# Patient Record
Sex: MALE | Race: WHITE | NOT HISPANIC OR LATINO | Employment: FULL TIME | URBAN - METROPOLITAN AREA
[De-identification: names, ages, dates, MRNs, and addresses within clinical notes are randomized per-mention and may not be internally consistent; named-entity substitution may affect disease eponyms.]

---

## 2017-01-13 DIAGNOSIS — G44.209 MIXED MIGRAINE AND MUSCLE CONTRACTION HEADACHE: ICD-10-CM

## 2017-01-13 DIAGNOSIS — G43.909 MIXED MIGRAINE AND MUSCLE CONTRACTION HEADACHE: ICD-10-CM

## 2017-01-13 DIAGNOSIS — F32.A DEPRESSION: ICD-10-CM

## 2017-01-13 RX ORDER — ALPRAZOLAM 1 MG/1
TABLET ORAL
Qty: 60 TABLET | Refills: 5 | OUTPATIENT
Start: 2017-01-13

## 2017-04-11 DIAGNOSIS — G43.909 MIXED MIGRAINE AND MUSCLE CONTRACTION HEADACHE: ICD-10-CM

## 2017-04-11 DIAGNOSIS — G44.209 MIXED MIGRAINE AND MUSCLE CONTRACTION HEADACHE: ICD-10-CM

## 2017-04-11 DIAGNOSIS — F32.A DEPRESSION: ICD-10-CM

## 2017-04-11 RX ORDER — ALPRAZOLAM 1 MG/1
TABLET ORAL
Qty: 60 TABLET | Refills: 5 | Status: SHIPPED | OUTPATIENT
Start: 2017-04-11 | End: 2017-10-16 | Stop reason: SDUPTHER

## 2017-05-05 ENCOUNTER — OFFICE VISIT (OUTPATIENT)
Dept: NEUROLOGY | Facility: CLINIC | Age: 57
End: 2017-05-05
Payer: COMMERCIAL

## 2017-05-05 VITALS
DIASTOLIC BLOOD PRESSURE: 98 MMHG | BODY MASS INDEX: 27.86 KG/M2 | HEART RATE: 72 BPM | SYSTOLIC BLOOD PRESSURE: 160 MMHG | WEIGHT: 167.19 LBS | HEIGHT: 65 IN

## 2017-05-05 DIAGNOSIS — G43.909 MIXED MIGRAINE AND MUSCLE CONTRACTION HEADACHE: ICD-10-CM

## 2017-05-05 DIAGNOSIS — G43.009 MIGRAINE WITHOUT AURA AND WITHOUT STATUS MIGRAINOSUS, NOT INTRACTABLE: Primary | ICD-10-CM

## 2017-05-05 DIAGNOSIS — G44.209 MIXED MIGRAINE AND MUSCLE CONTRACTION HEADACHE: ICD-10-CM

## 2017-05-05 PROCEDURE — 99999 PR PBB SHADOW E&M-EST. PATIENT-LVL III: CPT | Mod: PBBFAC,,, | Performed by: PSYCHIATRY & NEUROLOGY

## 2017-05-05 PROCEDURE — 3077F SYST BP >= 140 MM HG: CPT | Mod: S$GLB,,, | Performed by: PSYCHIATRY & NEUROLOGY

## 2017-05-05 PROCEDURE — 1160F RVW MEDS BY RX/DR IN RCRD: CPT | Mod: S$GLB,,, | Performed by: PSYCHIATRY & NEUROLOGY

## 2017-05-05 PROCEDURE — 3080F DIAST BP >= 90 MM HG: CPT | Mod: S$GLB,,, | Performed by: PSYCHIATRY & NEUROLOGY

## 2017-05-05 PROCEDURE — 99214 OFFICE O/P EST MOD 30 MIN: CPT | Mod: S$GLB,,, | Performed by: PSYCHIATRY & NEUROLOGY

## 2017-05-05 RX ORDER — TRAZODONE HYDROCHLORIDE 50 MG/1
TABLET ORAL
Status: ON HOLD | COMMUNITY
Start: 2017-02-11 | End: 2018-01-10 | Stop reason: HOSPADM

## 2017-05-05 RX ORDER — ARIPIPRAZOLE 2 MG/1
TABLET ORAL
COMMUNITY
Start: 2017-02-11 | End: 2017-12-01

## 2017-05-05 NOTE — PATIENT INSTRUCTIONS
no medication changes.  Continue Xanax on an as-needed basis.  Continue follow-up with his other physicians.

## 2017-05-05 NOTE — PROGRESS NOTES
Subjective:       Patient ID: Spenser Rabago is a 56 y.o. male.    Chief Complaint:  Headache      History of Present Illness  HPI   This is a 56-year-old male who returns in follow-up.  He was last seen by me 6 months ago.  He has a long history of headaches, mixed in type, coming on with fluctuating intensity. He has been doing well and only takes OTC meds for the headache with an occasional Xanax that he takes at bedtime. He continues working for an [employer] and [employer]. He has a history of hypertension. He continues follow-up with psychiatry and is also being followed by pain management.  Medications were reviewed.  He denies any new medical problems otherwise.       Review of Systems  Review of Systems   Constitutional: Negative.    HENT: Negative for hearing loss.    Eyes: Negative.  Negative for visual disturbance.   Respiratory: Negative.  Negative for shortness of breath.    Cardiovascular: Negative.  Negative for chest pain and palpitations.   Gastrointestinal: Negative.    Genitourinary: Negative.    Musculoskeletal: Negative.  Negative for back pain, gait problem and neck pain.   Skin: Negative.    Neurological: Positive for headaches. Negative for tremors, seizures, syncope, speech difficulty, weakness and numbness.   Psychiatric/Behavioral: Negative for behavioral problems, confusion and hallucinations. The patient is nervous/anxious.        Objective:      Neurologic Exam      Physical Exam   Constitutional: He appears well-developed and well-nourished.   HENT:   Head: Normocephalic and atraumatic.   Right Ear: Hearing normal.   Left Ear: Hearing normal.   Eyes:   Fundus examination shows sharp disc margins.   Neck: Normal range of motion. Neck supple. Carotid bruit is not present.   Neurological: He is alert. He has normal reflexes. He displays no atrophy. No cranial nerve deficit (Visual fields at bedside testing essentially normal.  No facial asymmetry noted with facial movements and  sensory exam being normal/symmetrical.  Corneals/gag reflexes normal.  Tongue & palate movements normal.  Hearing unimpaired.  Shoulder shrug was norm) or sensory deficit. He exhibits normal muscle tone. He displays a negative Romberg sign. Coordination and gait normal.   Mental status examination: Patient is fully oriented and able to give an adequate history.  Recall of recent and past information is good.  Immediate recall is normal.  Attention span and concentration was normal.  Judgment and insight is normal.  Language functions are intact with no evidence of aphasia or dysarthria.  Comprehension is unimpaired.  Affect is appropriate, mood was even.  No thought disorder is noted.   Vitals reviewed.        Assessment:        1. Migraine without aura and without status migrainosus, not intractable    2. Mixed migraine and muscle contraction headache             Plan:       no medication changes.  Continue Xanax on an as-needed basis.  Continue follow-up with his other physicians.  Follow-up in 6 months if stable.

## 2017-05-05 NOTE — MR AVS SNAPSHOT
[facility] - Neurology  [address] [place], [place] [address], [address]  [place] [address]  Phone: [phone]                  Spenser Rabago   2017 3:00 PM   Office Visit    Description:  Male : 1960   Provider:  Frank Lancaster MD   Department:  [facility] - Neurology           Reason for Visit     Headache           Diagnoses this Visit        Comments    Migraine without aura and without status migrainosus, not intractable    -  Primary     Mixed migraine and muscle contraction headache                To Do List           Goals (5 Years of Data)     None      Follow-Up and Disposition     Return in about 6 months (around 2017).      [facility]     [facility] On Call Nurse Care Line -  Assistance  Unless otherwise directed by your provider, please contact [facility], our nurse care line that is available for  assistance.     Registered nurses in the [facility] On Call Center provide: appointment scheduling, clinical advisement, health education, and other advisory services.  Call: [phone] (toll free)               Medications           Message regarding Medications     Verify the changes and/or additions to your medication regime listed below are the same as discussed with your clinician today.  If any of these changes or additions are incorrect, please notify your healthcare provider.        STOP taking these medications     amlodipine (NORVASC) 5 MG tablet Take 1 tablet (5 mg total) by mouth once daily.    lamotrigine (LAMICTAL) 25 MG tablet Take 75 mg by mouth once daily.    OXYCONTIN 20 mg 12 hr tablet Take 20 mg by mouth every 12 (twelve) hours.            Verify that the below list of medications is an accurate representation of the medications you are currently taking.  If none reported, the list may be blank. If incorrect, please contact your healthcare provider. Carry this list with you in case of emergency.           Current Medications     alprazolam (XANAX) 1 MG tablet TAKE  "1 TABLET BY MOUTH TWO TIMES A DAY    aripiprazole (ABILIFY) 2 MG Tab     baclofen (LIORESAL) 10 MG tablet Take 10 mg by mouth 3 (three) times daily.     buPROPion (WELLBUTRIN XL) 150 MG TB24 tablet     dextroamphetamine-amphetamine (ADDERALL) 20 mg tablet Take 1 tablet by mouth 3 (three) times daily.     lisinopril 10 MG tablet Take 10 mg by mouth once daily.    oxycodone-acetaminophen (PERCOCET)  mg per tablet     oxyMORphone (OPANA ER) 10 MG 12 hr tablet     tizanidine (ZANAFLEX) 4 MG tablet Take 4 mg by mouth every 8 (eight) hours.    topiramate (TOPAMAX) 100 MG tablet 100 mg every evening.     trazodone (DESYREL) 50 MG tablet     metoprolol succinate (TOPROL-XL) 50 MG 24 hr tablet Take 1 tablet (50 mg total) by mouth once daily.           Clinical Reference Information           Your Vitals Were     BP Pulse Height Weight BMI    160/98 72 5' 4.5" (1.638 m) 75.8 kg (167 lb 3.2 oz) 28.26 kg/m2      Blood Pressure          Most Recent Value    BP  (!)  160/98      Allergies as of 5/5/2017     Penicillins      Immunizations Administered on Date of Encounter - 5/5/2017     None      [facility] Sign-Up     Activating your [facility] account is as easy as 1-2-3!     1) Visit [email or web address], select Sign Up Now, enter this activation code and your date of birth, then select Next.  [email or web address]  Expires: 6/19/2017  3:03 PM      2) Create a username and password to use when you visit [facility] in the future and select a security question in case you lose your password and select Next.    3) Enter your e-mail address and click Sign Up!    Additional Information  If you have questions, please e-mail [email or web address] or call [phone] to talk to our [facility] staff. Remember, [facility] is NOT to be used for urgent needs. For medical emergencies, dial 911.         Instructions    no medication changes.  Continue Xanax on an as-needed basis.  Continue follow-up with his other physicians.         Language " Assistance Services     ATTENTION: Language assistance services are available, free of charge. Please call [phone].      ATENCIÓN: Si satishla anamika, tiene a ho disposición servicios gratuitos de asistencia lingüística. Llame al [phone].     CHÚ Ý: N?u b?n nói Ti?ng Vi?t, có các d?ch v? h? tr? ngôn ng? mi?n phí dành cho b?n. G?i s? [phone].         [facility] complies with applicable Federal civil rights laws and does not discriminate on the basis of race, color, national origin, age, disability, or sex.

## 2017-09-29 DIAGNOSIS — Z12.11 COLON CANCER SCREENING: ICD-10-CM

## 2017-10-16 DIAGNOSIS — G44.209 MIXED MIGRAINE AND MUSCLE CONTRACTION HEADACHE: ICD-10-CM

## 2017-10-16 DIAGNOSIS — G43.909 MIXED MIGRAINE AND MUSCLE CONTRACTION HEADACHE: ICD-10-CM

## 2017-10-16 DIAGNOSIS — F32.A DEPRESSION: ICD-10-CM

## 2017-10-16 RX ORDER — ALPRAZOLAM 1 MG/1
TABLET ORAL
Qty: 60 TABLET | Refills: 5 | Status: SHIPPED | OUTPATIENT
Start: 2017-10-16 | End: 2017-12-22 | Stop reason: SDUPTHER

## 2017-12-01 ENCOUNTER — OFFICE VISIT (OUTPATIENT)
Dept: INTERNAL MEDICINE | Facility: CLINIC | Age: 57
End: 2017-12-01
Payer: COMMERCIAL

## 2017-12-01 VITALS
HEIGHT: 65 IN | BODY MASS INDEX: 29 KG/M2 | DIASTOLIC BLOOD PRESSURE: 72 MMHG | RESPIRATION RATE: 16 BRPM | HEART RATE: 88 BPM | SYSTOLIC BLOOD PRESSURE: 108 MMHG | WEIGHT: 174.06 LBS | TEMPERATURE: 100 F

## 2017-12-01 DIAGNOSIS — I10 ESSENTIAL HYPERTENSION: ICD-10-CM

## 2017-12-01 DIAGNOSIS — Z00.00 ROUTINE GENERAL MEDICAL EXAMINATION AT A HEALTH CARE FACILITY: ICD-10-CM

## 2017-12-01 DIAGNOSIS — G81.91 RIGHT HEMIPARESIS: Primary | ICD-10-CM

## 2017-12-01 DIAGNOSIS — G43.009 MIGRAINE WITHOUT AURA AND WITHOUT STATUS MIGRAINOSUS, NOT INTRACTABLE: ICD-10-CM

## 2017-12-01 PROCEDURE — 90686 IIV4 VACC NO PRSV 0.5 ML IM: CPT | Mod: S$GLB,,, | Performed by: INTERNAL MEDICINE

## 2017-12-01 PROCEDURE — 99999 PR PBB SHADOW E&M-EST. PATIENT-LVL IV: CPT | Mod: PBBFAC,,, | Performed by: INTERNAL MEDICINE

## 2017-12-01 PROCEDURE — 99214 OFFICE O/P EST MOD 30 MIN: CPT | Mod: 25,S$GLB,, | Performed by: INTERNAL MEDICINE

## 2017-12-01 PROCEDURE — 90715 TDAP VACCINE 7 YRS/> IM: CPT | Mod: S$GLB,,, | Performed by: INTERNAL MEDICINE

## 2017-12-01 PROCEDURE — 90471 IMMUNIZATION ADMIN: CPT | Mod: S$GLB,,, | Performed by: INTERNAL MEDICINE

## 2017-12-01 PROCEDURE — 90472 IMMUNIZATION ADMIN EACH ADD: CPT | Mod: S$GLB,,, | Performed by: INTERNAL MEDICINE

## 2017-12-01 NOTE — PROGRESS NOTES
Subjective:       Patient ID: Spenser Rabago is a 57 y.o. male.    Chief Complaint: Memory Loss (symptoms x 2 days); Fatigue; Numbness (fingertips); Blurred Vision; and Joint Pain    HPI  Pt c/o confusion, memory problems, blurry vision, bad balance x 2 days.  Pt was heard to have slurred speech and he's having trouble holding things in his R hand.  HA at baseline.  C/O some knee pain.  Also with a cough x 1 month.  Review of Systems   All other systems reviewed and are negative.      Objective:      Physical Exam   Constitutional: He appears well-developed. No distress.   HENT:   Head: Normocephalic.   Mouth/Throat: Oropharynx is clear and moist.   Eyes: EOM are normal. No scleral icterus.   Neck: Normal range of motion. No tracheal deviation present.   Cardiovascular: Normal rate, regular rhythm, normal heart sounds and intact distal pulses.    Pulmonary/Chest: Effort normal and breath sounds normal. No respiratory distress.   Abdominal: Soft. Bowel sounds are normal. He exhibits no distension. There is no tenderness.   Musculoskeletal: Normal range of motion. He exhibits no edema.   Neurological: He is alert. He exhibits abnormal muscle tone (mild R hemiparesis).   - Rhomberg   Skin: Skin is warm and dry. No rash noted. He is not diaphoretic. No erythema.   Psychiatric: He has a normal mood and affect. His behavior is normal.   Vitals reviewed.      Assessment:       1. Right hemiparesis    2. Essential hypertension    3. Migraine without aura and without status migrainosus, not intractable    4. Routine general medical examination at a health care facility        Plan:       Spenser was seen today for memory loss, fatigue, numbness, blurred vision and joint pain.    Diagnoses and all orders for this visit:    Right hemiparesis  -     MRI Brain Without Contrast; Future   ASA 81 mg qd    Essential hypertension   Well-cont    Migraine without aura and without status migrainosus, not intractable    Routine  general medical examination at a health care facility      Return if symptoms worsen or fail to improve.

## 2017-12-06 ENCOUNTER — HOSPITAL ENCOUNTER (OUTPATIENT)
Dept: RADIOLOGY | Facility: HOSPITAL | Age: 57
Discharge: HOME | End: 2017-12-06
Attending: INTERNAL MEDICINE
Payer: COMMERCIAL

## 2017-12-06 DIAGNOSIS — G81.91 RIGHT HEMIPARESIS: ICD-10-CM

## 2017-12-06 PROCEDURE — 70551 MRI BRAIN STEM W/O DYE: CPT | Mod: TC,PO

## 2017-12-12 ENCOUNTER — TELEPHONE (OUTPATIENT)
Dept: INTERNAL MEDICINE | Facility: CLINIC | Age: 57
End: 2017-12-12

## 2017-12-12 NOTE — TELEPHONE ENCOUNTER
----- Message from Bianka Tabor sent at 12/12/2017 10:58 AM CST -----  Contact: Self. [phone]  Patient would like to speak with you about getting test results. Please advise.

## 2017-12-22 ENCOUNTER — OFFICE VISIT (OUTPATIENT)
Dept: NEUROLOGY | Facility: CLINIC | Age: 57
End: 2017-12-22
Payer: COMMERCIAL

## 2017-12-22 VITALS
HEIGHT: 65 IN | HEART RATE: 78 BPM | SYSTOLIC BLOOD PRESSURE: 131 MMHG | BODY MASS INDEX: 28.06 KG/M2 | WEIGHT: 168.38 LBS | DIASTOLIC BLOOD PRESSURE: 75 MMHG

## 2017-12-22 DIAGNOSIS — I10 ESSENTIAL HYPERTENSION: ICD-10-CM

## 2017-12-22 DIAGNOSIS — F32.A DEPRESSION, UNSPECIFIED DEPRESSION TYPE: ICD-10-CM

## 2017-12-22 DIAGNOSIS — G44.209 MIXED MIGRAINE AND MUSCLE CONTRACTION HEADACHE: Primary | ICD-10-CM

## 2017-12-22 DIAGNOSIS — G43.909 MIXED MIGRAINE AND MUSCLE CONTRACTION HEADACHE: Primary | ICD-10-CM

## 2017-12-22 PROCEDURE — 99214 OFFICE O/P EST MOD 30 MIN: CPT | Mod: S$GLB,,, | Performed by: PSYCHIATRY & NEUROLOGY

## 2017-12-22 PROCEDURE — 99999 PR PBB SHADOW E&M-EST. PATIENT-LVL III: CPT | Mod: PBBFAC,,, | Performed by: PSYCHIATRY & NEUROLOGY

## 2017-12-22 RX ORDER — OXYCODONE AND ACETAMINOPHEN 10; 325 MG/1; MG/1
TABLET ORAL
Status: ON HOLD | COMMUNITY
Start: 2017-12-19 | End: 2018-01-10 | Stop reason: HOSPADM

## 2017-12-22 RX ORDER — ALPRAZOLAM 1 MG/1
1 TABLET ORAL 2 TIMES DAILY
Qty: 60 TABLET | Refills: 5 | Status: ON HOLD | OUTPATIENT
Start: 2017-12-22 | End: 2018-01-10 | Stop reason: HOSPADM

## 2017-12-22 NOTE — PROGRESS NOTES
Subjective:       Patient ID: Spenser Rabago is a 57 y.o. male.    Chief Complaint:  Headache      History of Present Illness  HPI   This is a 57-year-old male who returns in follow-up.  He was last seen by me 6 months ago.  He has a long history of headaches, mixed in type, coming on with fluctuating intensity. He has been doing well and only takes OTC meds for the headache with an occasional Xanax that he takes at bedtime. He continues follow-up with psychiatry and is also being followed by pain management.  Medications were reviewed.  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  A couple of weeks ago he had an episode of transient confusion and weakness and was seen by Dr. Apple and had an MRI scan of the brain done that showed no acute intracranial process.  There was presence of symmetric T2 hyperintense signal within the globus pallidus bilaterally, which can be associated with toxic poisoning.  The patient denies any changes in his medications and denies any history of possible carbon monoxide exposure.  His symptoms are entirely resolved and is back to his usual baseline.       Review of Systems  Review of Systems   Constitutional: Negative.    HENT: Negative for hearing loss.    Eyes: Negative.  Negative for visual disturbance.   Respiratory: Negative.  Negative for shortness of breath.    Cardiovascular: Negative.  Negative for chest pain and palpitations.   Gastrointestinal: Negative.    Genitourinary: Negative.    Musculoskeletal: Negative.  Negative for back pain, gait problem and neck pain.   Skin: Negative.    Neurological: Positive for headaches. Negative for tremors, seizures, syncope, speech difficulty, weakness and numbness.   Psychiatric/Behavioral: Negative for behavioral problems, confusion and hallucinations. The patient is nervous/anxious.        Objective:      Neurologic Exam      Physical Exam   Constitutional: He appears well-developed and well-nourished.   HENT:   Head:  Normocephalic and atraumatic.   Right Ear: Hearing normal.   Left Ear: Hearing normal.   Eyes:   Fundus examination shows sharp disc margins.   Neck: Normal range of motion. Neck supple. Carotid bruit is not present.   Neurological: He is alert. He has normal reflexes. He displays no atrophy. No cranial nerve deficit (Visual fields at bedside testing essentially normal.  No facial asymmetry noted with facial movements and sensory exam being normal/symmetrical.  Corneals/gag reflexes normal.  Tongue & palate movements normal.  Hearing unimpaired.  Shoulder shrug was norm) or sensory deficit. He exhibits normal muscle tone. He displays a negative Romberg sign. Coordination and gait normal.   Mental status examination: Patient is fully oriented and able to give an adequate history.  Recall of recent and past information is good.  Immediate recall is normal.  Attention span and concentration was normal.  Judgment and insight is normal.  Language functions are intact with no evidence of aphasia or dysarthria.  Comprehension is unimpaired.  Affect is appropriate, mood was even.  No thought disorder is noted.   Vitals reviewed.        Assessment:        1. Mixed migraine and muscle contraction headache    2. Depression, unspecified depression type    3. Essential hypertension             Plan:       no medication changes.  Continue Xanax on an as-needed basis.  Continue follow-up with his other physicians and by psychiatry.  Advised to discuss the episode with his psychiatrist and given them a copy of the written report of the MRI scan.  Follow-up in 6 months if stable.

## 2018-01-05 ENCOUNTER — HOSPITAL ENCOUNTER (INPATIENT)
Facility: HOSPITAL | Age: 58
LOS: 13 days | Discharge: NURSING, REHAB OR LONG-TERM CARE | DRG: 918 | End: 2018-01-18
Attending: EMERGENCY MEDICINE | Admitting: HOSPITALIST
Payer: COMMERCIAL

## 2018-01-05 DIAGNOSIS — G44.209 MIXED MIGRAINE AND MUSCLE CONTRACTION HEADACHE: ICD-10-CM

## 2018-01-05 DIAGNOSIS — R78.81 BACTEREMIA DUE TO STAPHYLOCOCCUS AUREUS: ICD-10-CM

## 2018-01-05 DIAGNOSIS — F32.A DEPRESSION, UNSPECIFIED DEPRESSION TYPE: ICD-10-CM

## 2018-01-05 DIAGNOSIS — I38 ENDOCARDITIS: ICD-10-CM

## 2018-01-05 DIAGNOSIS — R01.1 MURMUR: ICD-10-CM

## 2018-01-05 DIAGNOSIS — M79.671 RIGHT FOOT PAIN: ICD-10-CM

## 2018-01-05 DIAGNOSIS — M62.82 NON-TRAUMATIC RHABDOMYOLYSIS: ICD-10-CM

## 2018-01-05 DIAGNOSIS — E87.1 HYPONATREMIA: ICD-10-CM

## 2018-01-05 DIAGNOSIS — B95.61 BACTEREMIA DUE TO STAPHYLOCOCCUS AUREUS: ICD-10-CM

## 2018-01-05 DIAGNOSIS — E86.0 DEHYDRATION: ICD-10-CM

## 2018-01-05 DIAGNOSIS — R29.898 MUSCULAR DECONDITIONING: ICD-10-CM

## 2018-01-05 DIAGNOSIS — R29.6 RECURRENT FALLS: ICD-10-CM

## 2018-01-05 DIAGNOSIS — R78.81 BACTEREMIA: ICD-10-CM
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
## 2018-01-05 DIAGNOSIS — I10 ESSENTIAL HYPERTENSION: Chronic | ICD-10-CM

## 2018-01-05 DIAGNOSIS — R74.8 ELEVATED CK: ICD-10-CM

## 2018-01-05 DIAGNOSIS — G43.909 MIXED MIGRAINE AND MUSCLE CONTRACTION HEADACHE: ICD-10-CM

## 2018-01-05 PROBLEM — D72.829 LEUKOCYTOSIS: Status: ACTIVE | Noted: 2018-01-05

## 2018-01-05 PROBLEM — E87.20 METABOLIC ACIDOSIS: Status: ACTIVE | Noted: 2018-01-05

## 2018-01-05 PROBLEM — R79.89 AZOTEMIA: Status: ACTIVE | Noted: 2018-01-05

## 2018-01-05 LAB
ALBUMIN SERPL BCP-MCNC: 3.1 G/DL
ALP SERPL-CCNC: 69 U/L
ALT SERPL W/O P-5'-P-CCNC: 87 U/L
AMMONIA PLAS-SCNC: 28 UMOL/L
AMPHET+METHAMPHET UR QL: NEGATIVE
ANION GAP SERPL CALC-SCNC: 17 MMOL/L
ANION GAP SERPL CALC-SCNC: 20 MMOL/L
AST SERPL-CCNC: 82 U/L
BACTERIA #/AREA URNS HPF: ABNORMAL /HPF
BARBITURATES UR QL SCN>200 NG/ML: NEGATIVE
BASOPHILS # BLD AUTO: 0.01 K/UL
BASOPHILS NFR BLD: 0.1 %
BENZODIAZ UR QL SCN>200 NG/ML: NEGATIVE
BILIRUB SERPL-MCNC: 1.1 MG/DL
BILIRUB UR QL STRIP: ABNORMAL
BNP SERPL-MCNC: 219 PG/ML
BUN SERPL-MCNC: 36 MG/DL
BUN SERPL-MCNC: 47 MG/DL
BZE UR QL SCN: NEGATIVE
CALCIUM SERPL-MCNC: 8.3 MG/DL
CALCIUM SERPL-MCNC: 8.9 MG/DL
CANNABINOIDS UR QL SCN: NEGATIVE
CHLORIDE SERPL-SCNC: 89 MMOL/L
CHLORIDE SERPL-SCNC: 96 MMOL/L
CHOLEST SERPL-MCNC: 138 MG/DL
CHOLEST/HDLC SERPL: 3.4 {RATIO}
CK SERPL-CCNC: 1759 U/L
CLARITY UR: CLEAR
CO2 SERPL-SCNC: 17 MMOL/L
CO2 SERPL-SCNC: 18 MMOL/L
COLOR UR: ABNORMAL
CREAT SERPL-MCNC: 0.8 MG/DL
CREAT SERPL-MCNC: 1.1 MG/DL
CREAT UR-MCNC: 108.7 MG/DL
DIASTOLIC DYSFUNCTION: YES
DIFFERENTIAL METHOD: ABNORMAL
EOSINOPHIL # BLD AUTO: 0 K/UL
EOSINOPHIL NFR BLD: 0.1 %
ERYTHROCYTE [DISTWIDTH] IN BLOOD BY AUTOMATED COUNT: 12.4 %
EST. GFR  (AFRICAN AMERICAN): >60 ML/MIN/1.73 M^2
EST. GFR  (AFRICAN AMERICAN): >60 ML/MIN/1.73 M^2
EST. GFR  (NON AFRICAN AMERICAN): >60 ML/MIN/1.73 M^2
EST. GFR  (NON AFRICAN AMERICAN): >60 ML/MIN/1.73 M^2
ESTIMATED AVG GLUCOSE: 88 MG/DL
ETHANOL SERPL-MCNC: <10 MG/DL
FLUAV AG SPEC QL IA: NEGATIVE
FLUBV AG SPEC QL IA: NEGATIVE
GLOBAL PERICARDIAL EFFUSION: ABNORMAL
GLUCOSE SERPL-MCNC: 81 MG/DL
GLUCOSE SERPL-MCNC: 83 MG/DL
GLUCOSE UR QL STRIP: NEGATIVE
HBA1C MFR BLD HPLC: 4.7 %
HCT VFR BLD AUTO: 47 %
HDLC SERPL-MCNC: 41 MG/DL
HDLC SERPL: 29.7 %
HGB BLD-MCNC: 17 G/DL
HGB UR QL STRIP: ABNORMAL
HYALINE CASTS #/AREA URNS LPF: 5 /LPF
INR PPP: 1.2
KETONES UR QL STRIP: ABNORMAL
LACTATE SERPL-SCNC: 2 MMOL/L
LDLC SERPL CALC-MCNC: 76 MG/DL
LEUKOCYTE ESTERASE UR QL STRIP: NEGATIVE
LYMPHOCYTES # BLD AUTO: 1.4 K/UL
LYMPHOCYTES NFR BLD: 7.4 %
MCH RBC QN AUTO: 29.9 PG
MCHC RBC AUTO-ENTMCNC: 36.2 G/DL
MCV RBC AUTO: 83 FL
METHADONE UR QL SCN>300 NG/ML: NEGATIVE
MICROSCOPIC COMMENT: ABNORMAL
MITRAL VALVE MOBILITY: NORMAL
MONOCYTES # BLD AUTO: 1.4 K/UL
MONOCYTES NFR BLD: 7.5 %
NEUTROPHILS # BLD AUTO: 15.5 K/UL
NEUTROPHILS NFR BLD: 84.5 %
NITRITE UR QL STRIP: NEGATIVE
NONHDLC SERPL-MCNC: 97 MG/DL
OPIATES UR QL SCN: NEGATIVE
PCP UR QL SCN>25 NG/ML: NEGATIVE
PH UR STRIP: 5 [PH] (ref 5–8)
PLATELET # BLD AUTO: 345 K/UL
PMV BLD AUTO: 10.6 FL
POTASSIUM SERPL-SCNC: 4.2 MMOL/L
POTASSIUM SERPL-SCNC: 5 MMOL/L
PROT SERPL-MCNC: 6.9 G/DL
PROT UR QL STRIP: ABNORMAL
PROTHROMBIN TIME: 12.7 SEC
RBC # BLD AUTO: 5.68 M/UL
RBC #/AREA URNS HPF: 1 /HPF (ref 0–4)
RETIRED EF AND QEF - SEE NOTES: 65 (ref 55–65)
SODIUM SERPL-SCNC: 127 MMOL/L
SODIUM SERPL-SCNC: 130 MMOL/L
SP GR UR STRIP: 1.02 (ref 1–1.03)
SPECIMEN SOURCE: NORMAL
SQUAMOUS #/AREA URNS HPF: 0 /HPF
TOXICOLOGY INFORMATION: NORMAL
TRIGL SERPL-MCNC: 105 MG/DL
TROPONIN I SERPL DL<=0.01 NG/ML-MCNC: 0.07 NG/ML
TROPONIN I SERPL DL<=0.01 NG/ML-MCNC: 0.12 NG/ML
TSH SERPL DL<=0.005 MIU/L-ACNC: 1.84 UIU/ML
URN SPEC COLLECT METH UR: ABNORMAL
UROBILINOGEN UR STRIP-ACNC: 1 EU/DL
VIT B12 SERPL-MCNC: 369 PG/ML
WBC # BLD AUTO: 18.25 K/UL
WBC #/AREA URNS HPF: 0 /HPF (ref 0–5)

## 2018-01-05 PROCEDURE — 92610 EVALUATE SWALLOWING FUNCTION: CPT

## 2018-01-05 PROCEDURE — 85610 PROTHROMBIN TIME: CPT

## 2018-01-05 PROCEDURE — 11000001 HC ACUTE MED/SURG PRIVATE ROOM

## 2018-01-05 PROCEDURE — 93005 ELECTROCARDIOGRAM TRACING: CPT

## 2018-01-05 PROCEDURE — 97162 PT EVAL MOD COMPLEX 30 MIN: CPT

## 2018-01-05 PROCEDURE — A4216 STERILE WATER/SALINE, 10 ML: HCPCS | Performed by: HOSPITALIST

## 2018-01-05 PROCEDURE — 25000003 PHARM REV CODE 250: Performed by: HOSPITALIST

## 2018-01-05 PROCEDURE — 97165 OT EVAL LOW COMPLEX 30 MIN: CPT

## 2018-01-05 PROCEDURE — 82140 ASSAY OF AMMONIA: CPT

## 2018-01-05 PROCEDURE — 87040 BLOOD CULTURE FOR BACTERIA: CPT | Mod: 59

## 2018-01-05 PROCEDURE — 25000003 PHARM REV CODE 250: Performed by: EMERGENCY MEDICINE

## 2018-01-05 PROCEDURE — 84443 ASSAY THYROID STIM HORMONE: CPT

## 2018-01-05 PROCEDURE — G8998 SWALLOW D/C STATUS: HCPCS | Mod: CH

## 2018-01-05 PROCEDURE — 82550 ASSAY OF CK (CPK): CPT

## 2018-01-05 PROCEDURE — 96374 THER/PROPH/DIAG INJ IV PUSH: CPT

## 2018-01-05 PROCEDURE — 63600175 PHARM REV CODE 636 W HCPCS: Performed by: HOSPITALIST

## 2018-01-05 PROCEDURE — A9585 GADOBUTROL INJECTION: HCPCS | Performed by: EMERGENCY MEDICINE

## 2018-01-05 PROCEDURE — 80048 BASIC METABOLIC PNL TOTAL CA: CPT

## 2018-01-05 PROCEDURE — 85025 COMPLETE CBC W/AUTO DIFF WBC: CPT

## 2018-01-05 PROCEDURE — 83605 ASSAY OF LACTIC ACID: CPT

## 2018-01-05 PROCEDURE — 87400 INFLUENZA A/B EACH AG IA: CPT

## 2018-01-05 PROCEDURE — 93010 ELECTROCARDIOGRAM REPORT: CPT | Mod: ,,, | Performed by: INTERNAL MEDICINE

## 2018-01-05 PROCEDURE — 97530 THERAPEUTIC ACTIVITIES: CPT

## 2018-01-05 PROCEDURE — 80061 LIPID PANEL: CPT

## 2018-01-05 PROCEDURE — 86592 SYPHILIS TEST NON-TREP QUAL: CPT

## 2018-01-05 PROCEDURE — 84484 ASSAY OF TROPONIN QUANT: CPT

## 2018-01-05 PROCEDURE — 83036 HEMOGLOBIN GLYCOSYLATED A1C: CPT

## 2018-01-05 PROCEDURE — 36600 WITHDRAWAL OF ARTERIAL BLOOD: CPT

## 2018-01-05 PROCEDURE — 84484 ASSAY OF TROPONIN QUANT: CPT | Mod: 91

## 2018-01-05 PROCEDURE — 25500020 PHARM REV CODE 255: Performed by: EMERGENCY MEDICINE

## 2018-01-05 PROCEDURE — 96361 HYDRATE IV INFUSION ADD-ON: CPT

## 2018-01-05 PROCEDURE — 99285 EMERGENCY DEPT VISIT HI MDM: CPT | Mod: 25

## 2018-01-05 PROCEDURE — 82375 ASSAY CARBOXYHB QUANT: CPT

## 2018-01-05 PROCEDURE — 80053 COMPREHEN METABOLIC PANEL: CPT

## 2018-01-05 PROCEDURE — 82607 VITAMIN B-12: CPT

## 2018-01-05 PROCEDURE — 93306 TTE W/DOPPLER COMPLETE: CPT

## 2018-01-05 PROCEDURE — 80320 DRUG SCREEN QUANTALCOHOLS: CPT

## 2018-01-05 PROCEDURE — 81000 URINALYSIS NONAUTO W/SCOPE: CPT

## 2018-01-05 PROCEDURE — G8996 SWALLOW CURRENT STATUS: HCPCS | Mod: CH

## 2018-01-05 PROCEDURE — 97802 MEDICAL NUTRITION INDIV IN: CPT

## 2018-01-05 PROCEDURE — G8997 SWALLOW GOAL STATUS: HCPCS | Mod: CH

## 2018-01-05 PROCEDURE — 93306 TTE W/DOPPLER COMPLETE: CPT | Mod: 26,,, | Performed by: INTERNAL MEDICINE

## 2018-01-05 PROCEDURE — 83880 ASSAY OF NATRIURETIC PEPTIDE: CPT

## 2018-01-05 PROCEDURE — 80307 DRUG TEST PRSMV CHEM ANLYZR: CPT

## 2018-01-05 RX ORDER — SODIUM CHLORIDE 9 MG/ML
INJECTION, SOLUTION INTRAVENOUS CONTINUOUS
Status: DISCONTINUED | OUTPATIENT
Start: 2018-01-05 | End: 2018-01-06

## 2018-01-05 RX ORDER — ASPIRIN 325 MG
325 TABLET, DELAYED RELEASE (ENTERIC COATED) ORAL DAILY
Status: DISCONTINUED | OUTPATIENT
Start: 2018-01-05 | End: 2018-01-08

## 2018-01-05 RX ORDER — SODIUM CHLORIDE 9 MG/ML
1000 INJECTION, SOLUTION INTRAVENOUS
Status: COMPLETED | OUTPATIENT
Start: 2018-01-05 | End: 2018-01-05

## 2018-01-05 RX ORDER — ATORVASTATIN CALCIUM 40 MG/1
40 TABLET, FILM COATED ORAL DAILY
Status: DISCONTINUED | OUTPATIENT
Start: 2018-01-05 | End: 2018-01-08

## 2018-01-05 RX ORDER — SODIUM CHLORIDE 0.9 % (FLUSH) 0.9 %
3 SYRINGE (ML) INJECTION EVERY 8 HOURS
Status: DISCONTINUED | OUTPATIENT
Start: 2018-01-05 | End: 2018-01-18 | Stop reason: HOSPADM

## 2018-01-05 RX ORDER — ONDANSETRON 2 MG/ML
4 INJECTION INTRAMUSCULAR; INTRAVENOUS EVERY 8 HOURS PRN
Status: DISCONTINUED | OUTPATIENT
Start: 2018-01-05 | End: 2018-01-09

## 2018-01-05 RX ORDER — LABETALOL HYDROCHLORIDE 5 MG/ML
10 INJECTION, SOLUTION INTRAVENOUS
Status: DISCONTINUED | OUTPATIENT
Start: 2018-01-05 | End: 2018-01-09

## 2018-01-05 RX ORDER — ENOXAPARIN SODIUM 100 MG/ML
40 INJECTION SUBCUTANEOUS EVERY 24 HOURS
Status: DISCONTINUED | OUTPATIENT
Start: 2018-01-05 | End: 2018-01-18 | Stop reason: HOSPADM

## 2018-01-05 RX ORDER — GADOBUTROL 604.72 MG/ML
10 INJECTION INTRAVENOUS
Status: COMPLETED | OUTPATIENT
Start: 2018-01-05 | End: 2018-01-05

## 2018-01-05 RX ADMIN — ATORVASTATIN CALCIUM 40 MG: 40 TABLET, FILM COATED ORAL at 01:01

## 2018-01-05 RX ADMIN — SODIUM CHLORIDE: 0.9 INJECTION, SOLUTION INTRAVENOUS at 01:01

## 2018-01-05 RX ADMIN — SODIUM CHLORIDE 1000 ML: 0.9 INJECTION, SOLUTION INTRAVENOUS at 06:01

## 2018-01-05 RX ADMIN — REGULAR STRENGTH 325 MG: 325 TABLET ORAL at 01:01

## 2018-01-05 RX ADMIN — GADOBUTROL 10 ML: 604.72 INJECTION INTRAVENOUS at 12:01

## 2018-01-05 RX ADMIN — SODIUM CHLORIDE, PRESERVATIVE FREE 3 ML: 5 INJECTION INTRAVENOUS at 09:01

## 2018-01-05 RX ADMIN — SODIUM CHLORIDE, PRESERVATIVE FREE 3 ML: 5 INJECTION INTRAVENOUS at 02:01

## 2018-01-05 RX ADMIN — ENOXAPARIN SODIUM 40 MG: 100 INJECTION SUBCUTANEOUS at 06:01

## 2018-01-05 RX ADMIN — SODIUM CHLORIDE: 0.9 INJECTION, SOLUTION INTRAVENOUS at 09:01

## 2018-01-05 RX ADMIN — SODIUM CHLORIDE 1000 ML: 0.9 INJECTION, SOLUTION INTRAVENOUS at 05:01

## 2018-01-05 NOTE — ED NOTES
"Pt presents [facility] from [facility]. EMS reports finding pt on floor in home naked and scooting on floor to get between rooms. EMS reports that pt fell at approximately 2030 on 1/4/18. Upon arrival to ED pt states that he was lonely at home and had been incontinent. Pt states suffered multiple falls over the past week. Pt states he "broke" right foot from falling Tuesday and has been falling since Tuesday due to foot injury. Pt denies being seen following fall Tuesday. Pt has multiple bruises and abrasions in various stages of healing over body, see physical diagram. Pt responds to questions with appropriate answers, delayed responses to questions.   "

## 2018-01-05 NOTE — PLAN OF CARE
Problem: Patient Care Overview  Goal: Plan of Care Review  Outcome: Ongoing (interventions implemented as appropriate)  Recommendation/Intervention:   1. Encourage good intake at meals.   2. If intake <50%, add Boost Plus bid.    Goals:   Pt will meet at least 85% EEN  Nutrition Goal Status: new

## 2018-01-05 NOTE — PLAN OF CARE
Problem: SLP Goal  Goal: SLP Goal  Outcome: Outcome(s) achieved Date Met: 01/05/18  Clinical Swallow eval completed in ED, pt alert, cooperative and able to follow commands. Pt safe for consumption of regular diet and thin liquids. RN and MD notified of SLP diet recommendations.

## 2018-01-05 NOTE — CONSULTS
"  [facility]  Adult Nutrition  Consult Note    SUMMARY     Recommendations    Recommendation/Intervention:   1. Encourage good intake at meals.   2. If intake <50%, add Boost Plus bid.    Goals:   Pt will meet at least 85% EEN  Nutrition Goal Status: new     Continuum of Care Plan  Referral to Outpatient Services:  (pt to d/c on Regular diet)    Reason for Assessment  Reason for Assessment: physician consult (assess dietary needs/weight loss)  Diagnosis:  (non traumatic rhabdomyolysis)  Relevent Medical History: depression, headache, ADD, HTN      General Information Comments: Pt started on Regular diet per ST recs. Pt remains in ER.    Nutrition Prescription Ordered  Current Diet Order: Regular    Evaluation of Received Nutrients/Fluid Intake  % Intake of Estimated Energy Needs: Other: diet just started  % Meal Intake: Other: diet just started     Nutrition/Diet History  Food Preferences: unable to assess    Labs/Tests/Procedures/Meds  Pertinent Labs Reviewed: reviewed  Pertinent Labs Comments: Na 127L, BUN 47H, Alb 3.1L  Pertinent Medications Reviewed: reviewed     Physical Findings  Overall Physical Appearance: overweight  Tubes:  (none)  Oral/Mouth Cavity:  (unable to assess)  Skin:  (intact)    Anthropometrics  Temp: 98.1 °F (36.7 °C)  Height: 5' 4" (162.6 cm)  Weight Method: Stated  Weight: 74.8 kg (164 lb 14.5 oz)  Ideal Body Weight (IBW), Male: 130 lb  % Ideal Body Weight, Male (lb): 126.85 lb  BMI (Calculated): 28.4  BMI Grade: 25 - 29.9 - overweight     Estimated/Assessed Needs  Weight Used For Calorie Calculations: 74.8 kg (164 lb 14.5 oz)   Energy Calorie Requirements (kcal): 2244  Energy Need Method: Kcal/kg (30 kcal/kg)  RMR ([place]-St. Jeor Equation): 1479  Weight Used For Protein Calculations: 74.8 kg (164 lb 14.5 oz)  Protein Requirements: 75g (1.0g/kg)  Fluid Need Method: RDA Method  RDA Method (mL): 2244     Assessment and Plan  No nutrition dx at this time    Monitor and " Evaluation  Food and Nutrient Intake: food and beverage intake  Food and Nutrient Adminstration: diet order  Physical Activity and Function: nutrition-related ADLs and IADLs  Anthropometric Measurements: weight  Biochemical Data, Medical Tests and Procedures: electrolyte and renal panel  Nutrition-Focused Physical Findings: overall appearance    Nutrition Risk  Level of Risk:  (2xweekly)    Nutrition Follow-Up  RD Follow-up?: Yes

## 2018-01-05 NOTE — SUBJECTIVE & OBJECTIVE
Past Medical History:   Diagnosis Date    ADD (attention deficit disorder)     Depression     Headache(784.0)     Hypertension     Migraines        History reviewed. No pertinent surgical history.    Review of patient's allergies indicates:   Allergen Reactions    Penicillins Hives       No current facility-administered medications on file prior to encounter.      Current Outpatient Prescriptions on File Prior to Encounter   Medication Sig    ALPRAZolam (XANAX) 1 MG tablet Take 1 tablet (1 mg total) by mouth 2 (two) times daily.    baclofen (LIORESAL) 10 MG tablet Take 10 mg by mouth 3 (three) times daily.     buPROPion (WELLBUTRIN XL) 150 MG TB24 tablet     dextroamphetamine-amphetamine (ADDERALL) 20 mg tablet Take 1 tablet by mouth 3 (three) times daily.     lisinopril 10 MG tablet Take 10 mg by mouth once daily.    metoprolol succinate (TOPROL-XL) 50 MG 24 hr tablet Take 1 tablet (50 mg total) by mouth once daily.    oxyCODONE-acetaminophen (PERCOCET)  mg per tablet     oxyMORphone (OPANA ER) 10 MG 12 hr tablet     tizanidine (ZANAFLEX) 4 MG tablet Take 4 mg by mouth every 8 (eight) hours.    topiramate (TOPAMAX) 100 MG tablet 100 mg every evening.     trazodone (DESYREL) 50 MG tablet      Family History     Problem Relation (Age of Onset)    Heart disease Mother, Father    Hypertension Mother, Father        Social History Main Topics    Smoking status: Never Smoker    Smokeless tobacco: Never Used    Alcohol use 0.6 oz/week     1 Shots of liquor per week    Drug use: No    Sexual activity: Not on file     Review of Systems   Constitutional: Positive for appetite change and fatigue. Negative for chills and fever.   HENT: Negative for congestion, hearing loss, nosebleeds, sore throat and tinnitus.    Eyes: Negative for discharge, itching and visual disturbance.   Respiratory: Negative for cough, chest tightness and shortness of breath.    Cardiovascular: Negative for chest pain and  palpitations.   Gastrointestinal: Negative for abdominal pain, blood in stool, diarrhea, nausea and vomiting.   Endocrine: Negative for cold intolerance and polydipsia.   Genitourinary: Negative for difficulty urinating, dysuria and hematuria.   Musculoskeletal: Positive for myalgias. Negative for arthralgias and neck stiffness.   Skin: Negative for rash and wound.   Allergic/Immunologic: Negative for environmental allergies and immunocompromised state.   Neurological: Positive for weakness. Negative for dizziness, tremors and headaches.   Hematological: Does not bruise/bleed easily.   Psychiatric/Behavioral: Negative for agitation and confusion. The patient is not nervous/anxious.      Objective:     Vital Signs (Most Recent):  Temp: 98.1 °F (36.7 °C) (01/05/18 1337)  Pulse: 98 (01/05/18 1554)  Resp: 20 (01/05/18 1554)  BP: 125/86 (01/05/18 1554)  SpO2: 98 % (01/05/18 1554) Vital Signs (24h Range):  Temp:  [97.4 °F (36.3 °C)-98.1 °F (36.7 °C)] 98.1 °F (36.7 °C)  Pulse:  [] 98  Resp:  [16-21] 20  SpO2:  [96 %-100 %] 98 %  BP: (125-156)/(75-95) 125/86     Weight: 74.8 kg (164 lb 14.5 oz)  Body mass index is 28.31 kg/m².    Physical Exam   Constitutional: He is oriented to person, place, and time. He appears well-developed and well-nourished. He is cooperative. No distress.   HENT:   Head: Normocephalic and atraumatic.   Right Ear: External ear normal.   Left Ear: External ear normal.   Nose: No mucosal edema or sinus tenderness.   Mouth/Throat: Uvula is midline, oropharynx is clear and moist and mucous membranes are normal. No oropharyngeal exudate.   Eyes: Conjunctivae and EOM are normal. Pupils are equal, round, and reactive to light. No scleral icterus.   Neck: Phonation normal. Neck supple. No JVD present. No muscular tenderness present. Carotid bruit is not present. No tracheal deviation present.   Cardiovascular: Normal rate, regular rhythm, S1 normal and S2 normal.    No murmur heard.  Pulses:        Radial pulses are 2+ on the right side, and 2+ on the left side.        Dorsalis pedis pulses are 2+ on the right side, and 2+ on the left side.   Pulmonary/Chest: Effort normal and breath sounds normal. No respiratory distress. He has no wheezes. He has no rales. He exhibits no tenderness and no crepitus.   Abdominal: Soft. Bowel sounds are normal. He exhibits no distension. There is no tenderness. There is no rebound and no guarding.   Musculoskeletal: He exhibits no edema or tenderness.   Lymphadenopathy:        Right cervical: No superficial cervical adenopathy present.       Left cervical: No superficial cervical adenopathy present.        Right: No supraclavicular adenopathy present.        Left: No supraclavicular adenopathy present.   Neurological: He is alert and oriented to person, place, and time. He displays no tremor. He displays no seizure activity.   Decreased strength in the right leg with 2-3/5 dorsiflexsion of the ankle and knee extension.    Skin: Skin is warm and dry. He is not diaphoretic. No cyanosis or erythema. No pallor. Nails show no clubbing.   Psychiatric: He has a normal mood and affect. His behavior is normal. Thought content normal.   Nursing note and vitals reviewed.        CRANIAL NERVES     CN III, IV, VI   Pupils are equal, round, and reactive to light.  Extraocular motions are normal.        Significant Labs:   CBC:   Recent Labs  Lab 01/05/18  0510   WBC 18.25*   HGB 17.0   HCT 47.0        CMP:   Recent Labs  Lab 01/05/18  0510 01/05/18  1304   * 130*   K 5.0 4.2   CL 89* 96   CO2 18* 17*   GLU 81 83   BUN 47* 36*   CREATININE 1.1 0.8   CALCIUM 8.9 8.3*   PROT 6.9  --    ALBUMIN 3.1*  --    BILITOT 1.1*  --    ALKPHOS 69  --    AST 82*  --    ALT 87*  --    ANIONGAP 20* 17*   EGFRNONAA >60 >60     Coagulation:   Recent Labs  Lab 01/05/18  0510   INR 1.2     Lactic Acid:   Recent Labs  Lab 01/05/18  0510   LACTATE 2.0     Troponin:   Recent Labs  Lab 01/05/18  0510  01/05/18  1304   TROPONINI 0.119* 0.067*     TSH:   Recent Labs  Lab 01/05/18  1304   TSH 1.845     Urine Studies:   Recent Labs  Lab 01/05/18  0609   COLORU Brown*   APPEARANCEUA Clear   PHUR 5.0   SPECGRAV 1.025   PROTEINUA Trace*   GLUCUA Negative   KETONESU 1+*   BILIRUBINUA 2+*   OCCULTUA 1+*   NITRITE Negative   UROBILINOGEN 1.0   LEUKOCYTESUR Negative   RBCUA 1   WBCUA 0   BACTERIA None   SQUAMEPITHEL 0   HYALINECASTS 5*       Significant Imaging: CXR: I have reviewed all pertinent results/findings within the past 24 hours and my personal findings are:  No focal infiltrates     CT Head: No acute intracranial CT abnormalities. Symmetric hypodensities within the bilateral basal ganglia, possibly related to a remote anoxic episode and unchanged when compared to MRI dated 12/06/2017.

## 2018-01-05 NOTE — PROGRESS NOTES
Co-oximetry Results                                                                                                                         RESULTS        tHB            14.3    g/dl (M) 13.5-18(F)12-16          MetHb        1.1        %         0.4-1.5       COHb       0         %         <1.5        Results reported to:Dr. Fanrsworth    Date: 1/5/2018    Time:4204

## 2018-01-05 NOTE — CONSULTS
[facility] Progress Note    Spenser Rabago  1960  [id number]      Subjective:   Patient is a 57 y.o. male who was presented to [facility] on 1/5/2018 after calling EMS after a fall where he was unable to rise from floor. Patient fell on the floor last night around 8:30 pm and was unable to get up or ambulate secondary to pain and weakness. Patient reported multiple falls recently. He had a CT head performed that showed symmetric hypodensities in the bilateral basal ganglia. MRI brain performed today showed symmetric T2 flair hyperintensity in globus thalami. [facility] was consulted.     Upon Neurology's questioning of patient, patient did state that he used his stove to warm his home for the past few days. He says for about a week he has been feeling weak overall with decreased PO intake. He says he is confused about specifics but knows he fell because he was weak and could not get up. Patient denies headache, numbness, chest pain, SOB, nausea or vomiting.    Objective:  Vitals:    01/06/18 0500   BP:    Pulse: 76   Resp:    Temp:      Scheduled Meds:   aspirin  325 mg Oral Daily    atorvastatin  40 mg Oral Daily    enoxaparin  40 mg Subcutaneous Daily    sodium chloride 0.9%  3 mL Intravenous Q8H       Neurologic Physical Examination:     Mental Status: Alert. Oriented to self, month, location and situation. Flat affect. Patient follows commands but sometimes needs repeated coaching  Language: Hesitant, no dysarthria noted    Cranial Nerve:   PERRL, EOMI, face symmetric, hearing grossly intact bilaterally, tongue midline, shoulder shrug 5/5 bilaterally    Motor:  UEs: no pronator drift   5/5 strength bilaterally    LEs: patient states he cannot lift his legs of the bed 2/2 weakness  RLE 2/5 plantar/dorsiflexion  LLE 4/5 plantar/dorsiflexion    Sensory:    Intact to light touch throughout    DTR's:     Biceps 2+ bilaterally  Patellar 3+ RLE and 2+ LLE    Gait:     Deferred    Neuroimaging:     MRI  brain/MRA brain and neck    Persistent T2 FLAIR hyperintensity within the globus pallidi (without diffusion restriction on today's exam). Findings again suggest recent carbon monoxide poisoning or other prolonged hypoxic event.    New symmetric confluent T2/FLAIR hyperintensity and subtle diffusion restriction throughout the supratentorial white matter. 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  Other acute toxic leukoencephalopathy would have a similar appearance with broad differential considerations for toxic exposure include prescription drugs (antineoplastic, immunosuppressive, antimicrobial), non-prescription drugs (heroin, MDMA, ethanol) and environmental exposures (carbon monoxide, arsenic, carbon tetrachloride). An inflammatory/infectious etiology or other demyelinating process possible but thought less likely. Clinical correlation advised.      MR angiogram of the head and neck appears within normal limits. No high grade stenosis or major branch occlusion.    Assessment/Plan:   57 year old Male presented to ED after fall with weakness and confusion. Patient had MRI performed that shows symmetric, bilateral hyperintensities within the globus pallidus suggesting carbon monoxide poisoning. Patient likely experiencing delayed neuropsychiatric syndrome related to carbon monoxide poisoning.    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 Andrade who agrees with plan.    Ioana Cheng MD,   Neurology PGY 3

## 2018-01-05 NOTE — PLAN OF CARE
Problem: Occupational Therapy Goal  Goal: Occupational Therapy Goal  Goals to be met by: 2/5     Patient will increase functional independence with ADLs by performing:    UE Dressing with Set-up Assistance.  Grooming while standing at sink with Moderate Assistance.  Toileting from toilet with Moderate Assistance for hygiene and clothing management.   Supine to sit with Stand-by Assistance.  Toilet transfer to toilet with Moderate Assistance.  Increased functional strength to 4/5 for BUE.    Outcome: Ongoing (interventions implemented as appropriate)  OT eval performed, report to follow    Rec IPR at discharge

## 2018-01-05 NOTE — PT/OT/SLP EVAL
"Speech Language Pathology Evaluation  Bedside Swallow    Patient Name:  Spenser Rabago   MRN:  [id number]  Admitting Diagnosis: Non-traumatic rhabdomyolysis    Recommendations:                 General Recommendations:  Follow-up not indicated  Diet recommendations:  Regular, Thin   Aspiration Precautions: Standard aspiration precautions   General Precautions: Standard, fall  Communication strategies:  none    History:     Past Medical History:   Diagnosis Date    ADD (attention deficit disorder)     Depression     Headache(784.0)     Hypertension     Migraines      Chief Complaint   Patient presents with    Fall       pt. called 911 s/p fall. pt states he has been on floor unable to get up since approx. 830pm last night. pt. also states he fell tuesday and has rt. foot pain/swelling. pt. has scab to lt. cheek, abrasion with redness/swelling to rt. hand.         Spenser Rabago is a 57 y.o. male who presents to the ED with complaints of right foot pain secondary to a fall. Per EMS, patient fell at approximately 8:30PM last night and was unable to get back up or ambulate secondary to pain and generalized weakness. Patient reports having multiple falls in the past. He denies chest pain, SOB, LOC, headache, dizziness, lightheadedness, and N/V/D. He denies any other symptoms. There are no identifying, exacerbating, or alleviating factors for symptoms.      The history is provided by the patient and the EMS personnel      History reviewed. No pertinent surgical history.    Social History: Patient lives at home, history of multiple falls.     Prior diet: pt reports no trouble swallowing at home. He is on a regular diet and thin liquids at home.     Subjective     Consult received for swallow eval. Pt seen in ED. SLP did clear with RN for eval.   Patient goals: "Can I eat the rest of that pudding?"     Objective:     Pt seen in ED, pt found in stretcher. He required min A to reposition upright in bed.   Pt " "agreeable to po trials and said, "I've already had water to drink too."  Pt alert, oriented x4, answered basic questions and recalled personal info.     Oral Musculature Evaluation  · Oral Musculature: WFL  · Structural Abnormalities:  (scab on L cheek )  · Dentition: present and adequate  · Mucosal Quality: good, adequate  · Mandibular Strength and Mobility: WFL  · Oral Labial Strength and Mobility: WFL  · Lingual Strength and Mobility: WFL  · Buccal Strength and Mobility: WFL  · Volitional Cough: elicited  · Volitional Swallow: timely swallow  · Voice Prior to PO Intake: clear voice  · Oral Musculature Comments: no facial droop or facial weakness present    Bedside Swallow Eval:   Consistencies Assessed:  · Thin liquids self fed water by cup and straw  · Puree pudding self fed  · Solids nena cracker     Oral Phase:   · WNL    Pharyngeal Phase:   · no overt clinical signs/symptoms of aspiration  · no overt clinical signs/symptoms of pharyngeal dysphagia    Compensatory Strategies  · None    Treatment: no further skilled ST needs      Assessment:     Spenser Rabago is a 57 y.o. male with an SLP diagnosis of timely oral and pharyngeal phase of the swallow. No further skilled ST needs.  Pt safe to initiate a regular diet and thin liquids.     Goals:    SLP Goals     Not on file          Multidisciplinary Problems (Resolved)        Problem: SLP Goal    Goal Priority Disciplines Outcome   SLP Goal   (Resolved)     SLP Outcome(s) achieved                   Plan:     · Patient to be seen:      · Plan of Care expires:     · Plan of Care reviewed with:  patient (Rn, MD)   · SLP Follow-Up:  No       Discharge recommendations:   (no further acute ST needs)     Time Tracking:     SLP Treatment Date:   01/05/18  Speech Start Time:  1332  Speech Stop Time:  1345     Speech Total Time (min):  13 min    Billable Minutes: Eval Swallow and Oral Function 13    KAILA Nash, CCC-SLP  01/05/2018           "

## 2018-01-05 NOTE — PT/OT/SLP EVAL
Occupational Therapy   Evaluation    Name: Spenser Rabago  MRN: [id number]  Admitting Diagnosis:  Non-traumatic rhabdomyolysis      Recommendations:     Discharge Recommendations: rehabilitation facility  Discharge Equipment Recommendations:  other (see comments) (TBD)  Barriers to discharge:  Decreased caregiver support    History:     Occupational Profile:  Living Environment: Pt reports he lives alone in [facility] [place] no [place] W[place]  Previous level of function: Indep and driving  Roles and Routines: reports he works FT at [employer]  Equipment Owned:  none  Assistance upon Discharge: ongoing assessment    Past Medical History:   Diagnosis Date    ADD (attention deficit disorder)     Depression     Headache(784.0)     Hypertension     Migraines        History reviewed. No pertinent surgical history.    Subjective     Chief Complaint: weakness, confusion  Patient/Family stated goals: none stated  Communicated with: nurse prior to session.  Pain/Comfort:  · Pain Rating 1: 0/10  · Pain Rating Post-Intervention 1: 0/10    Objective:     Patient found with: peripheral IV    General Precautions: Standard, fall   Orthopedic Precautions:    Braces:       Occupational Performance:    Bed Mobility:    · Patient completed Rolling/Turning to Left with  moderate assistance and maximal assistance  · Patient completed Scooting/Bridging with moderate assistance and maximal assistance  · Patient completed Supine to Sit with maximal assistance  · Patient completed Sit to Supine with maximal assistance    Functional Mobility/Transfers:  · Patient completed Sit <> Stand Transfer with maximal assistance  with  rolling walker   · Patient completed Bed <> Chair Transfer using Step Transfer technique with maximal assistance with rolling walker    Activities of Daily Living:  · LB Dressing: dependence socks    Cognitive/Visual Perceptual:  Pt alert, flat affect, oriented to self and year only. Delayed responses    Physical Exam:  Moderate  "edema RLE,  Min Edema LLE, moderate edema L hand  Minimal hair growth BLE    Multiple bruises, abrasions throughout back and extremities  BUE AROM WFL, strength grossly 3/5, sensation decreased    Sitting balance Fair/fair+  Standing balance Poor    Patient left HOB elevated with all lines intact, call button in reach, bed alarm on and nurse and PT present    [facility] 6 Click:  [facility] Total Score: 12    Treatment & Education:  Pt educated on role of OT/POC.  Pt performed functional mobility as above. Pt initially reported no dizziness upon standing, functional mobility w/RW max A ~ 2-3 feet.  Pt c/o dizziness upon sitting.  Rest break given then scooted to HOB w/assist.  ZFlex boots obtained and donned by PT.  Communicated w/nurse findings of eval, skin assessment and donning of boots.  Education:    Assessment:     Spenser Rabago is a 57 y.o. male with a medical diagnosis of Non-traumatic rhabdomyolysis.  He presents with performance deficits affecting function are weakness, impaired endurance, impaired sensation, impaired self care skills, impaired balance, gait instability, impaired functional mobilty, impaired cognition, decreased coordination, decreased upper extremity function, decreased lower extremity function, decreased ROM, decreased safety awareness, impaired skin, edema.      Rehab Prognosis:  good; patient would benefit from acute skilled OT services to address these deficits and reach maximum level of function.         Clinical Decision Makin.  OT Low:  "Pt evaluation falls under low complexity for evaluation coding due to performance deficits noted in 1-3 areas as stated above and 0 co-morbities affecting current functional status. Data obtained from problem focused assessments. No modifications or assistance was required for completion of evaluation. Only brief occupational profile and history review completed."     Plan:     Patient to be seen 5 x/week to address the above listed problems " via self-care/home management, therapeutic activities, therapeutic exercises  · Plan of Care Expires: 02/05/18  · Plan of Care Reviewed with: patient    This Plan of care has been discussed with the patient who was involved in its development and understands and is in agreement with the identified goals and treatment plan    GOALS:    Occupational Therapy Goals        Problem: Occupational Therapy Goal    Goal Priority Disciplines Outcome Interventions   Occupational Therapy Goal     OT, PT/OT Ongoing (interventions implemented as appropriate)    Description:  Goals to be met by: 2/5     Patient will increase functional independence with ADLs by performing:    UE Dressing with Set-up Assistance.  Grooming while standing at sink with Moderate Assistance.  Toileting from toilet with Moderate Assistance for hygiene and clothing management.   Supine to sit with Stand-by Assistance.  Toilet transfer to toilet with Moderate Assistance.  Increased functional strength to 4/5 for BUE.                      Time Tracking:     OT Date of Treatment: 01/05/18  OT Start Time: 1543  OT Stop Time: 1621  OT Total Time (min): 38 min w/PT    Billable Minutes:Evaluation 15  Therapeutic Activity 8    Armond Barger OT  1/5/2018

## 2018-01-05 NOTE — PLAN OF CARE
Plan of Care Note    Patient is a 57 year old Male who [facility] after a fall where he was unable to get up from the floor. Patient fell on the floor last night around 8:30 pm and was unable to get up or ambulate secondary to pain and weakness. Patient reported multiple falls recently. He had a CT head performed that showed symmetric hypodensities in the bilateral basal ganglia. MRI brain performed today showed symmetric T2 flair hyperintensity in globus thalami. [facility] was consulted.     Full consult note to follow.     Ioana Wyman MD  [facility], PGY III

## 2018-01-05 NOTE — PROVIDER PROGRESS NOTES - EMERGENCY DEPT.
Encounter Date: 1/5/2018    ED Physician Progress Notes             This is an assumption of care note.     Upon shift change, the patient was transferred to me from Dr. Monroe @ 5:56 AM in stable condition.     Update: No acute events during shift.     Imaging negative for acute traumatic injury.  Labs with WBC 18k, no obvious infection present. Hyponatremia, hypochloremia, likely dehydration. Moderate elevation in CK to 1750. Troponin elevated to 0.1, no chest pain or ischemia on EKG. Will admit for IVF and troponin trend. May require placement for deconditioning as patient lives alone currently.    6:53 AM  Paged [facility]    EKG:  NSR rate 75, no ST changes or ischemia    IMAGING:  Imaging Results          X-Ray Hand 3 view Left (Final result)  Result time 01/05/18 06:58:53    Final result by Kristen Mac MD (01/05/18 06:58:53)                 Impression:        No fractures.      Electronically signed by: KRISTEN MAC MD  Date:     01/05/18  Time:    06:58              Narrative:    Technique: PA, lateral and oblique views of the left hand.    Comparison: None.    Findings:    No fractures.  No osseous destruction.  Mild cartilage space narrowing of the DIP joints noted.  Remaining cartilage spaces are well preserved.  Subcutaneous soft tissues are within normal limits.  No radiopaque foreign bodies.                             CT Cervical Spine Without Contrast (Final result)  Result time 01/05/18 05:53:00    Final result by Kristen Mac MD (01/05/18 05:53:00)                 Impression:        No fractures.  No traumatic subluxation or dislocation.      Electronically signed by: KRISTEN MAC MD  Date:     01/05/18  Time:    05:53              Narrative:    Technique: 2.5 mm axial images were obtained through the cervical spine without the use of contrast.  Coronal and sagittal reformat were performed.    Comparison: None.    Findings:    Cervical spine alignment demonstrates 2  mm of retrolisthesis of C3 on C4.  Occipital condyles and odontoid process are intact.  Atlantooccipital and atlantoaxial alignment are within normal limits.  Atlantodens interval is within normal limits allowing for degenerative changes.  Vertebral body heights are well-maintained without evidence for fracture.  Facet joints are well aligned.  Transverse foramina are unremarkable.  Posterior elements demonstrate no significant abnormalities.  Mandibular condyles are in their expected location.    Multilevel degenerative changes identified, most pronounced at C3-C4 and C6-C7.  No large focal disc herniation by CT criteria.    Prevertebral soft tissues are normal.  Parotid, submandibular and thyroid glands are within normal limits.  No cervical lymph node enlargement.  Lung apices are clear.  Paraspinal musculature demonstrates no significant abnormalities.                             CT Head Without Contrast (Final result)  Result time 01/05/18 05:47:15    Final result by Kristen Mac MD (01/05/18 05:47:15)                 Impression:        No acute intracranial CT abnormalities.    Symmetric hypodensities within the bilateral basal ganglia, possibly related to a remote anoxic episode and unchanged when compared to MRI dated 12/06/2017.      Electronically signed by: KRISTEN MAC MD  Date:     01/05/18  Time:    05:47              Narrative:    Technique: 5-mm axial images were obtained through the head without the use of IV contrast. Coronal and sagittal reformats were performed.    Comparison: None.    Findings:    No CT findings to suggest an acute major vascular distribution infarct.  Symmetric hypodensities noted within the bilateral basal ganglia.  No intra-or extra-axial hemorrhage.  No hydrocephalus.  No midline shift or mass effect.  Sellar region is unremarkable.    Paranasal sinuses and mastoids are clear.  No acute osseous abnormalities.  Globes are symmetric.  Subcutaneous soft tissues are  normal.                             X-Ray Foot Complete Right (Final result)  Result time 01/05/18 05:41:57    Final result by Kristen Mac MD (01/05/18 05:41:57)                 Impression:        No fractures.      Electronically signed by: KRISTEN MAC MD  Date:     01/05/18  Time:    05:41              Narrative:    Technique: AP, lateral and oblique views of the right foot.    Comparison: None.    Findings:    No fractures.  No osseous destruction.  Distal Achilles enthesopathy noted.  Cartilage spaces are well preserved.  Tarsometatarsal alignment is well maintained noting nonweightbearing films. Subcutaneous soft tissues demonstrate minimal swelling along the dorsum of the midfoot.                             X-Ray Chest AP Portable (Final result)  Result time 01/05/18 05:48:34    Final result by Kristen Mac MD (01/05/18 05:48:34)                 Impression:        No acute radiographic findings in the chest on this single view.      Electronically signed by: KRISTEN MAC MD  Date:     01/05/18  Time:    05:48              Narrative:    Technique: AP chest radiograph.    Comparison: None.    Findings:    Mediastinal structures are midline.  Cardiac silhouette is normal in size.  Lung volumes are normal and symmetric.  No focal consolidation.  No pneumothorax or pleural effusions.  No acute osseous abnormalities.  Degenerative changes of the spine noted. No free air beneath the diaphragm.                                LABS:  Labs Reviewed   CBC W/ AUTO DIFFERENTIAL - Abnormal; Notable for the following:        Result Value    WBC 18.25 (*)     MCHC 36.2 (*)     Gran # 15.5 (*)     Mono # 1.4 (*)     Gran% 84.5 (*)     Lymph% 7.4 (*)     All other components within normal limits   COMPREHENSIVE METABOLIC PANEL - Abnormal; Notable for the following:     Sodium 127 (*)     Chloride 89 (*)     CO2 18 (*)     BUN, Bld 47 (*)     Albumin 3.1 (*)     Total Bilirubin 1.1 (*)     AST 82 (*)     ALT 87  (*)     Anion Gap 20 (*)     All other components within normal limits   URINALYSIS - Abnormal; Notable for the following:     Color, UA Brown (*)     Protein, UA Trace (*)     Ketones, UA 1+ (*)     Bilirubin (UA) 2+ (*)     Occult Blood UA 1+ (*)     All other components within normal limits   PROTIME-INR - Abnormal; Notable for the following:     Prothrombin Time 12.7 (*)     All other components within normal limits   TROPONIN I - Abnormal; Notable for the following:     Troponin I 0.119 (*)     All other components within normal limits   CK - Abnormal; Notable for the following:     CPK 1759 (*)     All other components within normal limits   URINALYSIS MICROSCOPIC - Abnormal; Notable for the following:     Hyaline Casts, UA 5 (*)     All other components within normal limits   CULTURE, BLOOD   CULTURE, BLOOD   INFLUENZA A AND B ANTIGEN   ALCOHOL,MEDICAL (ETHANOL)   LACTIC ACID, PLASMA   DRUG SCREEN PANEL, URINE EMERGENCY   B-TYPE NATRIURETIC PEPTIDE   INFLUENZA A AND B ANTIGEN   AMMONIA         IMPRESSION:  1. Fall    2. Muscular deconditioning    3. Hyponatremia    4. Dehydration    5. Right foot pain    6. Recurrent falls    7. Elevated CK           DISPOSITION:  TBA to [facility]

## 2018-01-05 NOTE — ED PROVIDER NOTES
Encounter Date: 1/5/2018    SCRIBE #1 NOTE: I, Vee Carlson, am scribing for, and in the presence of, Dr. Monroe.       History     Chief Complaint   Patient presents with    Fall     pt. called 911 s/p fall. pt states he has been on floor unable to get up since approx. 830pm last night. pt. also states he fell tuesday and has rt. foot pain/swelling. pt. has scab to lt. cheek, abrasion with redness/swelling to rt. hand.      Spenser Rabago is a 57 y.o. male who presents to the ED with complaints of right foot pain secondary to a fall. Per EMS, patient fell at approximately 8:30PM last night and was unable to get back up or ambulate secondary to pain and generalized weakness. Patient reports having multiple falls in the past. He denies chest pain, SOB, LOC, headache, dizziness, lightheadedness, and N/V/D. He denies any other symptoms. There are no identifying, exacerbating, or alleviating factors for symptoms.       The history is provided by the patient and the EMS personnel.     Review of patient's allergies indicates:   Allergen Reactions    Penicillins Hives     Past Medical History:   Diagnosis Date    ADD (attention deficit disorder)     Depression     Headache(784.0)     Hypertension     Migraines      History reviewed. No pertinent surgical history.  Family History   Problem Relation Age of Onset    Heart disease Mother     Heart disease Father      Social History   Substance Use Topics    Smoking status: Never Smoker    Smokeless tobacco: Never Used    Alcohol use No     Review of Systems   Constitutional: Positive for fatigue. Negative for chills and fever.   HENT: Negative for congestion, rhinorrhea, sneezing and sore throat.    Eyes: Negative for photophobia, pain and redness.   Respiratory: Negative for cough, chest tightness, shortness of breath and wheezing.    Cardiovascular: Negative for chest pain, palpitations and leg swelling.   Gastrointestinal: Negative for abdominal distention,  abdominal pain, constipation, diarrhea, nausea and vomiting.   Genitourinary: Negative for difficulty urinating, dysuria, frequency, hematuria and urgency.   Musculoskeletal: Negative for back pain, neck pain and neck stiffness.   Neurological: Negative for seizures, syncope, numbness and headaches.   All other systems reviewed and are negative.      Physical Exam     Initial Vitals [01/05/18 0430]   BP Pulse Resp Temp SpO2   139/85 92 18 97.4 °F (36.3 °C) 96 %      MAP       103         Physical Exam    Nursing note and vitals reviewed.  Constitutional: He appears well-developed and well-nourished. No distress.   HENT:   Head: Normocephalic and atraumatic.   Mouth/Throat: Oropharynx is clear and moist. Mucous membranes are dry.   Healing abrasion to left cheek. Oropharynx clear   Eyes: Conjunctivae and EOM are normal. Pupils are equal, round, and reactive to light.   Neck: Normal range of motion. Neck supple. No tracheal deviation present.   Cardiovascular: Normal rate, regular rhythm, normal heart sounds and intact distal pulses.   Pulmonary/Chest: Breath sounds normal. No respiratory distress. He has no wheezes. He has no rhonchi. He has no rales.   Abrasions and contusions to left chest wall.    Abdominal: Soft. Bowel sounds are normal. He exhibits no distension. There is no tenderness. There is no rebound.   Musculoskeletal: Normal range of motion. He exhibits tenderness.        Right foot: There is swelling.   Ecchymosis to right mid foot with mild swelling, +TTP, distal CR intact   Neurological: He is alert and oriented to person, place, and time. He has normal strength. No cranial nerve deficit or sensory deficit.   Skin: Skin is warm and dry. Capillary refill takes less than 2 seconds.         ED Course   Procedures  Labs Reviewed   CULTURE, BLOOD   CULTURE, BLOOD   CBC W/ AUTO DIFFERENTIAL   COMPREHENSIVE METABOLIC PANEL   URINALYSIS   INFLUENZA A AND B ANTIGEN   DRUG SCREEN PANEL, URINE EMERGENCY    ALCOHOL,MEDICAL (ETHANOL)   LACTIC ACID, PLASMA   PROTIME-INR   TROPONIN I   B-TYPE NATRIURETIC PEPTIDE   CK     EKG Readings: (Independently Interpreted)   Initial Reading: No STEMI. Previous EKG: Compared with most recent EKG Previous EKG Date: 4/3/16 (Minimal change). Rhythm: Normal Sinus Rhythm. Heart Rate: 75. Ectopy: No Ectopy. Conduction: Normal. ST Segments: Normal ST Segments. T Waves: Normal. Axis: Normal.       X-Rays:   Independently Interpreted Readings:   Other Readings:  I have visualized all imaging for this patient, radiology has done the interpretation.     Medical Decision Making:   History:   Old Medical Records: I decided to obtain old medical records.  Clinical Tests:   Lab Tests: Reviewed and Ordered  Radiological Study: Reviewed and Ordered  Medical Tests: Reviewed and Ordered  ED Management:  Patient was handed off to Dr. Benson for f/u of lab work and radiology results. Please see his note for management and disposition.   Other:   I have discussed this case with another health care provider.            Scribe Attestation:   Scribe #1: I performed the above scribed service and the documentation accurately describes the services I performed. I attest to the accuracy of the note.            ED Course      Clinical Impression:   The primary encounter diagnosis was Fall. Diagnoses of Muscular deconditioning, Hyponatremia, Dehydration, Right foot pain, Recurrent falls, Elevated CK, Murmur, Non-traumatic rhabdomyolysis, Mixed migraine and muscle contraction headache, Depression, unspecified depression type, Essential hypertension, Bacteremia, and Bacteremia due to Staphylococcus aureus were also pertinent to this visit.            I, Dr. Brian Monroe, personally performed the services described in this documentation. All medical record entries made by the scribe were at my direction and in my presence.  I have reviewed the chart and agree that the record reflects my personal performance and  is accurate and complete. Brian Monroe MD.  5:16 AM 01/05/2018                   Brian Monroe MD  01/13/18 0644

## 2018-01-06 LAB
ALBUMIN SERPL BCP-MCNC: 2.2 G/DL
ALP SERPL-CCNC: 52 U/L
ALT SERPL W/O P-5'-P-CCNC: 50 U/L
ANION GAP SERPL CALC-SCNC: 10 MMOL/L
ANION GAP SERPL CALC-SCNC: 10 MMOL/L
AST SERPL-CCNC: 40 U/L
BASOPHILS # BLD AUTO: 0.01 K/UL
BASOPHILS NFR BLD: 0.1 %
BILIRUB SERPL-MCNC: 0.7 MG/DL
BUN SERPL-MCNC: 24 MG/DL
BUN SERPL-MCNC: 24 MG/DL
CALCIUM SERPL-MCNC: 7.7 MG/DL
CALCIUM SERPL-MCNC: 7.7 MG/DL
CHLORIDE SERPL-SCNC: 96 MMOL/L
CHLORIDE SERPL-SCNC: 96 MMOL/L
CK SERPL-CCNC: 518 U/L
CO2 SERPL-SCNC: 22 MMOL/L
CO2 SERPL-SCNC: 22 MMOL/L
CREAT SERPL-MCNC: 0.8 MG/DL
CREAT SERPL-MCNC: 0.8 MG/DL
DIFFERENTIAL METHOD: ABNORMAL
EOSINOPHIL # BLD AUTO: 0 K/UL
EOSINOPHIL NFR BLD: 0.1 %
ERYTHROCYTE [DISTWIDTH] IN BLOOD BY AUTOMATED COUNT: 12.4 %
EST. GFR  (AFRICAN AMERICAN): >60 ML/MIN/1.73 M^2
EST. GFR  (AFRICAN AMERICAN): >60 ML/MIN/1.73 M^2
EST. GFR  (NON AFRICAN AMERICAN): >60 ML/MIN/1.73 M^2
EST. GFR  (NON AFRICAN AMERICAN): >60 ML/MIN/1.73 M^2
GLUCOSE SERPL-MCNC: 99 MG/DL
GLUCOSE SERPL-MCNC: 99 MG/DL
HCT VFR BLD AUTO: 37 %
HGB BLD-MCNC: 12.7 G/DL
LYMPHOCYTES # BLD AUTO: 1.7 K/UL
LYMPHOCYTES NFR BLD: 12.9 %
MAGNESIUM SERPL-MCNC: 1.5 MG/DL
MCH RBC QN AUTO: 29.2 PG
MCHC RBC AUTO-ENTMCNC: 34.3 G/DL
MCV RBC AUTO: 85 FL
MONOCYTES # BLD AUTO: 1.2 K/UL
MONOCYTES NFR BLD: 9.2 %
NEUTROPHILS # BLD AUTO: 10 K/UL
NEUTROPHILS NFR BLD: 77.4 %
PHOSPHATE SERPL-MCNC: 2 MG/DL
PLATELET # BLD AUTO: 289 K/UL
PMV BLD AUTO: 10.2 FL
POTASSIUM SERPL-SCNC: 3.6 MMOL/L
POTASSIUM SERPL-SCNC: 3.6 MMOL/L
PROT SERPL-MCNC: 5.3 G/DL
RBC # BLD AUTO: 4.35 M/UL
RPR SER QL: NORMAL
SODIUM SERPL-SCNC: 128 MMOL/L
SODIUM SERPL-SCNC: 128 MMOL/L
WBC # BLD AUTO: 12.87 K/UL

## 2018-01-06 PROCEDURE — A4216 STERILE WATER/SALINE, 10 ML: HCPCS | Performed by: HOSPITALIST

## 2018-01-06 PROCEDURE — 36415 COLL VENOUS BLD VENIPUNCTURE: CPT

## 2018-01-06 PROCEDURE — 97112 NEUROMUSCULAR REEDUCATION: CPT

## 2018-01-06 PROCEDURE — 84100 ASSAY OF PHOSPHORUS: CPT

## 2018-01-06 PROCEDURE — 80048 BASIC METABOLIC PNL TOTAL CA: CPT

## 2018-01-06 PROCEDURE — 25000003 PHARM REV CODE 250: Performed by: HOSPITALIST

## 2018-01-06 PROCEDURE — 85025 COMPLETE CBC W/AUTO DIFF WBC: CPT

## 2018-01-06 PROCEDURE — 11000001 HC ACUTE MED/SURG PRIVATE ROOM

## 2018-01-06 PROCEDURE — 80053 COMPREHEN METABOLIC PANEL: CPT

## 2018-01-06 PROCEDURE — 97530 THERAPEUTIC ACTIVITIES: CPT

## 2018-01-06 PROCEDURE — 94761 N-INVAS EAR/PLS OXIMETRY MLT: CPT

## 2018-01-06 PROCEDURE — 63600175 PHARM REV CODE 636 W HCPCS: Performed by: HOSPITALIST

## 2018-01-06 PROCEDURE — 82550 ASSAY OF CK (CPK): CPT

## 2018-01-06 PROCEDURE — 83735 ASSAY OF MAGNESIUM: CPT

## 2018-01-06 RX ORDER — LORAZEPAM/0.9% SODIUM CHLORIDE 100MG/0.1L
2 PLASTIC BAG, INJECTION (ML) INTRAVENOUS
Status: COMPLETED | OUTPATIENT
Start: 2018-01-06 | End: 2018-01-06

## 2018-01-06 RX ORDER — SODIUM,POTASSIUM PHOSPHATES 280-250MG
2 POWDER IN PACKET (EA) ORAL
Status: DISCONTINUED | OUTPATIENT
Start: 2018-01-06 | End: 2018-01-08

## 2018-01-06 RX ADMIN — ATORVASTATIN CALCIUM 40 MG: 40 TABLET, FILM COATED ORAL at 09:01

## 2018-01-06 RX ADMIN — SODIUM CHLORIDE, PRESERVATIVE FREE 3 ML: 5 INJECTION INTRAVENOUS at 05:01

## 2018-01-06 RX ADMIN — ENOXAPARIN SODIUM 40 MG: 100 INJECTION SUBCUTANEOUS at 05:01

## 2018-01-06 RX ADMIN — SODIUM CHLORIDE: 0.9 INJECTION, SOLUTION INTRAVENOUS at 04:01

## 2018-01-06 RX ADMIN — POTASSIUM & SODIUM PHOSPHATES POWDER PACK 280-160-250 MG 2 PACKET: 280-160-250 PACK at 05:01

## 2018-01-06 RX ADMIN — REGULAR STRENGTH 325 MG: 325 TABLET ORAL at 09:01

## 2018-01-06 RX ADMIN — POTASSIUM & SODIUM PHOSPHATES POWDER PACK 280-160-250 MG 2 PACKET: 280-160-250 PACK at 11:01

## 2018-01-06 RX ADMIN — POTASSIUM & SODIUM PHOSPHATES POWDER PACK 280-160-250 MG 2 PACKET: 280-160-250 PACK at 08:01

## 2018-01-06 RX ADMIN — THERA TABS 1 TABLET: TAB at 11:01

## 2018-01-06 RX ADMIN — MAGNESIUM SULFATE IN WATER 2 G: 40 INJECTION, SOLUTION INTRAVENOUS at 02:01

## 2018-01-06 RX ADMIN — MAGNESIUM SULFATE IN WATER 2 G: 40 INJECTION, SOLUTION INTRAVENOUS at 11:01

## 2018-01-06 RX ADMIN — SODIUM CHLORIDE, PRESERVATIVE FREE 3 ML: 5 INJECTION INTRAVENOUS at 02:01

## 2018-01-06 NOTE — PLAN OF CARE
Problem: Physical Therapy Goal  Goal: Physical Therapy Goal  Goals to be met by: 18     Patient will increase functional independence with mobility by performin. Supine to sit with Stand-by Assistance  2. Sit to supine with Stand-by Assistance  3. Sit to stand transfer with Moderate Assistance  4. Bed to chair transfer with Moderate Assistance using least restirctive AD  5.  Assess gait      Outcome: Ongoing (interventions implemented as appropriate)  Pt seen in bed-remains confused , flat affect.  incontinent of urine.  Max assist for funtional mobility.  Worked on sit and standing balance.  Family friend present.  States she talked to sister in law who stated pt has not worked since before .  Brother took pt to MD  2* c/o numbness and tingling in arms and legs-unsure of date/?before or after .  Was supposed to get MRI-friend is unsure if done -per chart had  MRI  17 then again yesterday.  Friend also reports pt having blackouts as far back as year ago.  Friend states pt texted her at 2am Fri morning stating he had fallen and was too weak to get up.  Friend called pt on house line as cell phone could not make or receive call.  When pt unable to answer she called 911.    REC:  IPR

## 2018-01-06 NOTE — H&P
[facility]  History & Physical    Patient Name: Spenser Rabago  MRN: [id number]  Admission Date: 1/5/2018  Attending Physician: Manjit Farnsworth MD  Primary Care Provider: Vladimir Apple MD         Patient information was obtained from patient, past medical records and ER records.     Subjective:     Principal Problem:Non-traumatic rhabdomyolysis    Chief Complaint:   Chief Complaint   Patient presents with    Fall     pt. called 911 s/p fall. pt states he has been on floor unable to get up since approx. 830pm last night. pt. also states he fell tuesday and has rt. foot pain/swelling. pt. has scab to lt. cheek, abrasion with redness/swelling to rt. hand.         HPI: Mr. Rabago is a 56 yo WM with chronic headaches/migraines, ADD, and HTN that presented to [facility] after calling EMS because he fell and could not get up. He reports being on the floor at least 6 hours prior to EMS arrival. He says that 4 days PTA a box fell on his right foot and he has had pain and difficulty walking since that time. He reports falling yesterday and having pain in his left hand as well. He says that he has been falling a lot over the last few days. He also reports having decreased appetite and oral intake over the last week. He denies any nausea or vomiting.     Past Medical History:   Diagnosis Date    ADD (attention deficit disorder)     Depression     Headache(784.0)     Hypertension     Migraines        History reviewed. No pertinent surgical history.    Review of patient's allergies indicates:   Allergen Reactions    Penicillins Hives       No current facility-administered medications on file prior to encounter.      Current Outpatient Prescriptions on File Prior to Encounter   Medication Sig    ALPRAZolam (XANAX) 1 MG tablet Take 1 tablet (1 mg total) by mouth 2 (two) times daily.    baclofen (LIORESAL) 10 MG tablet Take 10 mg by mouth 3 (three) times daily.     buPROPion (WELLBUTRIN  XL) 150 MG TB24 tablet     dextroamphetamine-amphetamine (ADDERALL) 20 mg tablet Take 1 tablet by mouth 3 (three) times daily.     lisinopril 10 MG tablet Take 10 mg by mouth once daily.    metoprolol succinate (TOPROL-XL) 50 MG 24 hr tablet Take 1 tablet (50 mg total) by mouth once daily.    oxyCODONE-acetaminophen (PERCOCET)  mg per tablet     oxyMORphone (OPANA ER) 10 MG 12 hr tablet     tizanidine (ZANAFLEX) 4 MG tablet Take 4 mg by mouth every 8 (eight) hours.    topiramate (TOPAMAX) 100 MG tablet 100 mg every evening.     trazodone (DESYREL) 50 MG tablet      Family History     Problem Relation (Age of Onset)    Heart disease Mother, Father    Hypertension Mother, Father        Social History Main Topics    Smoking status: Never Smoker    Smokeless tobacco: Never Used    Alcohol use 0.6 oz/week     1 Shots of liquor per week    Drug use: No    Sexual activity: Not on file     Review of Systems   Constitutional: Positive for appetite change and fatigue. Negative for chills and fever.   HENT: Negative for congestion, hearing loss, nosebleeds, sore throat and tinnitus.    Eyes: Negative for discharge, itching and visual disturbance.   Respiratory: Negative for cough, chest tightness and shortness of breath.    Cardiovascular: Negative for chest pain and palpitations.   Gastrointestinal: Negative for abdominal pain, blood in stool, diarrhea, nausea and vomiting.   Endocrine: Negative for cold intolerance and polydipsia.   Genitourinary: Negative for difficulty urinating, dysuria and hematuria.   Musculoskeletal: Positive for myalgias. Negative for arthralgias and neck stiffness.   Skin: Negative for rash and wound.   Allergic/Immunologic: Negative for environmental allergies and immunocompromised state.   Neurological: Positive for weakness. Negative for dizziness, tremors and headaches.   Hematological: Does not bruise/bleed easily.   Psychiatric/Behavioral: Negative for agitation and  confusion. The patient is not nervous/anxious.      Objective:     Vital Signs (Most Recent):  Temp: 98.1 °F (36.7 °C) (01/05/18 1337)  Pulse: 98 (01/05/18 1554)  Resp: 20 (01/05/18 1554)  BP: 125/86 (01/05/18 1554)  SpO2: 98 % (01/05/18 1554) Vital Signs (24h Range):  Temp:  [97.4 °F (36.3 °C)-98.1 °F (36.7 °C)] 98.1 °F (36.7 °C)  Pulse:  [] 98  Resp:  [16-21] 20  SpO2:  [96 %-100 %] 98 %  BP: (125-156)/(75-95) 125/86     Weight: 74.8 kg (164 lb 14.5 oz)  Body mass index is 28.31 kg/m².    Physical Exam   Constitutional: He is oriented to person, place, and time. He appears well-developed and well-nourished. He is cooperative. No distress.   HENT:   Head: Normocephalic and atraumatic.   Right Ear: External ear normal.   Left Ear: External ear normal.   Nose: No mucosal edema or sinus tenderness.   Mouth/Throat: Uvula is midline, oropharynx is clear and moist and mucous membranes are normal. No oropharyngeal exudate.   Eyes: Conjunctivae and EOM are normal. Pupils are equal, round, and reactive to light. No scleral icterus.   Neck: Phonation normal. Neck supple. No JVD present. No muscular tenderness present. Carotid bruit is not present. No tracheal deviation present.   Cardiovascular: Normal rate, regular rhythm, S1 normal and S2 normal.    No murmur heard.  Pulses:       Radial pulses are 2+ on the right side, and 2+ on the left side.        Dorsalis pedis pulses are 2+ on the right side, and 2+ on the left side.   Pulmonary/Chest: Effort normal and breath sounds normal. No respiratory distress. He has no wheezes. He has no rales. He exhibits no tenderness and no crepitus.   Abdominal: Soft. Bowel sounds are normal. He exhibits no distension. There is no tenderness. There is no rebound and no guarding.   Musculoskeletal: He exhibits no edema or tenderness.   Lymphadenopathy:        Right cervical: No superficial cervical adenopathy present.       Left cervical: No superficial cervical adenopathy present.         Right: No supraclavicular adenopathy present.        Left: No supraclavicular adenopathy present.   Neurological: He is alert and oriented to person, place, and time. He displays no tremor. He displays no seizure activity.   Decreased strength in the right leg with 2-3/5 dorsiflexsion of the ankle and knee extension.    Skin: Skin is warm and dry. He is not diaphoretic. No cyanosis or erythema. No pallor. Nails show no clubbing.   Psychiatric: He has a normal mood and affect. His behavior is normal. Thought content normal.   Nursing note and vitals reviewed.        CRANIAL NERVES     CN III, IV, VI   Pupils are equal, round, and reactive to light.  Extraocular motions are normal.        Significant Labs:   CBC:   Recent Labs  Lab 01/05/18  0510   WBC 18.25*   HGB 17.0   HCT 47.0        CMP:   Recent Labs  Lab 01/05/18  0510 01/05/18  1304   * 130*   K 5.0 4.2   CL 89* 96   CO2 18* 17*   GLU 81 83   BUN 47* 36*   CREATININE 1.1 0.8   CALCIUM 8.9 8.3*   PROT 6.9  --    ALBUMIN 3.1*  --    BILITOT 1.1*  --    ALKPHOS 69  --    AST 82*  --    ALT 87*  --    ANIONGAP 20* 17*   EGFRNONAA >60 >60     Coagulation:   Recent Labs  Lab 01/05/18  0510   INR 1.2     Lactic Acid:   Recent Labs  Lab 01/05/18  0510   LACTATE 2.0     Troponin:   Recent Labs  Lab 01/05/18  0510 01/05/18  1304   TROPONINI 0.119* 0.067*     TSH:   Recent Labs  Lab 01/05/18  1304   TSH 1.845     Urine Studies:   Recent Labs  Lab 01/05/18  0609   COLORU Brown*   APPEARANCEUA Clear   PHUR 5.0   SPECGRAV 1.025   PROTEINUA Trace*   GLUCUA Negative   KETONESU 1+*   BILIRUBINUA 2+*   OCCULTUA 1+*   NITRITE Negative   UROBILINOGEN 1.0   LEUKOCYTESUR Negative   RBCUA 1   WBCUA 0   BACTERIA None   SQUAMEPITHEL 0   HYALINECASTS 5*       Significant Imaging: CXR: I have reviewed all pertinent results/findings within the past 24 hours and my personal findings are:  No focal infiltrates     CT Head: No acute intracranial CT abnormalities. Symmetric  hypodensities within the bilateral basal ganglia, possibly related to a remote anoxic episode and unchanged when compared to MRI dated 12/06/2017.    Assessment/Plan:     * Non-traumatic rhabdomyolysis    Azotemia  Dehydration with hyponatremia  Metabolic acidosis  Continue IV fluids. Monitor the renal function and CK.          Right leg weakness    Fall  Will get MRI brain and MRA brain/neck to evaluate for stroke. Check B12. Consult PT/OT. Consult Neurology.         Leukocytosis    No UTI or PNA. Likely related to dehydration. Monitor with IV fluids.           Abnormal liver enzymes    Likely related to the rhabdo. Monitor.         Attention deficit hyperactivity disorder (ADHD)    Holding adderall for now.         Essential hypertension    Hold home Toprol XL, lisinopril. Monitor.         Depression    Will resume trazodone and wellbutrin in coming days.           Mixed migraine and muscle contraction headache    Currently has alprazolam, baclofen, percocet, opana ER, tizanadine, and topiramate on his med list in our system. He could not confirm medications at this time. Will attempt to confirm with his pharmacy.           VTE Risk Mitigation         Ordered     enoxaparin injection 40 mg  Daily     Route:  Subcutaneous        01/05/18 1000     High Risk of VTE  Once      01/05/18 1000     Reason for No Pharmacological VTE Prophylaxis  Once      01/05/18 1000     Place sequential compression device  Until discontinued      01/05/18 1000             Manjit Farnsworth MD  Department of Hospital Medicine   [facility]

## 2018-01-06 NOTE — SUBJECTIVE & OBJECTIVE
"Interval History: Says that he feels about "half ass" today. Still feeling weak. Spoke with Susan who is a long-time friend today, and she says that he is very different than what he used to be. She had not seen him in about a year but had spoken with him on the phone a few times over the year. She says that he has a gas central heater at home, but he says that it does not work. He says that he uses his stove for heat and it is electric.     Review of Systems   Constitutional: Negative for chills and fever.   Respiratory: Negative for cough and shortness of breath.    Cardiovascular: Negative for chest pain and palpitations.   Gastrointestinal: Negative for nausea and vomiting.     Objective:     Vital Signs (Most Recent):  Temp: 99.3 °F (37.4 °C) (01/06/18 1612)  Pulse: 86 (01/06/18 1612)  Resp: 18 (01/06/18 1235)  BP: 129/74 (01/06/18 1612)  SpO2: 99 % (01/06/18 1629) Vital Signs (24h Range):  Temp:  [97.5 °F (36.4 °C)-99.3 °F (37.4 °C)] 99.3 °F (37.4 °C)  Pulse:  [] 86  Resp:  [16-20] 18  SpO2:  [96 %-99 %] 99 %  BP: (128-142)/(68-82) 129/74     Weight: 74.9 kg (165 lb 2 oz)  Body mass index is 28.34 kg/m².    Intake/Output Summary (Last 24 hours) at 01/06/18 1742  Last data filed at 01/06/18 0700   Gross per 24 hour   Intake          1904.17 ml   Output             1088 ml   Net           816.17 ml      Physical Exam   Constitutional: He is oriented to person, place, and time. He appears well-developed and well-nourished. No distress.   Abdominal: Soft. He exhibits no distension. There is no tenderness.   Musculoskeletal: He exhibits edema and tenderness (left hand).   Neurological: He is alert and oriented to person, place, and time.   Psychiatric: His affect is blunt. His speech is delayed. He is slowed. Cognition and memory are impaired. He exhibits a depressed mood.   Nursing note and vitals reviewed.      Significant Labs:   A1C:   Recent Labs  Lab 01/05/18  1029   HGBA1C 4.7     CBC:   Recent " Labs  Lab 01/05/18  0510 01/06/18  0424   WBC 18.25* 12.87*   HGB 17.0 12.7*   HCT 47.0 37.0*    289     CMP:   Recent Labs  Lab 01/05/18  0510 01/05/18  1304 01/06/18  0424   * 130* 128*  128*   K 5.0 4.2 3.6  3.6   CL 89* 96 96  96   CO2 18* 17* 22*  22*   GLU 81 83 99  99   BUN 47* 36* 24*  24*   CREATININE 1.1 0.8 0.8  0.8   CALCIUM 8.9 8.3* 7.7*  7.7*   PROT 6.9  --  5.3*   ALBUMIN 3.1*  --  2.2*   BILITOT 1.1*  --  0.7   ALKPHOS 69  --  52*   AST 82*  --  40   ALT 87*  --  50*   ANIONGAP 20* 17* 10  10   EGFRNONAA >60 >60 >60  >60     Lipid Panel:   Recent Labs  Lab 01/05/18  1304   CHOL 138   HDL 41   LDLCALC 76.0   TRIG 105   CHOLHDL 29.7     Magnesium:   Recent Labs  Lab 01/06/18  0424   MG 1.5*     TSH:   Recent Labs  Lab 01/05/18  1304   TSH 1.845       Significant Imaging: I have reviewed all pertinent imaging results/findings within the past 24 hours.

## 2018-01-06 NOTE — ASSESSMENT & PLAN NOTE
Fall  Will get MRI brain and MRA brain/neck to evaluate for stroke. Check B12. Consult PT/OT. Consult Neurology.

## 2018-01-06 NOTE — PLAN OF CARE
Problem: Fall Risk (Adult)  Intervention: Patient Rounds  Pt reminde to call for assisstance

## 2018-01-06 NOTE — ASSESSMENT & PLAN NOTE
Currently has alprazolam, baclofen, percocet, opana ER, tizanadine, and topiramate on his med list in our system. He could not confirm medications at this time. Will attempt to confirm with his pharmacy.

## 2018-01-06 NOTE — PT/OT/SLP PROGRESS
Physical Therapy Treatment    Patient Name:  Spenser Rabago   MRN:  [id number]    Recommendations:     Discharge Recommendations:  rehabilitation facility   Discharge Equipment Recommendations:  (TBD)   Barriers to discharge: Decreased caregiver support    Assessment:     Spenser Rabago is a 57 y.o. male admitted with a medical diagnosis of Non-traumatic rhabdomyolysis.  He presents with the following impairments/functional limitations:  weakness, impaired endurance, impaired sensation, impaired self care skills, impaired functional mobilty, gait instability, impaired balance, impaired cognition, decreased safety awareness, decreased lower extremity function, decreased upper extremity function, impaired coordination, impaired skin, edema pt remains confused with flat affect, weakness and requiring assist with all functional mobility .  Despite co-morbidities including HTN, migranes, depression, patient was modified independent at home prior to this event for locomotion, ADLs, and IADLs including driving and working full time  and there is expectation of returning to prior level of function to maintain independence avoiding readmission. Pt's clinical condition meets full inpatient rehab criteria. The lower level of care (SNF) cannot provide to total interdisciplinary treatment approach needed. Pt is at high risk of unplanned readmission due to fall risk, inability to self-administer medication, and lack of 24 hour caregiver in prior setting.          Rehab Prognosis:  good; patient would benefit from acute skilled PT services to address these deficits and reach maximum level of function.      Recent Surgery: * No surgery found *      Plan:     During this hospitalization, patient to be seen 6 x/week to address the above listed problems via neuromuscular re-education, gait training, therapeutic activities, therapeutic exercises  · Plan of Care Expires:  02/04/18   Plan of Care Reviewed with: patient,  "friend    Subjective     Communicated with nurse prior to session.  Patient found in bed with bed alarm on and friend present upon PT entry to room, agreeable to treatment.      Chief Complaint: tired when sitting EOB  Patient comments/goals: when asked pt what first month of year is pt stated Oct.      Family friend present.  States she talked to sister in law who stated pt has not worked since before Christmas-unsure why.  Brother took pt to MD  2* c/o numbness and tingling in arms and legs-unsure of date/?before or after [place].  Was supposed to get MRI-friend is unsure if done -per chart had  MRI  12/6/17.  Friend also reports pt having blackouts as far back as year ago.  Friend states pt texted her at 2am Fri morning stating he had fallen and was too weak to get up.  Friend called pt on house line as cell phone could not make or receive call.  When pt unable to answer she called 911.  states pt has worked at [employer] in [place] for a "long time".  Pt lives in [place].  Has brother in [place] and 2 brothers in [place]  Pain/Comfort:  · Pain Rating 1: 0/10  · Pain Rating Post-Intervention 1: 0/10    Patients cultural, spiritual, Christian conflicts given the current situation: none    Objective:     Patient found with: peripheral IV     General Precautions: Standard, fall   Orthopedic Precautions:N/A   Braces: N/A     Functional Mobility:  · Bed Mobility:     · Bridging: unable to get pt to bridge in bed despite verrbal and tactile cues  · Supine to Sit: maximal assistance  · Sit to Supine: maximal assistance  · Transfers:     · Sit to Stand:  maximal assistance with side rail and HHA  · Gait: sidesteps to HOB.  required verbal and tactile cues for wt shift and assist with foot to sidestep.       AM-PAC 6 CLICK MOBILITY  Turning over in bed (including adjusting bedclothes, sheets and blankets)?: 2  Sitting down on and standing up from a chair with arms (e.g., wheelchair, bedside commode, etc.): " 2  Moving from lying on back to sitting on the side of the bed?: 2  Moving to and from a bed to a chair (including a wheelchair)?: 2  Need to walk in hospital room?: 1  Climbing 3-5 steps with a railing?: 1  Total Score: 10       Therapeutic Activities and Exercises:   worked on sitting and standing balance.  Reorientation to place and time.,pt incontinent of urine.  Cleaned up and new pads placed-repositioned in supine with HOB elevated.  Friend present and obtained history as noted above.      Patient left HOB elevated with all lines intact, call button in reach, bed alarm on, nurse notified and friend present..    GOALS:    Physical Therapy Goals        Problem: Physical Therapy Goal    Goal Priority Disciplines Outcome Goal Variances Interventions   Physical Therapy Goal     PT/OT, PT Ongoing (interventions implemented as appropriate)     Description:  Goals to be met by: 18     Patient will increase functional independence with mobility by performin. Supine to sit with Stand-by Assistance  2. Sit to supine with Stand-by Assistance  3. Sit to stand transfer with Moderate Assistance  4. Bed to chair transfer with Moderate Assistance using least restirctive AD  5.  Assess gait                       Time Tracking:     PT Received On: 18  PT Start Time: 1315     PT Stop Time: 1403  PT Total Time (min): 48 min     Billable Minutes: Therapeutic Activity 16 and Neuromuscular Re-education 32    Treatment Type: Treatment  PT/PTA: PT           Fabiola Pettit, PT  2018

## 2018-01-06 NOTE — HPI
Spenser Rabago is a 57 y.o. white man with hypertension, migraine headaches, attention deficit hyperactivity disorder, and depression.  He lives in [place], [place].  His primary care physician is Dr. Vladimir Apple.  His neurologist is Dr. Frank Lancaster.   He was taken by EMS to [facility] on 1/5/18 after falling and being on the floor for at least 6 hours.  Four days prior to this, a box fell on his right foot and he had pain and difficulty walking.  He was falling a lot after that.  He also had decreased appetite and oral intake the past week.  He was found to have dehydration (sodium 127, BUN 47, urine with ketones and hyaline casts), high anion gap acidosis (bicarbonate 18, anion gap 20), and rhabdomyolysis (CPK 1759).  He was admitted to [facility].

## 2018-01-06 NOTE — PT/OT/SLP EVAL
Physical Therapy Evaluation and treatment    Patient Name:  Spenser Rabago   MRN:  [id number]    Recommendations:     Discharge Recommendations:  rehabilitation facility   Discharge Equipment Recommendations:  (TBD)   Barriers to discharge: Decreased caregiver support    Assessment:     Spenser Rabago is a 57 y.o. male admitted with a medical diagnosis of Non-traumatic rhabdomyolysis.  He presents with the following impairments/functional limitations:  weakness, impaired endurance, impaired sensation, impaired self care skills, impaired functional mobilty, gait instability, impaired balance, impaired cognition, decreased upper extremity function, decreased lower extremity function, decreased safety awareness, edema, impaired skin, impaired coordination . Pt requiring max a with all functional mobility. Poor balance, Weakness throughout.   Cognitive deficits noted-flat affect, confused , disorientation.  Pt with history of repeated falls per chart and found down at home-on ground overnight .  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  Other acute toxic leukoencephalopathy would have a similar appearance with broad differential considerations for toxic exposure include prescription drugs (antineoplastic, immunosuppressive, antimicrobial), non-prescription drugs (heroin, MDMA, ethanol) and environmental exposures (carbon monoxide, arsenic, carbon tetrachloride). An inflammatory/infectious etiology or other demyelinating process possible but thought less likely.   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 Pt's clinical condition meets full inpatient rehab criteria. The lower level of care (SNF)  "cannot provide to total interdisciplinary treatment approach needed. Pt is at high risk of unplanned readmission due to fall risk, inability to self-administer medication, and lack of 24 hour caregiver in prior setting.      Rehab Prognosis:  good; patient would benefit from acute skilled PT services to address these deficits and reach maximum level of function.      Recent Surgery: * No surgery found *      Plan:     During this hospitalization, patient to be seen 6 x/week to address the above listed problems via gait training, therapeutic activities, therapeutic exercises, neuromuscular re-education  · Plan of Care Expires:  02/04/18   Plan of Care Reviewed with: patient    Subjective     Communicated with nursing prior to session.  Patient found on stretcher in ED upon PT entry to room, agreeable to evaluation.      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  Patient has the following equipment: none.  DME owned (not currently used): none.  Upon discharge, patient will have assistance from -ongoing assessment.  Pt states he has family here in city but can not tell PT who it is. .    Objective:     Patient found with: peripheral IV     General Precautions: Standard, fall   Orthopedic Precautions:N/A   Braces: N/A     Exams:  · Cognitive Exam:  Patient is oriented to Person and year only.  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   follows  Repeated one step commands with visula and tactile cueing commands   · Gross Motor " Coordination:  Impaired throughout  · Sensation:  Impaired to light touch in BLE -lower legs and feet  · Skin Integrity/Edema:      -       Moderate edema RLE,  Min Edema LLE, moderate edema L hand  Minimal hair growth BLE   Multiple bruises, abrasions throughout back and extremities.  unstageable pressure ulcer noted mid back   · RLE ROM: WFL passively although c/o pain with knee flex >90   · LLE ROM: passively WFL.  · BLE Strength: Deficits: inconsistent results from attempted MMT compared to functional mobility .  Initiation of hip flex in supine but not in sitting(1/5).  Knee ext against gravity through partial range in supine and not in sitting (2-/5).  Could not elicit any active ROM at ankle 0/5 for DF    Functional Mobility:  Bed Mobility:    · Patient completed Rolling/Turning to Left with  moderate assistance and maximal assistance  · Patient completed Scooting/Bridging with moderate assistance and maximal assistance  · Patient completed Supine to Sit with maximal assistance  · Patient completed Sit to Supine with maximal assistance     Functional Mobility/Transfers:  · Patient completed Sit <> Stand Transfer with maximal assistance  with  rolling walker   · Patient completed Bed <> Chair Transfer using Step Transfer technique with maximal assistance with rolling walker  · Gait-sidesteps only toward HOB after SPT requiring max a for wt shift and visual, verbal and tactile cueing    AM-PAC 6 CLICK MOBILITY  Total Score:10       Therapeutic Activities and Exercises:   partial Eval with OT. Pt with no c/o dizziness when first sitting EOB or with standing but c/o dizziness when return to sit at EOB after SPT.  Unable to side step to HOB so lateral scooting with mod then max a toward HOB prior to assist back into supine.   Rolling to assist nursing to bandage ulcers/abrasions on back.  Obtained and donned  Z flex boots B as pt is at high risk for heel breakdown as well as ankle flexion contracture.      Patient  left HOB elevated with all lines intact, call button in reach, bed alarm on and nursing present.    GOALS:    Physical Therapy Goals        Problem: Physical Therapy Goal    Goal Priority Disciplines Outcome Goal Variances Interventions   Physical Therapy Goal     PT/OT, PT Ongoing (interventions implemented as appropriate)     Description:  Goals to be met by: 18     Patient will increase functional independence with mobility by performin. Supine to sit with Stand-by Assistance  2. Sit to supine with Stand-by Assistance  3. Sit to stand transfer with Moderate Assistance  4. Bed to chair transfer with Moderate Assistance using least restirctive AD  5.  Assess gait                       History:     Past Medical History:   Diagnosis Date    ADD (attention deficit disorder)     Depression     Headache(784.0)     Hypertension     Migraines        History reviewed. No pertinent surgical history.    Clinical Decision Making:     History  Co-morbidities and personal factors that may impact the plan of care Examination  Body Structures and Functions, activity limitations and participation restrictions that may impact the plan of care Clinical Presentation   Decision Making/ Complexity Score   Co-morbidities:   [] Time since onset of injury / illness / exacerbation  [] Status of current condition  []Patient's cognitive status and safety concerns    [] Multiple Medical Problems (see med hx)  Personal Factors:   [] Patient's age  [] Prior Level of function   [] Patient's home situation (environment and family support)  [] Patient's level of motivation  [] Expected progression of patient      HISTORY:(criteria)    [] 88695 - no personal factors/history    [] 83748 - has 1-2 personal factor/comorbidity     [] 13985 - has >3 personal factor/comorbidity     Body Regions:  [] Objective examination findings  [] Head     []  Neck  [] Trunk   [] Upper Extremity  [] Lower Extremity    Body Systems:  [] For  communication ability, affect, cognition, language, and learning style: the assessment of the ability to make needs known, consciousness, orientation (person, place, and time), expected emotional /behavioral responses, and learning preferences (eg, learning barriers, education  needs)  [] For the neuromuscular system: a general assessment of gross coordinated movement (eg, balance, gait, locomotion, transfers, and transitions) and motor function  (motor control and motor learning)  [] For the musculoskeletal system: the assessment of gross symmetry, gross range of motion, gross strength, height, and weight  [] For the integumentary system: the assessment of pliability(texture), presence of scar formation, skin color, and skin integrity  [] For cardiovascular/pulmonary system: the assessment of heart rate, respiratory rate, blood pressure, and edema     Activity limitations:    [] Patient's cognitive status and saf ety concerns          [] Status of current condition      [] Weight bearing restriction  [] Cardiopulmunary Restriction    Participation Restrictions:   [] Goals and goal agreement with the patient     [] Rehab potential (prognosis) and probable outcome      Examination of Body System: (criteria)    [] 30002 - addressing 1-2 elements    [] 41703 - addressing a total of 3 or more elements     [] 09755 -  Addressing a total of 4 or more elements         Clinical Presentation: (criteria)  Choose one     On examination of body system using standardized tests and measures patient presents with (CHOOSE ONE) elements from any of the following: body structures and functions, activity limitations, and/or participation restrictions.  Leading to a clinical presentation that is considered (CHOOSE ONE)                              Clinical Decision Making  (Eval Complexity):  Choose One     Time Tracking:     PT Received On: 01/05/18  PT Start Time: 1543     PT Stop Time: 1635  PT Total Time (min): 52 min     Billable  Minutes: Evaluation 40 and Therapeutic Activity 12      Fabiola Pettit, PT  01/05/2018

## 2018-01-06 NOTE — PLAN OF CARE
lethargic , flat affect with delayed responses. Confused about time, situation ,does not answer questions apprpriately. Does not remember birthday when asked. Incontinent of urine without realizing it..   Javad here and saw patient.Family member requested to speak to dr Farnsworth ,left phone number on board. Dr Farnsworth aware . Abrasion present under left eye as well as on back post fall at home.

## 2018-01-06 NOTE — PLAN OF CARE
Problem: Physical Therapy Goal  Goal: Physical Therapy Goal  Goals to be met by: 18     Patient will increase functional independence with mobility by performin. Supine to sit with Stand-by Assistance  2. Sit to supine with Stand-by Assistance  3. Sit to stand transfer with Moderate Assistance  4. Bed to chair transfer with Moderate Assistance using least restirctive AD  5.  Assess gait     Outcome: Ongoing (interventions implemented as appropriate)  PT eval.  Pt requiring max a with all functional mobility. Poor balance, Weakness throughout.   Cognitive deficits noted-flat affect, confused , disorientation.  Pt with history of repeated falls per chart and found down at home-on ground overnight  REC:  IPR

## 2018-01-07 LAB
ANION GAP SERPL CALC-SCNC: 7 MMOL/L
BUN SERPL-MCNC: 15 MG/DL
CALCIUM SERPL-MCNC: 7.6 MG/DL
CHLORIDE SERPL-SCNC: 95 MMOL/L
CK SERPL-CCNC: 321 U/L
CO2 SERPL-SCNC: 25 MMOL/L
CREAT SERPL-MCNC: 0.7 MG/DL
ERYTHROCYTE [DISTWIDTH] IN BLOOD BY AUTOMATED COUNT: 12.5 %
EST. GFR  (AFRICAN AMERICAN): >60 ML/MIN/1.73 M^2
EST. GFR  (NON AFRICAN AMERICAN): >60 ML/MIN/1.73 M^2
GLUCOSE SERPL-MCNC: 111 MG/DL
HCT VFR BLD AUTO: 36.1 %
HGB BLD-MCNC: 12.4 G/DL
MAGNESIUM SERPL-MCNC: 1.9 MG/DL
MCH RBC QN AUTO: 29.4 PG
MCHC RBC AUTO-ENTMCNC: 34.3 G/DL
MCV RBC AUTO: 86 FL
PHOSPHATE SERPL-MCNC: 2.6 MG/DL
PLATELET # BLD AUTO: 283 K/UL
PMV BLD AUTO: 9.9 FL
POTASSIUM SERPL-SCNC: 3.4 MMOL/L
RBC # BLD AUTO: 4.22 M/UL
SODIUM SERPL-SCNC: 127 MMOL/L
WBC # BLD AUTO: 11.87 K/UL

## 2018-01-07 PROCEDURE — 82550 ASSAY OF CK (CPK): CPT

## 2018-01-07 PROCEDURE — 80048 BASIC METABOLIC PNL TOTAL CA: CPT

## 2018-01-07 PROCEDURE — A4216 STERILE WATER/SALINE, 10 ML: HCPCS | Performed by: HOSPITALIST

## 2018-01-07 PROCEDURE — 97530 THERAPEUTIC ACTIVITIES: CPT

## 2018-01-07 PROCEDURE — 25000003 PHARM REV CODE 250: Performed by: HOSPITALIST

## 2018-01-07 PROCEDURE — 94761 N-INVAS EAR/PLS OXIMETRY MLT: CPT

## 2018-01-07 PROCEDURE — 36415 COLL VENOUS BLD VENIPUNCTURE: CPT

## 2018-01-07 PROCEDURE — 97116 GAIT TRAINING THERAPY: CPT

## 2018-01-07 PROCEDURE — 83735 ASSAY OF MAGNESIUM: CPT

## 2018-01-07 PROCEDURE — 84100 ASSAY OF PHOSPHORUS: CPT

## 2018-01-07 PROCEDURE — 11000001 HC ACUTE MED/SURG PRIVATE ROOM

## 2018-01-07 PROCEDURE — 63600175 PHARM REV CODE 636 W HCPCS: Performed by: HOSPITALIST

## 2018-01-07 PROCEDURE — 85027 COMPLETE CBC AUTOMATED: CPT

## 2018-01-07 RX ORDER — ACETAMINOPHEN 325 MG/1
650 TABLET ORAL EVERY 6 HOURS PRN
Status: DISCONTINUED | OUTPATIENT
Start: 2018-01-07 | End: 2018-01-18 | Stop reason: HOSPADM

## 2018-01-07 RX ORDER — POTASSIUM CHLORIDE 20 MEQ/1
40 TABLET, EXTENDED RELEASE ORAL ONCE
Status: COMPLETED | OUTPATIENT
Start: 2018-01-07 | End: 2018-01-07

## 2018-01-07 RX ADMIN — POTASSIUM CHLORIDE 40 MEQ: 20 TABLET, EXTENDED RELEASE ORAL at 09:01

## 2018-01-07 RX ADMIN — ATORVASTATIN CALCIUM 40 MG: 40 TABLET, FILM COATED ORAL at 09:01

## 2018-01-07 RX ADMIN — SODIUM CHLORIDE, PRESERVATIVE FREE 3 ML: 5 INJECTION INTRAVENOUS at 01:01

## 2018-01-07 RX ADMIN — SODIUM CHLORIDE, PRESERVATIVE FREE 3 ML: 5 INJECTION INTRAVENOUS at 09:01

## 2018-01-07 RX ADMIN — SODIUM CHLORIDE, PRESERVATIVE FREE 3 ML: 5 INJECTION INTRAVENOUS at 07:01

## 2018-01-07 RX ADMIN — REGULAR STRENGTH 325 MG: 325 TABLET ORAL at 09:01

## 2018-01-07 RX ADMIN — ENOXAPARIN SODIUM 40 MG: 100 INJECTION SUBCUTANEOUS at 06:01

## 2018-01-07 RX ADMIN — POTASSIUM & SODIUM PHOSPHATES POWDER PACK 280-160-250 MG 2 PACKET: 280-160-250 PACK at 09:01

## 2018-01-07 RX ADMIN — SODIUM CHLORIDE, PRESERVATIVE FREE 3 ML: 5 INJECTION INTRAVENOUS at 02:01

## 2018-01-07 RX ADMIN — POTASSIUM & SODIUM PHOSPHATES POWDER PACK 280-160-250 MG 2 PACKET: 280-160-250 PACK at 06:01

## 2018-01-07 RX ADMIN — POTASSIUM & SODIUM PHOSPHATES POWDER PACK 280-160-250 MG 2 PACKET: 280-160-250 PACK at 08:01

## 2018-01-07 RX ADMIN — POTASSIUM & SODIUM PHOSPHATES POWDER PACK 280-160-250 MG 2 PACKET: 280-160-250 PACK at 12:01

## 2018-01-07 RX ADMIN — THERA TABS 1 TABLET: TAB at 09:01

## 2018-01-07 NOTE — ASSESSMENT & PLAN NOTE
Azotemia  Dehydration with hyponatremia  Metabolic acidosis  Continue IV fluids. Monitor the renal function and CK.  CK down to 518.

## 2018-01-07 NOTE — PLAN OF CARE
Problem: Patient Care Overview  Goal: Plan of Care Review  Remains confused. No true red alarms noted on telemetry. NSR noted with HR in the 80s.

## 2018-01-07 NOTE — PLAN OF CARE
Problem: Fall Risk (Adult)  Goal: Absence of Falls  Patient will demonstrate the desired outcomes by discharge/transition of care.   Outcome: Ongoing (interventions implemented as appropriate)  Bed alarm on and bed in lowest position. Call bell in reach. Instructed to call for assistance before attempting to get out of bed. Verbalized understanding.will continue to monitor.

## 2018-01-07 NOTE — ASSESSMENT & PLAN NOTE
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 Appreciate PT/OT. Appreciate Neurology. Will talk with [facility] on Monday. Trying to get family to check house.

## 2018-01-07 NOTE — SUBJECTIVE & OBJECTIVE
Interval History: Feeling better today. Still weak. Took a few steps with PT today.     Review of Systems   Constitutional: Negative for chills and fever.   Respiratory: Negative for cough and shortness of breath.    Cardiovascular: Negative for chest pain and palpitations.   Gastrointestinal: Negative for nausea and vomiting.     Objective:     Vital Signs (Most Recent):  Temp: 98.4 °F (36.9 °C) (01/07/18 1322)  Pulse: 81 (01/07/18 1322)  Resp: 14 (01/07/18 1322)  BP: 132/77 (01/07/18 1322)  SpO2: 98 % (01/07/18 1300) Vital Signs (24h Range):  Temp:  [98.4 °F (36.9 °C)-99.6 °F (37.6 °C)] 98.4 °F (36.9 °C)  Pulse:  [72-97] 81  Resp:  [14-18] 14  SpO2:  [97 %-99 %] 98 %  BP: (125-138)/(74-83) 132/77     Weight: 74.9 kg (165 lb 2 oz)  Body mass index is 28.34 kg/m².    Intake/Output Summary (Last 24 hours) at 01/07/18 1414  Last data filed at 01/07/18 1000   Gross per 24 hour   Intake              496 ml   Output             1165 ml   Net             -669 ml      Physical Exam   Constitutional: He is oriented to person, place, and time. He appears well-developed and well-nourished. No distress.   Abdominal: Soft. He exhibits no distension. There is no tenderness.   Musculoskeletal: He exhibits edema and tenderness (left hand).   Neurological: He is alert and oriented to person, place, and time.   Psychiatric: His affect is blunt. He is slowed. He exhibits a depressed mood.   Nursing note and vitals reviewed.      Significant Labs:   CBC:   Recent Labs  Lab 01/06/18 0424 01/07/18 0622   WBC 12.87* 11.87   HGB 12.7* 12.4*   HCT 37.0* 36.1*    283     CMP:   Recent Labs  Lab 01/06/18  0424 01/07/18  0622   *  128* 127*   K 3.6  3.6 3.4*   CL 96  96 95   CO2 22*  22* 25   GLU 99  99 111*   BUN 24*  24* 15   CREATININE 0.8  0.8 0.7   CALCIUM 7.7*  7.7* 7.6*   PROT 5.3*  --    ALBUMIN 2.2*  --    BILITOT 0.7  --    ALKPHOS 52*  --    AST 40  --    ALT 50*  --    ANIONGAP 10  10 7*   EGFRNONAA >60   >60 >60     Magnesium:   Recent Labs  Lab 01/06/18  0424 01/07/18  0622   MG 1.5* 1.9       Significant Imaging: I have reviewed all pertinent imaging results/findings within the past 24 hours.

## 2018-01-07 NOTE — PLAN OF CARE
Principal Problem:Non-traumatic rhabdomyolysis     Chief Complaint:        Chief Complaint   Patient presents with    Fall       pt. called 911 s/p fall. pt states he has been on floor unable to get up since approx. 830pm last night. pt. also states he fell tuesday and has rt. foot pain/swelling. pt. has scab to lt. cheek, abrasion with redness/swelling to rt. hand.      Pt independent with ADLs, No HH/HME, live alone, family can provide transportation on discharge       01/07/18 0818   Discharge Assessment   Assessment Type Discharge Planning Assessment   Confirmed/corrected address and phone number on facesheet? Yes   Assessment information obtained from? Patient   Expected Length of Stay (days) 3   Communicated expected length of stay with patient/caregiver yes   Prior to hospitilization cognitive status: Alert/Oriented   Prior to hospitalization functional status: Independent   Current cognitive status: Alert/Oriented   Current Functional Status: Independent   Facility Arrived From: (home)   Lives With alone   Able to Return to Prior Arrangements yes   Is patient able to care for self after discharge? Yes   Who are your caregiver(s) and their phone number(s)? Brother:  Mando Rabago [phone]   Patient's perception of discharge disposition home or selfcare   Readmission Within The Last 30 Days no previous admission in last 30 days   Patient currently being followed by outpatient case management? No   Patient currently receives any other outside agency services? No   Equipment Currently Used at Home none   Do you have any problems affording any of your prescribed medications? No   Is the patient taking medications as prescribed? yes   Does the patient have transportation home? Yes   Transportation Available family or friend will provide   Dialysis Name and Scheduled days N/A   Does the patient receive services at the [facility]? No   Discharge Plan A Home   Discharge Plan B Home with family    Patient/Family In Agreement With Plan yes     Luzma Palma, RN Transitional Navigator  [phone]

## 2018-01-07 NOTE — PROGRESS NOTES
"[facility] Hospital Medicine  Progress Note    Patient Name: Spenser Rabago  MRN: [id number]  Patient Class: IP- Inpatient   Admission Date: 1/5/2018  Length of Stay: 2 days  Attending Physician: Manjit Farnsworth MD  Primary Care Provider: Vladimir Apple MD        Subjective:     Principal Problem:Non-traumatic rhabdomyolysis    HPI:  Mr. Rabago is a 56 yo WM with chronic headaches/migraines, ADD, and HTN that presented to [facility] after calling EMS because he fell and could not get up. He reports being on the floor at least 6 hours prior to EMS arrival. He says that 4 days PTA a box fell on his right foot and he has had pain and difficulty walking since that time. He reports falling yesterday and having pain in his left hand as well. He says that he has been falling a lot over the last few days. He also reports having decreased appetite and oral intake over the last week. He denies any nausea or vomiting.     Hospital Course:  No notes on file    Interval History: Says that he feels about "half ass" today. Still feeling weak. Spoke with Susan who is a long-time friend today, and she says that he is very different than what he used to be. She had not seen him in about a year but had spoken with him on the phone a few times over the year. She says that he has a gas central heater at home, but he says that it does not work. He says that he uses his stove for heat and it is electric.     Review of Systems   Constitutional: Negative for chills and fever.   Respiratory: Negative for cough and shortness of breath.    Cardiovascular: Negative for chest pain and palpitations.   Gastrointestinal: Negative for nausea and vomiting.     Objective:     Vital Signs (Most Recent):  Temp: 99.3 °F (37.4 °C) (01/06/18 1612)  Pulse: 86 (01/06/18 1612)  Resp: 18 (01/06/18 1235)  BP: 129/74 (01/06/18 1612)  SpO2: 99 % (01/06/18 1629) Vital Signs (24h Range):  Temp:  [97.5 °F (36.4 °C)-99.3 °F (37.4 °C)] 99.3 °F (37.4 " °C)  Pulse:  [] 86  Resp:  [16-20] 18  SpO2:  [96 %-99 %] 99 %  BP: (128-142)/(68-82) 129/74     Weight: 74.9 kg (165 lb 2 oz)  Body mass index is 28.34 kg/m².    Intake/Output Summary (Last 24 hours) at 01/06/18 1742  Last data filed at 01/06/18 0700   Gross per 24 hour   Intake          1904.17 ml   Output             1088 ml   Net           816.17 ml      Physical Exam   Constitutional: He is oriented to person, place, and time. He appears well-developed and well-nourished. No distress.   Abdominal: Soft. He exhibits no distension. There is no tenderness.   Musculoskeletal: He exhibits edema and tenderness (left hand).   Neurological: He is alert and oriented to person, place, and time.   Psychiatric: His affect is blunt. His speech is delayed. He is slowed. Cognition and memory are impaired. He exhibits a depressed mood.   Nursing note and vitals reviewed.      Significant Labs:   A1C:   Recent Labs  Lab 01/05/18  1029   HGBA1C 4.7     CBC:   Recent Labs  Lab 01/05/18  0510 01/06/18  0424   WBC 18.25* 12.87*   HGB 17.0 12.7*   HCT 47.0 37.0*    289     CMP:   Recent Labs  Lab 01/05/18  0510 01/05/18  1304 01/06/18  0424   * 130* 128*  128*   K 5.0 4.2 3.6  3.6   CL 89* 96 96  96   CO2 18* 17* 22*  22*   GLU 81 83 99  99   BUN 47* 36* 24*  24*   CREATININE 1.1 0.8 0.8  0.8   CALCIUM 8.9 8.3* 7.7*  7.7*   PROT 6.9  --  5.3*   ALBUMIN 3.1*  --  2.2*   BILITOT 1.1*  --  0.7   ALKPHOS 69  --  52*   AST 82*  --  40   ALT 87*  --  50*   ANIONGAP 20* 17* 10  10   EGFRNONAA >60 >60 >60  >60     Lipid Panel:   Recent Labs  Lab 01/05/18  1304   CHOL 138   HDL 41   LDLCALC 76.0   TRIG 105   CHOLHDL 29.7     Magnesium:   Recent Labs  Lab 01/06/18  0424   MG 1.5*     TSH:   Recent Labs  Lab 01/05/18  1304   TSH 1.845       Significant Imaging: I have reviewed all pertinent imaging results/findings within the past 24 hours.    Assessment/Plan:      * Non-traumatic rhabdomyolysis     Azotemia  Dehydration with hyponatremia  Metabolic acidosis  Continue IV fluids. Monitor the renal function and CK.  CK down to 518.         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 Appreciate PT/OT. Appreciate Neurology. Will talk with Hyperbariatics on Monday. Trying to get family to check house.         Leukocytosis    No UTI or PNA. Likely related to dehydration. Monitor with IV fluids.           Abnormal liver enzymes    Likely related to the rhabdo. Monitor. Improving.         Attention deficit hyperactivity disorder (ADHD)    Holding adderall for now.         Essential hypertension    Hold home Toprol XL, lisinopril. Monitor. SBP ranged 125 to 164.         Depression    Will resume trazodone and wellbutrin in coming days.           Mixed migraine and muscle contraction headache    Currently has alprazolam, baclofen, percocet, opana ER, tizanadine, and topiramate on his med list in our system. He could not confirm medications at this time. Will attempt to confirm with his pharmacy.           VTE Risk Mitigation         Ordered     enoxaparin injection 40 mg  Daily     Route:  Subcutaneous        01/05/18 1000     High Risk of VTE  Once      01/05/18 1000     Reason for No Pharmacological VTE Prophylaxis  Once      01/05/18 1000     Place sequential compression device  Until discontinued      01/05/18 1000              Manjit Farnsworth MD  Department of Hospital Medicine   [facility]

## 2018-01-07 NOTE — PLAN OF CARE
Problem: Physical Therapy Goal  Goal: Physical Therapy Goal  Goals to be met by: 18     Patient will increase functional independence with mobility by performin. Supine to sit with Stand-by Assistance  2. Sit to supine with Stand-by Assistance MET 2018  3. Sit to stand transfer with Moderate Assistance MET 2018  4. Bed to chair transfer with Moderate Assistance using least restirctive AD MET 2018  5.  Assess gait     Updated Goals:  1. Supine<>sit with SBA  2. Sit to stand to SBA with RW  3. Bed to chair transfers with RW and CGA  4. Gait x 20 ft with RW and CGA     Outcome: Ongoing (interventions implemented as appropriate)  Pt met 4/5 goals previously set on evaluation as pt with significantly improved activity tolerance. Pt ambulated 5 ft with RW and CGA/min A. Pt will benefit from IPR upon d/c.

## 2018-01-07 NOTE — PLAN OF CARE
Continued on telemetry and fall risk monitoring ACE inhibitor therapy was not prescribed due to True red alarm ectopy ,bed alarm on. Awake ,alert with delayed responses ,not sure of dates or his b day date. Ambulated and sat in chair with PT. Tolerated it fair,tred easily. Lrft hand still swollen and b ruised in palm of hand. ,epilex in place on back covering the abrasions . Wound under left eye scabbed over. Z boots on,heels elevated .

## 2018-01-07 NOTE — PLAN OF CARE
Much more alert and appropriate. Able to answer questions asked . Eating dinner.On telemetry and fall risk monitoring. No true red al;arm ectopy.bed alar on.m both hands edematous. Instructed to keep elevated as much as possible.

## 2018-01-07 NOTE — PT/OT/SLP PROGRESS
Physical Therapy Treatment    Patient Name:  Spenser Rabago   MRN:  [id number]    Recommendations:     Discharge Recommendations:  rehabilitation facility   Discharge Equipment Recommendations:  (defer to IPR, at this time pt requires RW)   Barriers to discharge: Inaccessible home and Decreased caregiver support    Assessment:     Spenser Rabago is a 57 y.o. male admitted with a medical diagnosis of Non-traumatic rhabdomyolysis.  He presents with the following impairments/functional limitations:  weakness, impaired endurance, impaired self care skills, impaired functional mobilty, gait instability, impaired balance, impaired cognition, decreased lower extremity function, decreased upper extremity function, edema, decreased ROM.  Pt with improved activity tolerance today, able to ambulate 5 ft with RW and CGA/min A 2x, pt with R foot drag as he has no AROM of L ankle and limited movement at his L knee/hip. Pt is motivated and participates well in therapy session and will benefit from IPR upon d/c to address pt's functional limitations as pt was independent PTA.    Rehab Prognosis:  Good; patient would benefit from acute skilled PT services to address these deficits and reach maximum level of function.      Recent Surgery: * No surgery found *      Plan:     During this hospitalization, patient to be seen 6 x/week to address the above listed problems via gait training, therapeutic activities, therapeutic exercises, neuromuscular re-education  · Plan of Care Expires:  02/04/18   Plan of Care Reviewed with: patient    Subjective     Communicated with RN Vicky prior to session.  Patient found supine with HOB elevated upon PT entry to room, agreeable to treatment.      Chief Complaint: when PT passively moving R ankle for ROM, pt with c/o mild R foot pain (activity discontinued)  Patient comments/goals: pt is pleasant and participates well in therapy as he is very motivated. Pt with flat affect and delayed  processing.  Pain/Comfort:  Pain Rating 1:  (does not initially reports pain but later reports pain in R foot (does not rate))  Location - Side 1: Right  Location - Orientation 1: generalized  Location 1: foot  Pain Addressed 1: Reposition, Distraction  Pain Rating Post-Intervention 1: 0/10    Patients cultural, spiritual, [employer]al conflicts given the current situation: none    Objective:     Patient found with: bed alarm, pressure relief boots     General Precautions: Standard, fall   Orthopedic Precautions:N/A   Braces: N/A     Functional Mobility:  · Bed Mobility:     · Scooting: stand by assistance to scoot forward in sitting with increased time and cues  · Supine to Sit: moderate assistance for BLEs as pt assists with raising trunk  · Sit to Supine: stand by assistance then required assist to scoot LEs once in supine  · Transfers:     · Sit to Stand: min A for first stand from EOB with RW and then CGA for remaining 3 stands with RW from EOB/chair  · Bed to Chair: contact guard assistance and minimum assistance with rolling walker using Step Transfer  · Gait: 5 ft x 2 with RW and CGA/min A with R foot drag due to pt unable to actively move R ankle and with very limited R hip/knee flexion. VCs for RW management.    AM-PAC 6 CLICK MOBILITY  Turning over in bed (including adjusting bedclothes, sheets and blankets)?: 3  Sitting down on and standing up from a chair with arms (e.g., wheelchair, bedside commode, etc.): 3  Moving from lying on back to sitting on the side of the bed?: 3  Moving to and from a bed to a chair (including a wheelchair)?: 3  Need to walk in hospital room?: 1  Climbing 3-5 steps with a railing?: 1  Total Score: 14     Therapeutic Activities and Exercises:  Pt instructed in gait to sit in chair and gait distance limited by pt's fatigue. Once sitting in chair, pt instructed in 5 reps of L APs and BLE LAQs (LLE through 1/2 ROM and RLE through ~1/4 ROM).  Gait training as reported above and pt  requested to return to bed following session and completed sit>supine with SBA then required assist to scoot LEs for proper positioning in bed.    Patient left HOB elevated with pressure relief boots donned with all lines intact, call button in reach, bed alarm on and RN notified..    GOALS:    Physical Therapy Goals        Problem: Physical Therapy Goal    Goal Priority Disciplines Outcome Goal Variances Interventions   Physical Therapy Goal     PT/OT, PT Ongoing (interventions implemented as appropriate)     Description:  Goals to be met by: 18     Patient will increase functional independence with mobility by performin. Supine to sit with Stand-by Assistance  2. Sit to supine with Stand-by Assistance MET 2018  3. Sit to stand transfer with Moderate Assistance MET 2018  4. Bed to chair transfer with Moderate Assistance using least restirctive AD MET 2018  5.  Assess gait     Updated Goals:  1. Supine<>sit with SBA  2. Sit to stand to SBA with RW  3. Bed to chair transfers with RW and CGA  4. Gait x 20 ft with RW and CGA                       Time Tracking:     PT Received On: 18  PT Start Time: 916     PT Stop Time: 946  PT Total Time (min): 30 min     Billable Minutes: Gait Training 10 and Therapeutic Activity 20    Treatment Type: Treatment  PT/PTA: PT     PTA Visit Number: 0     Gracy Faith, PT  2018

## 2018-01-08 PROBLEM — D72.829 LEUKOCYTOSIS: Status: RESOLVED | Noted: 2018-01-05 | Resolved: 2018-01-08

## 2018-01-08 LAB
ANION GAP SERPL CALC-SCNC: 8 MMOL/L
BUN SERPL-MCNC: 13 MG/DL
CALCIUM SERPL-MCNC: 8.1 MG/DL
CHLORIDE SERPL-SCNC: 97 MMOL/L
CK SERPL-CCNC: 227 U/L
CO2 SERPL-SCNC: 23 MMOL/L
CREAT SERPL-MCNC: 0.7 MG/DL
EST. GFR  (AFRICAN AMERICAN): >60 ML/MIN/1.73 M^2
EST. GFR  (NON AFRICAN AMERICAN): >60 ML/MIN/1.73 M^2
GLUCOSE SERPL-MCNC: 103 MG/DL
MAGNESIUM SERPL-MCNC: 1.7 MG/DL
PHOSPHATE SERPL-MCNC: 3.4 MG/DL
POTASSIUM SERPL-SCNC: 4.3 MMOL/L
SODIUM SERPL-SCNC: 128 MMOL/L

## 2018-01-08 PROCEDURE — 94761 N-INVAS EAR/PLS OXIMETRY MLT: CPT

## 2018-01-08 PROCEDURE — 84100 ASSAY OF PHOSPHORUS: CPT

## 2018-01-08 PROCEDURE — 82550 ASSAY OF CK (CPK): CPT

## 2018-01-08 PROCEDURE — 36415 COLL VENOUS BLD VENIPUNCTURE: CPT

## 2018-01-08 PROCEDURE — 83735 ASSAY OF MAGNESIUM: CPT

## 2018-01-08 PROCEDURE — 11000001 HC ACUTE MED/SURG PRIVATE ROOM

## 2018-01-08 PROCEDURE — 80048 BASIC METABOLIC PNL TOTAL CA: CPT

## 2018-01-08 PROCEDURE — 63600175 PHARM REV CODE 636 W HCPCS: Performed by: HOSPITALIST

## 2018-01-08 PROCEDURE — 97116 GAIT TRAINING THERAPY: CPT

## 2018-01-08 PROCEDURE — 97530 THERAPEUTIC ACTIVITIES: CPT

## 2018-01-08 PROCEDURE — 25000003 PHARM REV CODE 250: Performed by: HOSPITALIST

## 2018-01-08 PROCEDURE — A4216 STERILE WATER/SALINE, 10 ML: HCPCS | Performed by: HOSPITALIST

## 2018-01-08 PROCEDURE — 97535 SELF CARE MNGMENT TRAINING: CPT

## 2018-01-08 RX ORDER — ASPIRIN 81 MG/1
81 TABLET ORAL DAILY
Status: DISCONTINUED | OUTPATIENT
Start: 2018-01-08 | End: 2018-01-18 | Stop reason: HOSPADM

## 2018-01-08 RX ADMIN — SODIUM CHLORIDE TAB 1 GM 1 G: 1 TAB at 09:01

## 2018-01-08 RX ADMIN — SODIUM CHLORIDE, PRESERVATIVE FREE 3 ML: 5 INJECTION INTRAVENOUS at 06:01

## 2018-01-08 RX ADMIN — SODIUM CHLORIDE TAB 1 GM 1 G: 1 TAB at 10:01

## 2018-01-08 RX ADMIN — SODIUM CHLORIDE, PRESERVATIVE FREE 3 ML: 5 INJECTION INTRAVENOUS at 05:01

## 2018-01-08 RX ADMIN — ENOXAPARIN SODIUM 40 MG: 100 INJECTION SUBCUTANEOUS at 05:01

## 2018-01-08 RX ADMIN — ASPIRIN 81 MG: 81 TABLET, COATED ORAL at 09:01

## 2018-01-08 RX ADMIN — THERA TABS 1 TABLET: TAB at 09:01

## 2018-01-08 RX ADMIN — SODIUM CHLORIDE, PRESERVATIVE FREE 3 ML: 5 INJECTION INTRAVENOUS at 10:01

## 2018-01-08 NOTE — ASSESSMENT & PLAN NOTE
Azotemia  Dehydration with hyponatremia  Metabolic acidosis  Stop IV fluids. Monitor the renal function and CK.  CK down to 300s.

## 2018-01-08 NOTE — PLAN OF CARE
Problem: Occupational Therapy Goal  Goal: Occupational Therapy Goal  Goals to be met by: 2/5     Patient will increase functional independence with ADLs by performing:    UE Dressing with Set-up Assistance.  Grooming while standing at sink with Moderate Assistance.  Toileting from toilet with Moderate Assistance for hygiene and clothing management.   Supine to sit with Stand-by Assistance.  Toilet transfer to toilet with Moderate Assistance.  Increased functional strength to 4/5 for BUE.     Outcome: Ongoing (interventions implemented as appropriate)  Patient with flat, depressed affect. Increased time and max VCs to initiate movements and tasks. Patient oriented to self and year only. Will benefit from IPR to address functional deficits and improve performance in ADL tasks.

## 2018-01-08 NOTE — PLAN OF CARE
TN went to meet with patient, no family at bedside. TN called patient's brother, Mando, listed on facesheet. No response, unable to leave message. TN spoke with Dr. Farnsworth regarding therapy recommendations. Therapy recommending IPR at this time, however, will see discharge needs and monitor for any improvements for patient to determine discharge destination. TN notified the SW as well. TN will reassess in am.    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 TBD   Discharge Plan A Other  (to be determine, pending discharge needs)   Discharge Plan B Rehab   Patient choice form signed by patient/caregiver N/A   Describe the patient's ability to walk at the present time. Walks with the help of equipment     Maureen Haney RN  Transition Navigator  [phone]

## 2018-01-08 NOTE — PLAN OF CARE
Problem: Patient Care Overview  Goal: Plan of Care Review  Plan of care reviewed. Medication given as ordered. Confusion noted. Assisted with urinal during shift. No c/o pain no true red alarm noted on telemetry .NSR noted with HR in the 70s -80s. Will continue to monitor.

## 2018-01-08 NOTE — PLAN OF CARE
Problem: Physical Therapy Goal  Goal: Physical Therapy Goal  Goals to be met by: 18     Patient will increase functional independence with mobility by performin. Supine to sit with Stand-by Assistance  2. Sit to supine with Stand-by Assistance MET 2018  3. Sit to stand transfer with Moderate Assistance MET 2018  4. Bed to chair transfer with Moderate Assistance using least restirctive AD MET 2018  5.  Assess gait     Updated Goals:  1. Supine<>sit with SBA  2. Sit to stand to SBA with RW  3. Bed to chair transfers with RW and CGA  4. Gait x 20 ft with RW and CGA      Outcome: Ongoing (interventions implemented as appropriate)  Pt participated well with therapy, PT to continue to follow.

## 2018-01-08 NOTE — PROGRESS NOTES
[facility] Hospital Medicine  Progress Note    Patient Name: Spenser Rabago  MRN: [id number]  Patient Class: IP- Inpatient   Admission Date: 1/5/2018  Length of Stay: 3 days  Attending Physician: Manjit Farnsworth MD  Primary Care Provider: Vladimir Apple MD        Subjective:     Principal Problem:Non-traumatic rhabdomyolysis    HPI:  Mr. Rabago is a 58 yo WM with chronic headaches/migraines, ADD, and HTN that presented to [facility] after calling EMS because he fell and could not get up. He reports being on the floor at least 6 hours prior to EMS arrival. He says that 4 days PTA a box fell on his right foot and he has had pain and difficulty walking since that time. He reports falling yesterday and having pain in his left hand as well. He says that he has been falling a lot over the last few days. He also reports having decreased appetite and oral intake over the last week. He denies any nausea or vomiting.     Hospital Course:  No notes on file    Interval History: Feeling better today. Still weak. Took a few steps with PT today.     Review of Systems   Constitutional: Negative for chills and fever.   Respiratory: Negative for cough and shortness of breath.    Cardiovascular: Negative for chest pain and palpitations.   Gastrointestinal: Negative for nausea and vomiting.     Objective:     Vital Signs (Most Recent):  Temp: 98.4 °F (36.9 °C) (01/07/18 1322)  Pulse: 81 (01/07/18 1322)  Resp: 14 (01/07/18 1322)  BP: 132/77 (01/07/18 1322)  SpO2: 98 % (01/07/18 1300) Vital Signs (24h Range):  Temp:  [98.4 °F (36.9 °C)-99.6 °F (37.6 °C)] 98.4 °F (36.9 °C)  Pulse:  [72-97] 81  Resp:  [14-18] 14  SpO2:  [97 %-99 %] 98 %  BP: (125-138)/(74-83) 132/77     Weight: 74.9 kg (165 lb 2 oz)  Body mass index is 28.34 kg/m².    Intake/Output Summary (Last 24 hours) at 01/07/18 1414  Last data filed at 01/07/18 1000   Gross per 24 hour   Intake              496 ml   Output             1165 ml   Net             -669  ml      Physical Exam   Constitutional: He is oriented to person, place, and time. He appears well-developed and well-nourished. No distress.   Abdominal: Soft. He exhibits no distension. There is no tenderness.   Musculoskeletal: He exhibits edema and tenderness (left hand).   Neurological: He is alert and oriented to person, place, and time.   Psychiatric: His affect is blunt. He is slowed. He exhibits a depressed mood.   Nursing note and vitals reviewed.      Significant Labs:   CBC:   Recent Labs  Lab 01/06/18 0424 01/07/18 0622   WBC 12.87* 11.87   HGB 12.7* 12.4*   HCT 37.0* 36.1*    283     CMP:   Recent Labs  Lab 01/06/18 0424 01/07/18 0622   *  128* 127*   K 3.6  3.6 3.4*   CL 96  96 95   CO2 22*  22* 25   GLU 99  99 111*   BUN 24*  24* 15   CREATININE 0.8  0.8 0.7   CALCIUM 7.7*  7.7* 7.6*   PROT 5.3*  --    ALBUMIN 2.2*  --    BILITOT 0.7  --    ALKPHOS 52*  --    AST 40  --    ALT 50*  --    ANIONGAP 10  10 7*   EGFRNONAA >60  >60 >60     Magnesium:   Recent Labs  Lab 01/06/18 0424 01/07/18 0622   MG 1.5* 1.9       Significant Imaging: I have reviewed all pertinent imaging results/findings within the past 24 hours.    Assessment/Plan:      * Non-traumatic rhabdomyolysis    Azotemia  Dehydration with hyponatremia  Metabolic acidosis  Stop IV fluids. Monitor the renal function and CK.  CK down to 300s.         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 Appreciate PT/OT. Appreciate Neurology. Will talk with Hyperbariatics on Monday. Trying to get family to check house.         Abnormal liver enzymes    Likely related to the rhabdo. Monitor. Improving.         Attention deficit hyperactivity disorder (ADHD)    Holding adderall for now.         Essential hypertension    Hold home Toprol XL, lisinopril. Monitor. SBP ranged 125 to 138.         Depression    Will resume trazodone and wellbutrin in coming days.            Mixed migraine and muscle contraction headache    Currently has alprazolam, baclofen, percocet, opana ER, tizanadine, and topiramate on his med list in our system. He could not confirm medications at this time. Will attempt to confirm with his pharmacy.           VTE Risk Mitigation         Ordered     enoxaparin injection 40 mg  Daily     Route:  Subcutaneous        01/05/18 1000     High Risk of VTE  Once      01/05/18 1000     Reason for No Pharmacological VTE Prophylaxis  Once      01/05/18 1000     Place sequential compression device  Until discontinued      01/05/18 1000              Manjit Farnsworth MD  Department of Hospital Medicine   [facility]

## 2018-01-08 NOTE — PT/OT/SLP PROGRESS
Occupational Therapy   Treatment    Name: Spenser Rabago  MRN: [id number]  Admitting Diagnosis:  Non-traumatic rhabdomyolysis       Recommendations:     Discharge Recommendations: rehabilitation facility  Discharge Equipment Recommendations:   (Defer to IPR)  Barriers to discharge:  Inaccessible home environment    Subjective     Communicated with: nursePily prior to session.  Pain/Comfort:  · Pain Rating 1: 8/10 (R hip and B tops of feet)  · Pain Addressed 1: Reposition, Cessation of Activity, Nurse notified  · Pain Rating Post-Intervention 1:  (did not rate)    Objective:     Patient found with: bed alarm, pressure relief boots, telemetry, peripheral IV    General Precautions: Standard, fall   Orthopedic Precautions:N/A   Braces: N/A     Bed Mobility:    · Patient completed Rolling/Turning to Right with minimum assistance and with side rail  · Patient completed Scooting/Bridging with contact guard assistance  · Patient completed Supine to Sit with moderate assistance  · Patient completed Sit to Supine with minimum assistance and with leg lift     Functional Mobility/Transfers:  · Patient completed Sit <> Stand Transfer with minimum assistance  with  rolling walker     Activities of Daily Living:  · Grooming: contact guard assistance to complete EOB  · Toileting: total assistance incontinent of urine while EOB    Patient left HOB elevated with all lines intact, call button in reach, bed alarm on and RN notified    [facility] 6 Click:  [facility] Total Score: 13    Treatment & Education:  Patient with flat affect on arrival, but agreeable to therapy. Patient required increased time and cues to get EOB (~10 mins). Patient with poor attention to task, asking random questions. Oriented to self and year only. Patient completed G/H tasks with CGA and max VCs for initiation and thoroughness. Patient completed hand pumps on R hand x 10 reps - increased edema, but patient reported no pain. Patient stated he had to use the  bathroom, but was incontinent of urine while seated EOB and patient was unaware he was urinating. Stood with Min (A) using RW and able to perform perianal hygiene with CGA while standing. Patient with shuffling steps to HOB, unable to  R foot. Patient requires increased VCs to initiate tasks, transition to next task, and for completion of tasks.   Education:    Assessment:   Patient with flat, depressed affect. Increased time and max VCs to initiate movements and tasks. Patient oriented to self and year only. Will benefit from IPR to address functional deficits and improve performance in ADL tasks.    Spenser Rabago is a 57 y.o. male with a medical diagnosis of Non-traumatic rhabdomyolysis. Performance deficits affecting function are weakness, impaired endurance, impaired self care skills, impaired functional mobilty, gait instability, impaired balance, impaired cognition, decreased safety awareness, pain, impaired skin, edema, decreased coordination, decreased lower extremity function, decreased upper extremity function, decreased ROM.      Rehab Prognosis:  Good; patient would benefit from acute skilled OT services to address these deficits and reach maximum level of function.       Plan:     Patient to be seen 5 x/week to address the above listed problems via self-care/home management, therapeutic activities, therapeutic exercises  · Plan of Care Expires: 02/05/18  · Plan of Care Reviewed with: patient    This Plan of care has been discussed with the patient who was involved in its development and understands and is in agreement with the identified goals and treatment plan    GOALS:    Occupational Therapy Goals        Problem: Occupational Therapy Goal    Goal Priority Disciplines Outcome Interventions   Occupational Therapy Goal     OT, PT/OT Ongoing (interventions implemented as appropriate)    Description:  Goals to be met by: 2/5     Patient will increase functional independence with ADLs by  performing:    UE Dressing with Set-up Assistance.  Grooming while standing at sink with Moderate Assistance.  Toileting from toilet with Moderate Assistance for hygiene and clothing management.   Supine to sit with Stand-by Assistance.  Toilet transfer to toilet with Moderate Assistance.  Increased functional strength to 4/5 for BUE.                      Time Tracking:     OT Date of Treatment: 01/08/18  OT Start Time: 0950  OT Stop Time: 1044  OT Total Time (min): 54 min    Billable Minutes:Self Care/Home Management 38  Therapeutic Activity 16    TING Garay  1/8/2018

## 2018-01-08 NOTE — PT/OT/SLP PROGRESS
Physical Therapy Treatment    Patient Name:  Spenser Rabago   MRN:  [id number]    Recommendations:     Discharge Recommendations:  rehabilitation facility   Discharge Equipment Recommendations:  (defer to IPR)   Barriers to discharge: Inaccessible home and Decreased caregiver support    Assessment:     Spenser Rabago is a 57 y.o. male admitted with a medical diagnosis of Non-traumatic rhabdomyolysis.  He presents with the following impairments/functional limitations:  weakness, impaired endurance, impaired sensation, impaired self care skills, impaired balance, gait instability, impaired functional mobilty, impaired cognition, decreased coordination, decreased upper extremity function, decreased lower extremity function, decreased ROM, pain, decreased safety awareness, impaired coordination, impaired muscle length, impaired joint extensibility. Pt continues to have no active ROM of R.     Rehab Prognosis:  good; patient would benefit from acute skilled PT services to address these deficits and reach maximum level of function.      Recent Surgery: * No surgery found *      Plan:     During this hospitalization, patient to be seen 6 x/week to address the above listed problems via gait training, therapeutic activities, therapeutic exercises, neuromuscular re-education  · Plan of Care Expires:  02/04/18   Plan of Care Reviewed with: patient    Subjective     Communicated with TERI Guerrero prior to session.  Patient found resting supine upon PT entry to room, agreeable to treatment.      Chief Complaint: pain   Patient comments/goals: improve functional mobility.   Pain/Comfort:  · Pain Rating 1:  (Pt unable to rate pain, reproted pain on B anterior LE. )  · Pain Addressed 1: Distraction, Reposition, Other (see comments) (B hot packs applied )  · Pain Rating Post-Intervention 1:  (pt did not rate pain )    Patients cultural, spiritual, Church conflicts given the current situation: None  verbalized to  PT    Objective:     Patient found with: bed alarm, pressure relief boots     General Precautions: Standard, fall   Orthopedic Precautions:N/A   Braces: N/A     Functional Mobility:  · Bed Mobility:     · Rolling Right: stand by assistance and bed rail  · Scooting: stand by assistance and increased time  · Supine to Sit: minimum assistance  · Sit to Supine: minimum assistance  · Transfers:     · Sit to Stand:  contact guard assistance with rolling walker  · Gait: 5 steps to HOB, with RW and Min A  · Balance: sitting balance: fair+, standing balance: fair       [facility] 6 CLICK MOBILITY  Turning over in bed (including adjusting bedclothes, sheets and blankets)?: 3  Sitting down on and standing up from a chair with arms (e.g., wheelchair, bedside commode, etc.): 3  Moving from lying on back to sitting on the side of the bed?: 3  Moving to and from a bed to a chair (including a wheelchair)?: 3  Need to walk in hospital room?: 1  Climbing 3-5 steps with a railing?: 1  Total Score: 14       Therapeutic Activities and Exercises:   -bed mobility as listed above  -Sit>stand X2 with CGA and RW, with seated rest break between each trial.   -Seated therex: BLE: LAQ 2x5, seated hip flexion 2x5 (unable to actively perform on right;however, visible muscles contraction noted.) 2x10 AP (unable to actively perform on right;however, visible muscles contraction noted.) Heel cord stretch to R 4x15 seconds.    Patient left HOB elevated with all lines intact, call button in reach, bed alarm on,TERI Tomlinson notified and B hotpacks to shin..    GOALS:    Physical Therapy Goals        Problem: Physical Therapy Goal    Goal Priority Disciplines Outcome Goal Variances Interventions   Physical Therapy Goal     PT/OT, PT Ongoing (interventions implemented as appropriate)     Description:  Goals to be met by: 18     Patient will increase functional independence with mobility by performin. Supine to sit with Stand-by Assistance  2. Sit to  supine with Stand-by Assistance MET 1/7/2018  3. Sit to stand transfer with Moderate Assistance MET 1/7/2018  4. Bed to chair transfer with Moderate Assistance using least restirctive AD MET 1/7/2018  5.  Assess gait     Updated Goals:  1. Supine<>sit with SBA  2. Sit to stand to SBA with RW  3. Bed to chair transfers with RW and CGA  4. Gait x 20 ft with RW and CGA                       Time Tracking:     PT Received On: 01/08/18  PT Start Time: 1120     PT Stop Time: 1147  PT Total Time (min): 27 min     Billable Minutes: Gait Training 10 and Therapeutic Activity 17    Treatment Type: Treatment  PT/PTA: PT     PTA Visit Number: 0     Shola Posadas PT, DPT  01/08/2018

## 2018-01-09 PROBLEM — T58.91XA TOXIC EFFECT OF CARBON MONOXIDE, UNINTENTIONAL: Status: ACTIVE | Noted: 2018-01-09

## 2018-01-09 PROBLEM — R74.8 ABNORMAL LIVER ENZYMES: Status: RESOLVED | Noted: 2018-01-05 | Resolved: 2018-01-09

## 2018-01-09 PROBLEM — M62.82 NON-TRAUMATIC RHABDOMYOLYSIS: Status: RESOLVED | Noted: 2018-01-05 | Resolved: 2018-01-09

## 2018-01-09 PROBLEM — R79.89 AZOTEMIA: Status: RESOLVED | Noted: 2018-01-05 | Resolved: 2018-01-09
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
## 2018-01-09 PROBLEM — E87.20 METABOLIC ACIDOSIS: Status: RESOLVED | Noted: 2018-01-05 | Resolved: 2018-01-09

## 2018-01-09 LAB
ANION GAP SERPL CALC-SCNC: 9 MMOL/L
BUN SERPL-MCNC: 14 MG/DL
CALCIUM SERPL-MCNC: 8.5 MG/DL
CHLORIDE SERPL-SCNC: 98 MMOL/L
CO2 SERPL-SCNC: 22 MMOL/L
CREAT SERPL-MCNC: 0.8 MG/DL
EST. GFR  (AFRICAN AMERICAN): >60 ML/MIN/1.73 M^2
EST. GFR  (NON AFRICAN AMERICAN): >60 ML/MIN/1.73 M^2
GLUCOSE SERPL-MCNC: 122 MG/DL
POTASSIUM SERPL-SCNC: 4 MMOL/L
SODIUM SERPL-SCNC: 129 MMOL/L

## 2018-01-09 PROCEDURE — G9168 MEMORY CURRENT STATUS: HCPCS | Mod: CK

## 2018-01-09 PROCEDURE — 94761 N-INVAS EAR/PLS OXIMETRY MLT: CPT

## 2018-01-09 PROCEDURE — 80048 BASIC METABOLIC PNL TOTAL CA: CPT

## 2018-01-09 PROCEDURE — 63600175 PHARM REV CODE 636 W HCPCS: Performed by: HOSPITALIST

## 2018-01-09 PROCEDURE — 97116 GAIT TRAINING THERAPY: CPT

## 2018-01-09 PROCEDURE — G9169 MEMORY GOAL STATUS: HCPCS | Mod: CJ

## 2018-01-09 PROCEDURE — 36415 COLL VENOUS BLD VENIPUNCTURE: CPT

## 2018-01-09 PROCEDURE — 92523 SPEECH SOUND LANG COMPREHEN: CPT

## 2018-01-09 PROCEDURE — 97530 THERAPEUTIC ACTIVITIES: CPT

## 2018-01-09 PROCEDURE — 97535 SELF CARE MNGMENT TRAINING: CPT

## 2018-01-09 PROCEDURE — 11000001 HC ACUTE MED/SURG PRIVATE ROOM

## 2018-01-09 PROCEDURE — 25000003 PHARM REV CODE 250: Performed by: HOSPITALIST

## 2018-01-09 PROCEDURE — A4216 STERILE WATER/SALINE, 10 ML: HCPCS | Performed by: HOSPITALIST

## 2018-01-09 RX ORDER — ONDANSETRON 8 MG/1
8 TABLET, ORALLY DISINTEGRATING ORAL EVERY 8 HOURS PRN
Status: DISCONTINUED | OUTPATIENT
Start: 2018-01-10 | End: 2018-01-18 | Stop reason: HOSPADM

## 2018-01-09 RX ADMIN — SODIUM CHLORIDE TAB 1 GM 1 G: 1 TAB at 09:01

## 2018-01-09 RX ADMIN — ASPIRIN 81 MG: 81 TABLET, COATED ORAL at 09:01

## 2018-01-09 RX ADMIN — ENOXAPARIN SODIUM 40 MG: 100 INJECTION SUBCUTANEOUS at 05:01

## 2018-01-09 RX ADMIN — THERA TABS 1 TABLET: TAB at 09:01

## 2018-01-09 RX ADMIN — SODIUM CHLORIDE, PRESERVATIVE FREE 3 ML: 5 INJECTION INTRAVENOUS at 02:01

## 2018-01-09 RX ADMIN — SODIUM CHLORIDE, PRESERVATIVE FREE 3 ML: 5 INJECTION INTRAVENOUS at 09:01

## 2018-01-09 NOTE — ASSESSMENT & PLAN NOTE
Azotemia  Dehydration with hyponatremia  Metabolic acidosis  Stop IV fluids. CK down to 227. Sodium is unchanged, so possible SIADH. Will start on salt tablets today. Monitor the renal function and CK.

## 2018-01-09 NOTE — PROGRESS NOTES
"  [facility]  Adult Nutrition  Consult Note    SUMMARY     Recommendations    Recommendation/Intervention:   1. Continue current diet   2. RD to monitor    Goals: Pt will meet at least 85% EEN  Nutrition Goal Status: goal met  Communication of RD Recs: reviewed with RN    Continuum of Care Plan    Referral to Outpatient Services:  (pt to d/c on Regular diet)    Reason for Assessment    Reason for Assessment: RD follow-up  Diagnosis:  (non traumatic rhabdomyolysis)  Relevent Medical History: depression, headache, ADD, HTN       General Information Comments: Diet advanced to Regular. Patient tolerating: eating % of meals. Denies n/v/chewing or swallowing issues. Reports UBW of 165%. Denies desire for supplements at this time or use at home.      Nutrition Prescription Ordered    Current Diet Order: Regular      Evaluation of Received Nutrients/Fluid Intake     Energy Calories Required: meeting needs   Protein Required: meeting needs     I/O: 680/1307       Fluid Required: meeting needs  Comments: LBM: 1/8  Tolerance: tolerating      Nutrition Risk Screen     Nutrition Risk Screen: no indicators present    Nutrition/Diet History     Food Preferences: No Scientologist, food preferences     Labs/Tests/Procedures/Meds     Pertinent Labs Reviewed: reviewed  Pertinent Labs Comments: Na 127L, BUN 47H, Alb 3.1L  Pertinent Medications Reviewed: reviewed     Physical Findings    Overall Physical Appearance: overweight  Tubes:  (none)  Oral/Mouth Cavity:  (unable to assess)  Skin:  (intact)    Anthropometrics    Temp: 100.1 °F (37.8 °C)     Height: 5' 4" (162.6 cm)  Weight Method: Bed Scale  Weight: 74.9 kg (165 lb 2 oz)  Ideal Body Weight (IBW), Male: 130 lb     % Ideal Body Weight, Male (lb): 127.87 lb     BMI (Calculated): 28.6  BMI Grade: 25 - 29.9 - overweight     Estimated/Assessed Needs    Weight Used For Calorie Calculations: 74.8 kg (164 lb 14.5 oz)      Energy Calorie Requirements (kcal): 6277-1478 " kcal  Energy Need Method: Bent-St Jeor    RMR (Bent-St. Jeor Equation): 1479      Weight Used For Protein Calculations: 74.8 kg (164 lb 14.5 oz)  Protein Requirements: 75g (1.0g/kg)     Fluid Need Method: RDA Method      RDA Method (mL): 1800        Assessment and Plan    Nutrition Dx: Inadequate Energy Intake r/t medical procedures as evidenced by NPO  Nutrition Dx Status: resolved      Monitor and Evaluation    Food and Nutrient Intake: food and beverage intake  Food and Nutrient Adminstration: diet order     Physical Activity and Function: nutrition-related ADLs and IADLs  Anthropometric Measurements: weight  Biochemical Data, Medical Tests and Procedures: electrolyte and renal panel  Nutrition-Focused Physical Findings: overall appearance    Nutrition Risk    Level of Risk:  (1 x week)    Nutrition Follow-Up    RD Follow-up?: Yes

## 2018-01-09 NOTE — ASSESSMENT & PLAN NOTE
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  Appreciate Neurology.  Hyperbaric oxygen therapy not indicated per Dr. Kuo.  Inpatient rehab.

## 2018-01-09 NOTE — PROGRESS NOTES
The Sw received a call from [facility] in [facility]([phone])who states she received the referral on the pt and she will call the pt's friend Susan to speak with her about their facility. The Sw gave her the contact info to contact Susan.

## 2018-01-09 NOTE — PT/OT/SLP PROGRESS
Occupational Therapy   Treatment    Name: Spenser Rabago  MRN: [id number]  Admitting Diagnosis:  Non-traumatic rhabdomyolysis       Recommendations:     Discharge Recommendations: rehabilitation facility  Discharge Equipment Recommendations:   (Defer to IPR)  Barriers to discharge:  Inaccessible home environment    Subjective     Communicated with: nurseObdulio prior to session.  Pain/Comfort:  · Pain Rating 1: 0/10  · Pain Rating Post-Intervention 1: 0/10    Objective:     Patient found with: bed alarm, peripheral IV, telemetry, pressure relief boots    General Precautions: Standard, fall   Orthopedic Precautions:N/A   Braces: N/A     Bed Mobility:    · Patient completed Rolling/Turning to Left with  contact guard assistance and with side rail  · Patient completed Rolling/Turning to Right with contact guard assistance and with side rail  · Patient completed Scooting/Bridging with contact guard assistance and to EOB  · Patient completed Supine to Sit with contact guard assistance, minimum assistance, with side rail and increased time and VCs  · Patient completed Sit to Supine with contact guard assistance     Functional Mobility/Transfers:  · Patient completed Sit <> Stand Transfer with contact guard assistance  with  rolling walker     Activities of Daily Living:  · Grooming: stand by assistance    · UB Dressing: contact guard assistance    · Toileting: total assistance      Patient left HOB elevated with all lines intact, call button in reach, bed alarm on and RN notified    [facility] 6 Click:  [facility] Total Score: 15    Treatment & Education:  Patient with bed mob as noted above. Requires increased time and breakdown of steps/motor commands to follow. Sat EOB with SBA. Patient noted to be soiled s/p bowel accident. Returned supine and total (A) for hygiene. Patient returned to EOB and stood abruptly without (A) and in unsafe manner. Returned to EOB with CGA.  Completed G/H tasks with set-up (A). L hand still  edematous, but patient reports no pain and is still able to use it functionally. Patient stood with RW with Min (A) and able to take steps forward and backward with CGA and VCs - remains with R foot drop and compensating with hip/knee flexion to  foot. Patient returned supine with bed alarm in place.   Education:    Assessment:   Patient with some improvement in bed mobility and activity tolerance this date. Still remains with decreased cognition, flat affect, delayed motor processing and response times to commands. Will benefit from inpatient rehab to address functional deficits. Cont OT efforts.    Spenser Rabago is a 57 y.o. male with a medical diagnosis of Non-traumatic rhabdomyolysis. Performance deficits affecting function are weakness, impaired endurance, impaired self care skills, impaired functional mobilty, gait instability, impaired balance, decreased upper extremity function, decreased lower extremity function, impaired cognition, decreased safety awareness, impaired skin, edema, impaired fine motor, impaired coordination, decreased ROM.      Rehab Prognosis:  Good; patient would benefit from acute skilled OT services to address these deficits and reach maximum level of function.       Plan:     Patient to be seen 5 x/week to address the above listed problems via self-care/home management, therapeutic activities, therapeutic exercises  · Plan of Care Expires: 02/05/18  · Plan of Care Reviewed with: patient    This Plan of care has been discussed with the patient who was involved in its development and understands and is in agreement with the identified goals and treatment plan    GOALS:    Occupational Therapy Goals        Problem: Occupational Therapy Goal    Goal Priority Disciplines Outcome Interventions   Occupational Therapy Goal     OT, PT/OT Ongoing (interventions implemented as appropriate)    Description:  Goals to be met by: 2/5     Patient will increase functional independence with  ADLs by performing:    UE Dressing with Set-up Assistance.  Grooming while standing at sink with Moderate Assistance.  Toileting from toilet with Moderate Assistance for hygiene and clothing management.   Supine to sit with Stand-by Assistance.  Toilet transfer to toilet with Moderate Assistance.  Increased functional strength to 4/5 for BUE.                      Time Tracking:     OT Date of Treatment: 01/09/18  OT Start Time: 1101  OT Stop Time: 1143  OT Total Time (min): 42 min    Billable Minutes:Self Care/Home Management 30  Therapeutic Activity 12    TING Garay  1/9/2018

## 2018-01-09 NOTE — HOSPITAL COURSE
Rhabdomyolysis resolved with IV fluids.  He was found to have right greater than left lower extremity weakness.  MRI showed bilateral hyperintensity in the globus pallidi and throughout the supratentorial white matter.  Neurology was concerned for carbon monoxide exposure/toxicity.  His carboxyhemoglobin was within normal limits.  Neurology recommended hyperbaric oxygen therapy, but director of hyperbarics Dr. Alaina Kuo said that was only indicated in acute CO poisoning.  His family went to his house and found black mold in the vents.  Inpatient rehab was sought and he waited around for a few days.  Lisinopril was stopped because his blood pressures were controlled on metoprolol alone.  Alprazolam was stopped because he no longer had anxiety likely due to the carbon monoxide brain damage.              He was set up for discharge to inpatient rehab, but on 1/10/18, he suddenly developed new fever.  Urinalysis showed no evidence for UTI.  Chest x-ray showed no pneumonia.  Influenza swab was negative.  CBC showed new leukocytosis.  Procalcitonin was elevated.  He denied any specific symptoms except for mild cough.  His left hand distal to a peripheral IV was swollen, with mild redness and tenderness at the IV site.  Doxycycline was started for suspected cellulitis.  Peripheral IVs were removed.  The blood cultures grew methicillin-sensitive Staphylococcus aureus so a new peripheral IV was placed, doxycycline was changed to IV vancomycin, and Infectious Disease was consulted.  Leukocytosis resolved after initiating antibiotics.  Infectious Disease noted the hand cellulitis, but Mr. Rabago told them that it had been there since admission.  They recommended repeating the echocardiogram, after hearing a systolic murmur.  Transthoracic echocardiogram showed no evidence of vegetation.  Tissue ultrasound of the left hand was unremarkable.  Infectious Disease recommended transesophageal echocardiogram to evaluate for  endocarditis, and there was no vegetation seen.  A right arm PICC was placed.  He will complete vancomycin on 1/26/18 with a goal vancomycin trough of 15-20.

## 2018-01-09 NOTE — PROGRESS NOTES
[facility] Hospital Medicine  Progress Note    Patient Name: Spenser Rabago  MRN: [id number]  Patient Class: IP- Inpatient   Admission Date: 1/5/2018  Length of Stay: 4 days  Attending Physician: Carlos Henderson MD  Primary Care Provider: Vladimir Apple MD        Subjective:     Principal Problem:Non-traumatic rhabdomyolysis    HPI:  Spenser Rabago is a 57 y.o. white man with hypertension, migraine headaches, attention deficit hyperactivity disorder, and depression.  He lives in [place], [place].  His primary care physician is Dr. Vladimir Apple.  His neurologist is Dr. Frank Lancaster.   He was taken by EMS to [facility] on 1/5/18 after falling and being on the floor for at least 6 hours.  Four days prior to this, a box fell on his right foot and he had pain and difficulty walking.  He was falling a lot after that.  He also had decreased appetite and oral intake the past week.  He was found to have dehydration (sodium 127, BUN 47, urine with ketones and hyaline casts), high anion gap acidosis (bicarbonate 18, anion gap 20), and rhabdomyolysis (CPK 1759).  He was admitted to [facility].    Hospital Course:  Rhabdomyolysis resolved with IV fluids.  He was found to have right greater than left lower extremity weakness.  MRI showed bilateral hyperintensity in the globus pallidi and throughout the supratentorial white matter.  Neurology was concerned for carbon monoxide exposure/toxicity.  His carboxyhemoglobin was within normal limits.  Neurology recommended hyperbaric oxygen therapy, but director of hyperbarics Dr. Alaina Kuo said that was only indicated in acute CO poisoning.  His family went to his house and found black mold in the vents.  Inpatient rehab was sought.    Interval History: Had a temperature of 100.1 F but no complaints.    Review of Systems   Constitutional: Negative for chills and fever.   Respiratory: Negative for cough and shortness  of breath.    Cardiovascular: Negative for chest pain and palpitations.   Gastrointestinal: Negative for nausea and vomiting.     Objective:     Vital Signs (Most Recent):  Temp: 100.1 °F (37.8 °C) (01/09/18 0837)  Pulse: 84 (01/09/18 1000)  Resp: 14 (01/09/18 0837)  BP: 132/83 (01/09/18 0837)  SpO2: 96 % (01/09/18 0757) Vital Signs (24h Range):  Temp:  [96.1 °F (35.6 °C)-100.1 °F (37.8 °C)] 100.1 °F (37.8 °C)  Pulse:  [78-98] 84  Resp:  [12-19] 14  SpO2:  [96 %-98 %] 96 %  BP: (132-137)/(80-91) 132/83     Weight: 74.9 kg (165 lb 2 oz)  Body mass index is 28.34 kg/m².    Intake/Output Summary (Last 24 hours) at 01/09/18 1036  Last data filed at 01/09/18 0830   Gross per 24 hour   Intake              680 ml   Output             1107 ml   Net             -427 ml      Physical Exam   Constitutional: He is oriented to person, place, and time. He appears well-developed and well-nourished. No distress.   HENT:   Head: Normocephalic and atraumatic.   Abdominal: Soft. He exhibits no distension. There is no tenderness.   Musculoskeletal: He exhibits edema and tenderness (left hand).   Neurological: He is alert and oriented to person, place, and time.   Psychiatric: His affect is blunt. He is slowed. He exhibits a depressed mood.   Nursing note and vitals reviewed.      Significant Labs:   CBC: No results for input(s): WBC, HGB, HCT, PLT in the last 48 hours.  CMP:     Recent Labs  Lab 01/08/18  0350 01/09/18  0700   * 129*   K 4.3 4.0   CL 97 98   CO2 23 22*    122*   BUN 13 14   CREATININE 0.7 0.8   CALCIUM 8.1* 8.5*   ANIONGAP 8 9   EGFRNONAA >60 >60     Magnesium:     Recent Labs  Lab 01/08/18  0350   MG 1.7       Significant Imaging: I have reviewed all pertinent imaging results/findings within the past 24 hours.   X-Ray Foot Complete Right 1/05/18: No fractures.  No osseous destruction.  Distal Achilles enthesopathy noted.  Cartilage spaces are well preserved.  Tarsometatarsal alignment is well maintained  noting nonweightbearing films. Subcutaneous soft tissues demonstrate minimal swelling along the dorsum of the midfoot.  X-Ray Chest AP Portable 1/05/18: Mediastinal structures are midline.  Cardiac silhouette is normal in size.  Lung volumes are normal and symmetric.  No focal consolidation.  No pneumothorax or pleural effusions.  No acute osseous abnormalities.  Degenerative changes of the spine noted. No free air beneath the diaphragm.  CT Cervical Spine Without Contrast 1/05/18: Cervical spine alignment demonstrates 2 mm of retrolisthesis of C3 on C4.  Occipital condyles and odontoid process are intact.  Atlantooccipital and atlantoaxial alignment are within normal limits.  Atlantodens interval is within normal limits allowing for degenerative changes.  Vertebral body heights are well-maintained without evidence for fracture.  Facet joints are well aligned.  Transverse foramina are unremarkable.  Posterior elements demonstrate no significant abnormalities.  Mandibular condyles are in their expected location.  Multilevel degenerative changes identified, most pronounced at C3-C4 and C6-C7.  No large focal disc herniation by CT criteria.  Prevertebral soft tissues are normal.  Parotid, submandibular and thyroid glands are within normal limits.  No cervical lymph node enlargement.  Lung apices are clear.  Paraspinal musculature demonstrates no significant abnormalities.  X-Ray Hand 3 view Left 1/05/18: No fractures.  No osseous destruction.  Mild cartilage space narrowing of the DIP joints noted.  Remaining cartilage spaces are well preserved.  Subcutaneous soft tissues are within normal limits.  No radiopaque foreign bodies.  MRI Brain W WO Contrast 1/05/18: The ventricles are normal in size for age, without evidence of hydrocephalus.  There is symmetric confluent T2/FLAIR signal hyperintensity throughout the supratentorial white matter which is new or notably progressed from the prior exam. Subtle diffusion  restriction present throughout this region. This involves primarily the deep periventricular white matter at the level of the centrum semiovale and corona radiata. Symmetric T2/FLAIR hyperintensity in the region of the globus thalami, appears less conspicuous than the prior exam.  No parenchymal mass lesion or hemorrhage. No recent or remote major vascular distribution infarct. No abnormal postcontrast parenchymal or leptomeningeal enhancement.  No extra-axial blood or fluid collections.  The T2 skull base flow voids are preserved. Bone marrow signal intensity is unremarkable.  Impression: Persistent T2 FLAIR hyperintensity within the globus pallidi (without diffusion restriction on today's exam). Findings again suggest recent carbon monoxide poisoning or other prolonged hypoxic event.  New symmetric confluent T2/FLAIR hyperintensity and subtle diffusion restriction throughout the supratentorial white matter. 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  Other acute toxic leukoencephalopathy would have a similar appearance with broad differential considerations for toxic exposure include prescription drugs (antineoplastic, immunosuppressive, antimicrobial), non-prescription drugs (heroin, MDMA, ethanol) and environmental exposures (carbon monoxide, arsenic, carbon tetrachloride). An inflammatory/infectious etiology or other demyelinating process possible but thought less likely. Clinical correlation advised.  MRA Brain MRA Neck with contrast 1/05/18:  Common and internal carotid arteries are normal in caliber.  No significant stenosis at either carotid bifurcation.  Right vertebral artery is diminutive in caliber on a developmental basis. Vertebral arteries are otherwise unremarkable. The vertebrobasilar system appears within normal limits.   The ACAs, MCAs and PCAs demonstrate no evidence of  high-grade stenosis, focal occlusion or intracranial aneurysm.  Impression: MR angiogram of the head  and neck appears within normal limits. No high grade stenosis or major branch occlusion.  2D echo with color flow doppler 1/05/18:     1 - Normal left ventricular systolic function (EF 60-65%).     2 - Impaired LV relaxation, normal LAP (grade 1 diastolic dysfunction).     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  Appreciate Neurology.  Hyperbaric oxygen therapy not indicated per Dr. Kuo.  Inpatient rehab.        Attention deficit hyperactivity disorder (ADHD)    Holding Adderall for now.         Essential hypertension    Hold home Toprol XL, lisinopril.  Monitor.         Depression    Will resume trazodone and Wellbutrin in coming days.           Mixed migraine and muscle contraction headache    Currently has alprazolam, baclofen, Percocet, Opana ER, tizanadine, and topiramate on his med list in our system.  He could not confirm medications at this time.  His primary care physician and neurologist are [facility] so should be correct.  Not complaining of any headaches or other pain at this time.           VTE Risk Mitigation         Ordered     enoxaparin injection 40 mg  Daily     Route:  Subcutaneous        01/05/18 1000     High Risk of VTE  Once      01/05/18 1000     Reason for No Pharmacological VTE Prophylaxis  Once      01/05/18 1000     Place sequential compression device  Until discontinued      01/05/18 1000              Carlos Henderson MD  Department of Hospital Medicine   [facility]

## 2018-01-09 NOTE — PLAN OF CARE
TN went to meet with patient, no family present at bedside. TN discussed with patient therapy recommending IPR on discharge. TN educated him regarding this and to see if he would be amendable to it. Patient was agreeable. He would like a place close to his home. He stated ok for TN to update friend Susan and brother Mando. TN spoke with Susan, she stated would like to get patient in close to where his family is. They live near [place] and [place].TN researched facilities, she is familiar with [facility] she believes. TN called patient's brother, he was at work, so TN updated his sister-in-law. TN also spoke with patient, and stated ok to submit to facilities near [place] and [place] instead. TN informed patient [facility] in [place] has a rehab as well as [facility] in [place]. He stated ok to submit to those facilities. TN updated SW, Samantha.    -Update: TN spoke with brother Mando, he is in agreement as well.    Future Appointments  Date Time Provider Department Center   6/29/2018 3:40 PM Frank Lancaster MD [facility] [place]        01/09/18 0937   Discharge Reassessment   Assessment Type Discharge Planning Reassessment   Provided patient/caregiver education on the expected discharge date and the discharge plan Yes   Do you have any problems affording any of your prescribed medications? TBD   Discharge Plan A Rehab   Discharge Plan B Skilled Nursing Facility   Patient choice form signed by patient/caregiver N/A   Describe the patient's ability to walk at the present time. Walks with the help of equipment     Maureen Haney RN  Transition Navigator  [phone]

## 2018-01-09 NOTE — ASSESSMENT & PLAN NOTE
Currently has alprazolam, baclofen, percocet, opana ER, tizanadine, and topiramate on his med list in our system. He could not confirm medications at this time. Will attempt to confirm with his pharmacy. Not complaining of any headaches or other pain at this time.

## 2018-01-09 NOTE — PT/OT/SLP EVAL
Speech Language Pathology Evaluation  Cognitive Communication    Patient Name:  Spenser Rabago   MRN:  [id number]  Admitting Diagnosis: Non-traumatic rhabdomyolysis    Recommendations:     Recommendations:                General Recommendations:  Cognitive-linguistic therapy  Diet recommendations:  Regular, Thin   Aspiration Precautions: Standard aspiration precautions   General Precautions: Standard, fall  Communication strategies:  provide increased time to answer    History:     Past Medical History:   Diagnosis Date    ADD (attention deficit disorder)     Depression     Headache(784.0)     Hypertension     Migraines      Chief Complaint   Patient presents with    Fall       pt. called 911 s/p fall. pt states he has been on floor unable to get up since approx. 830pm last night. pt. also states he fell tuesday and has rt. foot pain/swelling. pt. has scab to lt. cheek, abrasion with redness/swelling to rt. hand.          HPI: Mr. Rabago is a 56 yo WM with chronic headaches/migraines, ADD, and HTN that presented to [facility] after calling EMS because he fell and could not get up. He reports being on the floor at least 6 hours prior to EMS arrival. He says that 4 days PTA a box fell on his right foot and he has had pain and difficulty walking since that time. He reports falling yesterday and having pain in his left hand as well. He says that he has been falling a lot over the last few days. He also reports having decreased appetite and oral intake over the last week. He denies any nausea or vomiting.        History reviewed. No pertinent surgical history.    Social History: Patient lives at home alone. Pt w/ recent falls.     Chest X-Rays:  CXR: I have reviewed all pertinent results/findings within the past 24 hours and my personal findings are:  No focal infiltrates     Prior diet: regular diet and thin liquids    Occupation/hobbies/homemaking: pt was working full-time prior to falls.       Subjective   Pt seen at  "bedside due to OT concerns of impaired cognition.   Patient goals: "I wanna get stronger."     Objective:   Cognitive Status:    Arousal/Alertness Delayed response to stimuli delayed responses  Attention tends to zone out at times  Orientation oriented to person, disoriented to place and time  Memory dcr recall of 3/3 unrelated words  Problem Solving impaired  Safety awareness dcr recall of safety issues     Receptive Language:   Comprehension:   1 step commands 60% acc with time for processing required  2-3 step, increased delay to execute as length and complexity increased, impaired    Pragmatics:    Flat affect noted    Expressive Language:  Verbal:    Verbal expression is fluent, naming objects is 7/10, categorization is impaired, named 2 items during 1 minute interval       Motor Speech:  Dysarthria none    Voice:   Quality Strained, raspy quality noted    Treatment: continue with POC goals     Assessment:   Spenser Rabago is a 57 y.o. male with an SLP diagnosis of cognitive-linguistic deficits which impact problem solving, impaired attention/impaired memory and impaired orientation. Pt is not safe to be discharged to home environment at this time.   Pt will benefit from further ST services at next level of care.     Goals:    SLP Goals        Problem: SLP Goal    Goal Priority Disciplines Outcome   SLP Goal     SLP Ongoing (interventions implemented as appropriate)   Description:  Short Term Goals:  1. Patient will successfully participate in tnugns-wzzpjsaa-jmwqklglr evaluation to further assess for any communication impairments.  2. Pt will answer y/n complex questions with 90%.  3. Pt will recall events of the day with min A using compensatory memory strategies.  4. Pt will complete 3-4 word mental manipulation tasks with 80%.   5. Pt will complete category exclusion tasks with 80%.   6. Pt will complete time/money reasoning tasks with 80%.   7. Pt will state 3 physical/cognitive changes impacting " safety/independence with ADLS with min A.                       Plan:   · Patient to be seen:  3 x/week   · Plan of Care expires:  02/07/18  · Plan of Care reviewed with:  patient   · SLP Follow-Up:  Yes       Discharge recommendations:  Discharge Facility/Level Of Care Needs: rehabilitation facility     Time Tracking:   SLP Treatment Date:   01/09/18  Speech Start Time:  0955  Speech Stop Time:  1015     Speech Total Time (min):  20 min    Billable Minutes: Eval 20    KAILA Nash, CCC-SLP  01/09/2018

## 2018-01-09 NOTE — PLAN OF CARE
Problem: Occupational Therapy Goal  Goal: Occupational Therapy Goal  Goals to be met by: 2/5     Patient will increase functional independence with ADLs by performing:    UE Dressing with Set-up Assistance.  Grooming while standing at sink with Moderate Assistance.  Toileting from toilet with Moderate Assistance for hygiene and clothing management.   Supine to sit with Stand-by Assistance.  Toilet transfer to toilet with Moderate Assistance.  Increased functional strength to 4/5 for BUE.     Outcome: Ongoing (interventions implemented as appropriate)  Patient with some improvement in bed mobility and activity tolerance this date. Still remains with decreased cognition, flat affect, delayed motor processing and response times to commands. Will benefit from inpatient rehab to address functional deficits. Cont OT efforts.

## 2018-01-09 NOTE — ASSESSMENT & PLAN NOTE
Currently has alprazolam, baclofen, Percocet, Opana ER, tizanadine, and topiramate on his med list in our system.  He could not confirm medications at this time.  His primary care physician and neurologist are [facility] so should be correct.  Not complaining of any headaches or other pain at this time.

## 2018-01-09 NOTE — PLAN OF CARE
Problem: Patient Care Overview  Goal: Plan of Care Review  Outcome: Ongoing (interventions implemented as appropriate)  Pt is in bed resting with no S/S of pain or distress. He has a flat affect and is incontinent. He is NSR on telemetry. He had consults to PT/OT and Hyperbarics. He is currently stable and will continue to monitor. Will give report to oncoming nurse.

## 2018-01-09 NOTE — PLAN OF CARE
Problem: SLP Goal  Goal: SLP Goal  Short Term Goals:  1. Patient will successfully participate in anawab-olfhcyzh-bjbsrgfll evaluation to further assess for any communication impairments.  2. Pt will answer y/n complex questions with 90%.  3. Pt will recall events of the day with min A using compensatory memory strategies.  4. Pt will complete 3-4 word mental manipulation tasks with 80%.   5. Pt will complete category exclusion tasks with 80%.   6. Pt will complete time/money reasoning tasks with 80%.   7. Pt will state 3 physical/cognitive changes impacting safety/independence with ADLS with min A.     Outcome: Ongoing (interventions implemented as appropriate)  Speech/lang and cog eval completed, See report for details.

## 2018-01-09 NOTE — PROGRESS NOTES
[facility] Hospital Medicine  Progress Note    Patient Name: Spenser Rabago  MRN: [id number]  Patient Class: IP- Inpatient   Admission Date: 1/5/2018  Length of Stay: 4 days  Attending Physician: Manjit Farnsworth MD  Primary Care Provider: Vladimir Apple MD        Subjective:     Principal Problem:Non-traumatic rhabdomyolysis    HPI:  Mr. Rabago is a 56 yo WM with chronic headaches/migraines, ADD, and HTN that presented to [facility] after calling EMS because he fell and could not get up. He reports being on the floor at least 6 hours prior to EMS arrival. He says that 4 days PTA a box fell on his right foot and he has had pain and difficulty walking since that time. He reports falling yesterday and having pain in his left hand as well. He says that he has been falling a lot over the last few days. He also reports having decreased appetite and oral intake over the last week. He denies any nausea or vomiting.     Hospital Course:  Found to have R>L LE weakness. MRI showed bilateral hyperintensity in the globus pallidi and throughout the supratentorial white matter. Neurology is concerned for carbon monoxide exposure/toxicity. His carboxyhemoglobin was WNL. Neurology recommended hyperbaric oxygen therapy, but Dr. Kuo said that was only indicated in acute CO poisoning.     Interval History: Still weak, but eating better and working with therapy.     Review of Systems   Constitutional: Negative for chills and fever.   Respiratory: Negative for cough and shortness of breath.    Cardiovascular: Negative for chest pain and palpitations.   Gastrointestinal: Negative for nausea and vomiting.     Objective:     Vital Signs (Most Recent):  Temp: 96.1 °F (35.6 °C) (01/08/18 1242)  Pulse: 78 (01/08/18 1242)  Resp: 16 (01/08/18 1242)  BP: (!) 137/91 (01/08/18 1242)  SpO2: 98 % (01/08/18 1604) Vital Signs (24h Range):  Temp:  [96.1 °F (35.6 °C)-99.3 °F (37.4 °C)] 96.1 °F (35.6 °C)  Pulse:  [68-92] 78  Resp:   [16-18] 16  SpO2:  [96 %-98 %] 98 %  BP: (123-137)/(77-91) 137/91     Weight: 74.9 kg (165 lb 2 oz)  Body mass index is 28.34 kg/m².    Intake/Output Summary (Last 24 hours) at 01/08/18 1707  Last data filed at 01/08/18 0900   Gross per 24 hour   Intake              276 ml   Output             2046 ml   Net            -1770 ml      Physical Exam   Constitutional: He is oriented to person, place, and time. He appears well-developed and well-nourished. No distress.   HENT:   Head: Normocephalic and atraumatic.   Abdominal: Soft. He exhibits no distension. There is no tenderness.   Musculoskeletal: He exhibits edema and tenderness (left hand).   Neurological: He is alert and oriented to person, place, and time.   Psychiatric: His affect is blunt. He is slowed. He exhibits a depressed mood.   Nursing note and vitals reviewed.      Significant Labs:   CBC:   Recent Labs  Lab 01/07/18 0622   WBC 11.87   HGB 12.4*   HCT 36.1*        CMP:   Recent Labs  Lab 01/07/18 0622 01/08/18  0350   * 128*   K 3.4* 4.3   CL 95 97   CO2 25 23   * 103   BUN 15 13   CREATININE 0.7 0.7   CALCIUM 7.6* 8.1*   ANIONGAP 7* 8   EGFRNONAA >60 >60     Magnesium:   Recent Labs  Lab 01/07/18 0622 01/08/18  0350   MG 1.9 1.7       Significant Imaging: I have reviewed all pertinent imaging results/findings within the past 24 hours.    Assessment/Plan:      * Non-traumatic rhabdomyolysis    Azotemia  Dehydration with hyponatremia  Metabolic acidosis  Stop IV fluids. CK down to 227. Sodium is unchanged, so possible SIADH. Will start on salt tablets today. Monitor the renal function and CK.         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 Appreciate PT/OT. Appreciate Neurology. Hyperbaric oxygen therapy not indicated per Dr. Kuo. Trying to get family to check house. Inpatient rehab is recommended.         Abnormal liver enzymes    Likely related to the  rhabdo. Monitor. Improving.         Attention deficit hyperactivity disorder (ADHD)    Holding adderall for now.         Essential hypertension    Hold home Toprol XL, lisinopril. Monitor. SBP ranged 123 to 137.         Depression    Will resume trazodone and wellbutrin in coming days.           Mixed migraine and muscle contraction headache    Currently has alprazolam, baclofen, percocet, opana ER, tizanadine, and topiramate on his med list in our system. He could not confirm medications at this time. Will attempt to confirm with his pharmacy. Not complaining of any headaches or other pain at this time.           VTE Risk Mitigation         Ordered     enoxaparin injection 40 mg  Daily     Route:  Subcutaneous        01/05/18 1000     High Risk of VTE  Once      01/05/18 1000     Reason for No Pharmacological VTE Prophylaxis  Once      01/05/18 1000     Place sequential compression device  Until discontinued      01/05/18 1000              Manjit Farnsworth MD  Department of Hospital Medicine   [facility]

## 2018-01-09 NOTE — PROGRESS NOTES
The Sw faxed the pt's info to various [facility]'s in the [facility] via [employer].     11:31am The Sw spoke to Marylou at [facility] in [place] and she states she received the info on the pt and she's about to leave and come to the hospital to assess the pt. The Sw gave her the contact info for the pt's friend Susan and his brother Mando and she states she will speak with them while in route to the hospital to explain her facility to them.    12:28pm The pt has been accepted to [facility] per [employer].

## 2018-01-09 NOTE — SUBJECTIVE & OBJECTIVE
Interval History: Had a temperature of 100.1 F but no complaints.    Review of Systems   Constitutional: Negative for chills and fever.   Respiratory: Negative for cough and shortness of breath.    Cardiovascular: Negative for chest pain and palpitations.   Gastrointestinal: Negative for nausea and vomiting.     Objective:     Vital Signs (Most Recent):  Temp: 100.1 °F (37.8 °C) (01/09/18 0837)  Pulse: 84 (01/09/18 1000)  Resp: 14 (01/09/18 0837)  BP: 132/83 (01/09/18 0837)  SpO2: 96 % (01/09/18 0757) Vital Signs (24h Range):  Temp:  [96.1 °F (35.6 °C)-100.1 °F (37.8 °C)] 100.1 °F (37.8 °C)  Pulse:  [78-98] 84  Resp:  [12-19] 14  SpO2:  [96 %-98 %] 96 %  BP: (132-137)/(80-91) 132/83     Weight: 74.9 kg (165 lb 2 oz)  Body mass index is 28.34 kg/m².    Intake/Output Summary (Last 24 hours) at 01/09/18 1036  Last data filed at 01/09/18 0830   Gross per 24 hour   Intake              680 ml   Output             1107 ml   Net             -427 ml      Physical Exam   Constitutional: He is oriented to person, place, and time. He appears well-developed and well-nourished. No distress.   HENT:   Head: Normocephalic and atraumatic.   Abdominal: Soft. He exhibits no distension. There is no tenderness.   Musculoskeletal: He exhibits edema and tenderness (left hand).   Neurological: He is alert and oriented to person, place, and time.   Psychiatric: His affect is blunt. He is slowed. He exhibits a depressed mood.   Nursing note and vitals reviewed.      Significant Labs:   CBC: No results for input(s): WBC, HGB, HCT, PLT in the last 48 hours.  CMP:     Recent Labs  Lab 01/08/18  0350 01/09/18  0700   * 129*   K 4.3 4.0   CL 97 98   CO2 23 22*    122*   BUN 13 14   CREATININE 0.7 0.8   CALCIUM 8.1* 8.5*   ANIONGAP 8 9   EGFRNONAA >60 >60     Magnesium:     Recent Labs  Lab 01/08/18  0350   MG 1.7       Significant Imaging: I have reviewed all pertinent imaging results/findings within the past 24 hours.   X-Ray Foot  Complete Right 1/05/18: No fractures.  No osseous destruction.  Distal Achilles enthesopathy noted.  Cartilage spaces are well preserved.  Tarsometatarsal alignment is well maintained noting nonweightbearing films. Subcutaneous soft tissues demonstrate minimal swelling along the dorsum of the midfoot.  X-Ray Chest AP Portable 1/05/18: Mediastinal structures are midline.  Cardiac silhouette is normal in size.  Lung volumes are normal and symmetric.  No focal consolidation.  No pneumothorax or pleural effusions.  No acute osseous abnormalities.  Degenerative changes of the spine noted. No free air beneath the diaphragm.  CT Cervical Spine Without Contrast 1/05/18: Cervical spine alignment demonstrates 2 mm of retrolisthesis of C3 on C4.  Occipital condyles and odontoid process are intact.  Atlantooccipital and atlantoaxial alignment are within normal limits.  Atlantodens interval is within normal limits allowing for degenerative changes.  Vertebral body heights are well-maintained without evidence for fracture.  Facet joints are well aligned.  Transverse foramina are unremarkable.  Posterior elements demonstrate no significant abnormalities.  Mandibular condyles are in their expected location.  Multilevel degenerative changes identified, most pronounced at C3-C4 and C6-C7.  No large focal disc herniation by CT criteria.  Prevertebral soft tissues are normal.  Parotid, submandibular and thyroid glands are within normal limits.  No cervical lymph node enlargement.  Lung apices are clear.  Paraspinal musculature demonstrates no significant abnormalities.  X-Ray Hand 3 view Left 1/05/18: No fractures.  No osseous destruction.  Mild cartilage space narrowing of the DIP joints noted.  Remaining cartilage spaces are well preserved.  Subcutaneous soft tissues are within normal limits.  No radiopaque foreign bodies.  MRI Brain W WO Contrast 1/05/18: The ventricles are normal in size for age, without evidence of  hydrocephalus.  There is symmetric confluent T2/FLAIR signal hyperintensity throughout the supratentorial white matter which is new or notably progressed from the prior exam. Subtle diffusion restriction present throughout this region. This involves primarily the deep periventricular white matter at the level of the centrum semiovale and corona radiata. Symmetric T2/FLAIR hyperintensity in the region of the globus thalami, appears less conspicuous than the prior exam.  No parenchymal mass lesion or hemorrhage. No recent or remote major vascular distribution infarct. No abnormal postcontrast parenchymal or leptomeningeal enhancement.  No extra-axial blood or fluid collections.  The T2 skull base flow voids are preserved. Bone marrow signal intensity is unremarkable.  Impression: Persistent T2 FLAIR hyperintensity within the globus pallidi (without diffusion restriction on today's exam). Findings again suggest recent carbon monoxide poisoning or other prolonged hypoxic event.  New symmetric confluent T2/FLAIR hyperintensity and subtle diffusion restriction throughout the supratentorial white matter. 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  Other acute toxic leukoencephalopathy would have a similar appearance with broad differential considerations for toxic exposure include prescription drugs (antineoplastic, immunosuppressive, antimicrobial), non-prescription drugs (heroin, MDMA, ethanol) and environmental exposures (carbon monoxide, arsenic, carbon tetrachloride). An inflammatory/infectious etiology or other demyelinating process possible but thought less likely. Clinical correlation advised.  MRA Brain MRA Neck with contrast 1/05/18:  Common and internal carotid arteries are normal in caliber.  No significant stenosis at either carotid bifurcation.  Right vertebral artery is diminutive in caliber on a developmental basis. Vertebral arteries are otherwise unremarkable. The vertebrobasilar  system appears within normal limits.   The ACAs, MCAs and PCAs demonstrate no evidence of  high-grade stenosis, focal occlusion or intracranial aneurysm.  Impression: MR angiogram of the head and neck appears within normal limits. No high grade stenosis or major branch occlusion.  2D echo with color flow doppler 1/05/18:     1 - Normal left ventricular systolic function (EF 60-65%).     2 - Impaired LV relaxation, normal LAP (grade 1 diastolic dysfunction).

## 2018-01-09 NOTE — ASSESSMENT & PLAN NOTE
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 Appreciate PT/OT. Appreciate Neurology. Hyperbaric oxygen therapy not indicated per Dr. Kuo. Trying to get family to check house. Inpatient rehab is recommended.

## 2018-01-10 PROBLEM — R50.9 FEVER: Status: ACTIVE | Noted: 2018-01-10

## 2018-01-10 LAB
ANION GAP SERPL CALC-SCNC: 11 MMOL/L
BACTERIA BLD CULT: NORMAL
BACTERIA BLD CULT: NORMAL
BASOPHILS # BLD AUTO: 0.02 K/UL
BASOPHILS NFR BLD: 0.1 %
BUN SERPL-MCNC: 14 MG/DL
CALCIUM SERPL-MCNC: 8.8 MG/DL
CHLORIDE SERPL-SCNC: 96 MMOL/L
CO2 SERPL-SCNC: 23 MMOL/L
CREAT SERPL-MCNC: 0.7 MG/DL
DIFFERENTIAL METHOD: ABNORMAL
EOSINOPHIL # BLD AUTO: 0 K/UL
EOSINOPHIL NFR BLD: 0.1 %
ERYTHROCYTE [DISTWIDTH] IN BLOOD BY AUTOMATED COUNT: 12.4 %
EST. GFR  (AFRICAN AMERICAN): >60 ML/MIN/1.73 M^2
EST. GFR  (NON AFRICAN AMERICAN): >60 ML/MIN/1.73 M^2
FLUAV AG SPEC QL IA: NEGATIVE
FLUBV AG SPEC QL IA: NEGATIVE
GLUCOSE SERPL-MCNC: 104 MG/DL
HCT VFR BLD AUTO: 41.5 %
HGB BLD-MCNC: 14.3 G/DL
LYMPHOCYTES # BLD AUTO: 0.8 K/UL
LYMPHOCYTES NFR BLD: 5 %
MAGNESIUM SERPL-MCNC: 1.7 MG/DL
MCH RBC QN AUTO: 29.7 PG
MCHC RBC AUTO-ENTMCNC: 34.5 G/DL
MCV RBC AUTO: 86 FL
MONOCYTES # BLD AUTO: 1.2 K/UL
MONOCYTES NFR BLD: 7.3 %
NEUTROPHILS # BLD AUTO: 14.2 K/UL
NEUTROPHILS NFR BLD: 87.1 %
PHOSPHATE SERPL-MCNC: 3.6 MG/DL
PLATELET # BLD AUTO: 361 K/UL
PMV BLD AUTO: 9.6 FL
POCT GLUCOSE: 109 MG/DL (ref 70–110)
POTASSIUM SERPL-SCNC: 4.3 MMOL/L
RBC # BLD AUTO: 4.82 M/UL
SODIUM SERPL-SCNC: 130 MMOL/L
SPECIMEN SOURCE: NORMAL
WBC # BLD AUTO: 16.35 K/UL

## 2018-01-10 PROCEDURE — 11000001 HC ACUTE MED/SURG PRIVATE ROOM

## 2018-01-10 PROCEDURE — A4216 STERILE WATER/SALINE, 10 ML: HCPCS | Performed by: HOSPITALIST

## 2018-01-10 PROCEDURE — 87186 SC STD MICRODIL/AGAR DIL: CPT

## 2018-01-10 PROCEDURE — 80048 BASIC METABOLIC PNL TOTAL CA: CPT

## 2018-01-10 PROCEDURE — 83735 ASSAY OF MAGNESIUM: CPT

## 2018-01-10 PROCEDURE — 25000003 PHARM REV CODE 250: Performed by: HOSPITALIST

## 2018-01-10 PROCEDURE — 97116 GAIT TRAINING THERAPY: CPT

## 2018-01-10 PROCEDURE — 63600175 PHARM REV CODE 636 W HCPCS: Performed by: HOSPITALIST

## 2018-01-10 PROCEDURE — 87400 INFLUENZA A/B EACH AG IA: CPT

## 2018-01-10 PROCEDURE — 97530 THERAPEUTIC ACTIVITIES: CPT

## 2018-01-10 PROCEDURE — 97535 SELF CARE MNGMENT TRAINING: CPT

## 2018-01-10 PROCEDURE — 87040 BLOOD CULTURE FOR BACTERIA: CPT | Mod: 59

## 2018-01-10 PROCEDURE — 87077 CULTURE AEROBIC IDENTIFY: CPT

## 2018-01-10 PROCEDURE — 84100 ASSAY OF PHOSPHORUS: CPT

## 2018-01-10 PROCEDURE — 36415 COLL VENOUS BLD VENIPUNCTURE: CPT

## 2018-01-10 PROCEDURE — 85025 COMPLETE CBC W/AUTO DIFF WBC: CPT

## 2018-01-10 PROCEDURE — 94761 N-INVAS EAR/PLS OXIMETRY MLT: CPT

## 2018-01-10 RX ORDER — METOPROLOL SUCCINATE 50 MG/1
50 TABLET, EXTENDED RELEASE ORAL DAILY
Status: DISCONTINUED | OUTPATIENT
Start: 2018-01-10 | End: 2018-01-18 | Stop reason: HOSPADM

## 2018-01-10 RX ADMIN — SODIUM CHLORIDE TAB 1 GM 1 G: 1 TAB at 08:01

## 2018-01-10 RX ADMIN — SODIUM CHLORIDE, PRESERVATIVE FREE 3 ML: 5 INJECTION INTRAVENOUS at 02:01

## 2018-01-10 RX ADMIN — ASPIRIN 81 MG: 81 TABLET, COATED ORAL at 08:01

## 2018-01-10 RX ADMIN — ACETAMINOPHEN 650 MG: 325 TABLET ORAL at 03:01

## 2018-01-10 RX ADMIN — SODIUM CHLORIDE, PRESERVATIVE FREE 3 ML: 5 INJECTION INTRAVENOUS at 10:01

## 2018-01-10 RX ADMIN — SODIUM CHLORIDE TAB 1 GM 1 G: 1 TAB at 10:01

## 2018-01-10 RX ADMIN — THERA TABS 1 TABLET: TAB at 08:01

## 2018-01-10 RX ADMIN — METOPROLOL SUCCINATE 50 MG: 50 TABLET, EXTENDED RELEASE ORAL at 01:01

## 2018-01-10 RX ADMIN — ENOXAPARIN SODIUM 40 MG: 100 INJECTION SUBCUTANEOUS at 05:01

## 2018-01-10 NOTE — PLAN OF CARE
"TN spoke with NARDA Singh. She stated they received auth on patient. He will be leaving tomorrow. She will notify Dr. Henderson. TN left message for friend Susan. TN notified patient's brother, Mando. He was also in agreement.    TN updated patient. He stated, "ok."    Maureen Haney RN  Transition Navigator  [phone]    "

## 2018-01-10 NOTE — PROGRESS NOTES
The Sw asked Dr. Henderson to write the d/c orders b/c [facility]) states they need them to submit to [facility].

## 2018-01-10 NOTE — PLAN OF CARE
Problem: Patient Care Overview  Goal: Plan of Care Review  Outcome: Ongoing (interventions implemented as appropriate)  Plan of care reviewed with patient. Patient verbalized complete understanding. Fall precautions maintained. Bed in lowest position, locked, call light within reach and bed alarm is on. Side rails up x's 2 with slip resistant socks on. Nurse encouraged patient to turn to prevent skin breakdown.Nurse instructed patient to notify staff for any assistance and the patient verbalized complete understanding. Patient on telemetry throughout shift with no ectopy noted. Will continue to monitor.

## 2018-01-10 NOTE — PLAN OF CARE
Problem: SLP Goal  Goal: SLP Goal  Short Term Goals:  1. Patient will successfully participate in oqdkba-[id number]-dmyqsmnui evaluation to further assess for any communication impairments.  2. Pt will answer y/n complex questions with 90%.  3. Pt will recall events of the day with min A using compensatory memory strategies.  4. Pt will complete 3-4 word mental manipulation tasks with 80%.   5. Pt will complete category exclusion tasks with 80%.   6. Pt will complete time/money reasoning tasks with 80%.   7. Pt will state 3 physical/cognitive changes impacting safety/independence with ADLS with min A.      Outcome: Ongoing (interventions implemented as appropriate)  1/10/18: Pt seen this PM for speech-language therapy regarding cognitive-linguistic deficits. Pt with poor participation despite max cuing from SLP. Pt observed with flat affect, nonverbal, and appeared depressed during session. SLP spoke with TERI Mak following session and reported findings/outcomes. RN stated pt previously observed with one word responses and more interactive with RN. SLP will continue to follow.  JD Mills., CF-SLP  Speech-Language Pathologist

## 2018-01-10 NOTE — PLAN OF CARE
Has been awaiting placement for discharge, but suddenly developed high fever this afternoon.  Will get urinalysis, urine culture, blood cultures, influenza swab, and chest x-ray to evaluate.

## 2018-01-10 NOTE — PLAN OF CARE
[facility]  Manjit Farnsworth MD, [facility]     MD Lm Mosley FNP Renee Melancon, PA-C Rosanne Zeringue, NP  [address] [place] [id number]  Office: [phone]  Fax: [phone]       INPATIENT REHAB ORDERS    Patient Name: Spenser Rabago  YOB: 1960    1/11/2018    Admit to Inpatient Rehab                                                 Diagnoses:  Active Hospital Problems    Diagnosis  POA    Toxic effect of carbon monoxide, unintentional [T58.91XA]  Yes    Dehydration with hyponatremia [E87.1]  Yes    Right leg weakness [R29.898]  Yes    Essential hypertension [I10]  Yes     Chronic    Attention deficit hyperactivity disorder (ADHD) [F90.9]  Yes     Chronic    Depression [F32.9]  Yes    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, 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]  Yes     Chronic      Resolved Hospital Problems    Diagnosis Date Resolved POA    *Non-traumatic rhabdomyolysis [M62.82] 01/09/2018 Yes    Fall [W19.XXXA] 01/09/2018 Yes    Azotemia [R79.89] 01/09/2018 Yes    Abnormal liver enzymes [R74.8] 01/09/2018 Yes    Leukocytosis [D72.829] 01/08/2018 Yes    Metabolic acidosis [E87.2] 01/09/2018 Yes     Allergies:  Review of patient's allergies indicates:   Allergen Reactions    Penicillins Hives         Discharge Procedure Orders  Diet Adult Regular     Vital signs per facility protocol     Intake and output per facility protocol     Skin assessment every shift      Notify Physician   Order Specific Question Answer Comments   Temperature (F) greater than 101    Systolic Blood Pressure SBP greater than or equal to 160    Systolic Blood Pressure SBP less than or equal to 90    Diastolic Blood Pressure DBP greater than or equal to 110    Diastolic Blood Pressure DBP less than or equal to 60    Pulse greater than or equal to 120    Pulse less than or equal to 50    Respirations Rate RR greater than or equal to 30     Respirations Rate RR less than or equal to 6    Urine output less than 60 over 2 hours   SPO2% less than 90      Activity: Per rehab recommendations     Activity as tolerated     Full code     Pulse Oximetry           LABS:  Per facility protocol    Nursing Precautions:        - Fall precautions per nursing home protocol    CONSULTS:      Physical Therapy to evaluate and treat     Occupational Therapy to evaluate and treat      Medications: Discontinue all previous medication orders, if any. See new list below.   Spenser Rabago [facility] Medication Instructions ALNAA:[id number]    Printed on:01/10/18 1216   Medication Information                      metoprolol succinate (TOPROL-XL) 50 MG 24 hr tablet  Take 1 tablet (50 mg total) by mouth once daily.                       _________________________________  Carlos Henderson MD  1/11/2018

## 2018-01-10 NOTE — PROGRESS NOTES
The Sw spoke to Ravin at [facility] who states they received the d/c orders and they have submitted to [facility] and are awaiting the auth before they can admit the pt.

## 2018-01-10 NOTE — PT/OT/SLP PROGRESS
Physical Therapy Treatment    Patient Name:  Spenser Rabago   MRN:  [id number]    Recommendations:     Discharge Recommendations:  rehabilitation facility   Discharge Equipment Recommendations:  (Defer to IPR)   Barriers to discharge: Inaccessible home and Decreased caregiver support    Assessment:     Spenser Rabago is a 57 y.o. male admitted with a medical diagnosis of Non-traumatic rhabdomyolysis.  He presents with the following impairments/functional limitations:  weakness, impaired endurance, impaired self care skills, impaired functional mobilty, decreased coordination, impaired balance, gait instability, decreased upper extremity function, decreased lower extremity function, decreased safety awareness, pain, edema, impaired fine motor, impaired coordination. Pt with improved gait distance today, pt easily fatigues.     Rehab Prognosis:  good; patient would benefit from acute skilled PT services to address these deficits and reach maximum level of function.      Recent Surgery: * No surgery found *      Plan:     During this hospitalization, patient to be seen 6 x/week to address the above listed problems via gait training, therapeutic activities, therapeutic exercises, neuromuscular re-education  · Plan of Care Expires:  02/04/18   Plan of Care Reviewed with: patient    Subjective     Communicated with TERI Mak prior to session.  Patient found supine upon PT entry to room, agreeable to treatment.      Chief Complaint: being cold, weakness  Patient comments/goals: improve functional mobility.   Pain/Comfort:  · Pain Rating 1:  (pt reported pain but did not rate or state where. )    Patients cultural, spiritual, Anabaptism conflicts given the current situation: None verbalized to PT    Objective:     Patient found with: bed alarm, pressure relief boots     General Precautions: Standard, fall   Orthopedic Precautions:N/A   Braces: N/A     Functional Mobility:  · Bed Mobility:     · Rolling Left:  contact  guard assistance  · Rolling Right: contact guard assistance  · Scooting: pt able to scoot about 50% towards EOB, Max A to get fully to EOB  · Supine to Sit: minimum assistance  · Sit to Supine: minimum assistance  · Transfers:     · Sit to Stand:  contact guard assistance and minimum assistance with rolling walker  · Gait: 46 feet  · Balance: Sitting balance: fair, Standing balance: poor      AM-PAC 6 CLICK MOBILITY  Turning over in bed (including adjusting bedclothes, sheets and blankets)?: 3  Sitting down on and standing up from a chair with arms (e.g., wheelchair, bedside commode, etc.): 3  Moving from lying on back to sitting on the side of the bed?: 3  Moving to and from a bed to a chair (including a wheelchair)?: 3  Need to walk in hospital room?: 3  Climbing 3-5 steps with a railing?: 1  Total Score: 16       Therapeutic Activities and Exercises:   -Pt was shivering and stated he has been for the past 3 hours. Pt's temperature was 98.1.  -Bed mobility as listed above.  -Pt ambulated 46 feet with RW and  Min A 2* to posterior lean and to maintain balance. Max A to maneuver Rw.  VC's to slow cecilia and increase step length.  Pt ambulates with R foot in front of L with decreased clearance from floor R > L. Pt fatigues quickly and demonstrates increased toe drag after ~36 feet. Pt required standing rest break before ambulating final 10 feet to EOB.   -Sit>stand with RW and CGA pt took 4 side steps towards EOB.       Patient left HOB elevated with all lines intact, call button in reach, bed alarm on, TERI Mak notified and pressure relieving boots..    GOALS:    Physical Therapy Goals        Problem: Physical Therapy Goal    Goal Priority Disciplines Outcome Goal Variances Interventions   Physical Therapy Goal     PT/OT, PT Ongoing (interventions implemented as appropriate)     Description:  Goals to be met by: 18     Patient will increase functional independence with mobility by performin. Supine to  sit with Stand-by Assistance  2. Sit to supine with Stand-by Assistance MET 1/7/2018  3. Sit to stand transfer with Moderate Assistance MET 1/7/2018  4. Bed to chair transfer with Moderate Assistance using least restirctive AD MET 1/7/2018  5.  Assess gait     Updated Goals:  1. Supine<>sit with SBA  2. Sit to stand to SBA with RW  3. Bed to chair transfers with RW and CGA  4. Gait x 20 ft with RW and CGA                       Time Tracking:     PT Received On: 01/10/18  PT Start Time: 1126     PT Stop Time: 1152  PT Total Time (min): 26 min     Billable Minutes: Gait Training 16 and Therapeutic Activity 10    Treatment Type: Treatment  PT/PTA: PT     PTA Visit Number: 0     Shola Posadas PT, DPT  01/10/2018

## 2018-01-10 NOTE — PLAN OF CARE
TN informed Dr. Henderson neurology recommending and that patient should be followed by/seen by a psych MD. He stated ok for patient to follow-up with his psych doctor on discharge.     TN went to meet with patient. TN informed Dr. Henderson patient is different than yesterday. (Nurse Obdulio stated patient is a little different from yesterday). He did not answer questions when spoken too. Dr Henderson reports patient has a history of depression. TN updated patient that we are waiting on insurance authorization for [facility] [place] [facility]. TN also inquired with patient who his psychiatric doctor is, so follow-up can be scheduled. (TN called patient's neurologist, and they were unable to provide information). Patient would not answer TN or provide any information. TN will have Dr. Henderson write referral to schedule patient.    TN reviewed past charts. Per Dr. Lancaster's note, continue to follow-up with Dr. Hoa Chung (Psychiatry).--TN called Dr. Chung's office, voicemail picked up stating closed until Jan 9, will reopen Ramo 10. TN called Dr. Chung's office again, no response, message left.    Maureen Haney RN  Transition Navigator  [phone]

## 2018-01-10 NOTE — PROGRESS NOTES
The Sw received a call from Yovani([phone])at [facility] in [place] stating she's in route to come and assess the pt for their facility. The Sw spoke to the pt's friend Susan([phone])who states she's agreeable with [facility] b/c it's near her and the pt's brother's home. Yovani states she will reach out to Susan and the pt's brother.     11:15am The Sw spoke to Yovani from [facility] and she just evaluated the pt and states they have medically accepted the pt and she states she will submit to [facility] today to get the IPR auth for the pt b/c Dr. Henderson states the pt's medically stable for d/c. Yovani states as soon as she get the auth from [facility] they will be able to admit the pt. Yovani spoke to Susan and informed her of the above mentioned info and they are in agreement with the d/c plan.

## 2018-01-10 NOTE — PROGRESS NOTES
The Sw received a call from Noon at [facility] stating she just received the auth from [facility] but she can't accept the pt until tomorrow due to the time. The Sw informed Maureen and Dr. Henderson of this info and he will change the date on the d/c orders.

## 2018-01-10 NOTE — PROGRESS NOTES
The Sw faxed the d/c orders to Yovani at [facility] via [facility]. The Sw informed the pt's nurse of the above mentioned info.

## 2018-01-10 NOTE — PROGRESS NOTES
The Sw faxed the corrected d/c orders to [place] via [facility].

## 2018-01-10 NOTE — PLAN OF CARE
Problem: Physical Therapy Goal  Goal: Physical Therapy Goal  Goals to be met by: 18     Patient will increase functional independence with mobility by performin. Supine to sit with Stand-by Assistance  2. Sit to supine with Stand-by Assistance MET 2018  3. Sit to stand transfer with Moderate Assistance MET 2018  4. Bed to chair transfer with Moderate Assistance using least restirctive AD MET 2018  5.  Assess gait     Updated Goals:  1. Supine<>sit with SBA  2. Sit to stand to SBA with RW  3. Bed to chair transfers with RW and CGA  4. Gait x 20 ft with RW and CGA      Outcome: Ongoing (interventions implemented as appropriate)  Pt progressing towards goals PT to follow.

## 2018-01-10 NOTE — PLAN OF CARE
Problem: Occupational Therapy Goal  Goal: Occupational Therapy Goal  Goals to be met by: 2/5     Patient will increase functional independence with ADLs by performing:    UE Dressing with Set-up Assistance.  Grooming while standing at sink with Moderate Assistance.  Toileting from toilet with Moderate Assistance for hygiene and clothing management.   Supine to sit with Stand-by Assistance.  Toilet transfer to toilet with Moderate Assistance.  Increased functional strength to 4/5 for BUE.     Outcome: Ongoing (interventions implemented as appropriate)  Patient remains with flat, depressed affect. Still requiring increased time and VCs for initiation of all tasks. Patient remains highly appropriate for IPR to address functional deficits and improve performance of ADL tasks.

## 2018-01-10 NOTE — PLAN OF CARE
Problem: Physical Therapy Goal  Goal: Physical Therapy Goal  Goals to be met by: 18     Patient will increase functional independence with mobility by performin. Supine to sit with Stand-by Assistance  2. Sit to supine with Stand-by Assistance MET 2018  3. Sit to stand transfer with Moderate Assistance MET 2018  4. Bed to chair transfer with Moderate Assistance using least restirctive AD MET 2018  5.  Assess gait     Updated Goals:  1. Supine<>sit with SBA  2. Sit to stand to SBA with RW  3. Bed to chair transfers with RW and CGA  4. Gait x 20 ft with RW and CGA      Outcome: Ongoing (interventions implemented as appropriate)  Continue working toward goals.

## 2018-01-10 NOTE — SUBJECTIVE & OBJECTIVE
Interval History: No acute events.    Review of Systems   Constitutional: Negative for chills and fever.   Respiratory: Negative for cough and shortness of breath.    Cardiovascular: Negative for chest pain and palpitations.   Gastrointestinal: Negative for nausea and vomiting.     Objective:     Vital Signs (Most Recent):  Temp: 98.2 °F (36.8 °C) (01/10/18 0733)  Pulse: 105 (01/10/18 0900)  Resp: 18 (01/10/18 0733)  BP: (!) 131/93 (01/10/18 0733)  SpO2: 98 % (01/10/18 0946) Vital Signs (24h Range):  Temp:  [97.4 °F (36.3 °C)-98.8 °F (37.1 °C)] 98.2 °F (36.8 °C)  Pulse:  [] 105  Resp:  [12-19] 18  SpO2:  [96 %-98 %] 98 %  BP: (125-132)/(82-93) 131/93     Weight: 74.9 kg (165 lb 2 oz)  Body mass index is 28.34 kg/m².    Intake/Output Summary (Last 24 hours) at 01/10/18 1151  Last data filed at 01/10/18 0600   Gross per 24 hour   Intake              450 ml   Output             1503 ml   Net            -1053 ml      Physical Exam   Constitutional: He is oriented to person, place, and time. He appears well-developed and well-nourished. No distress.   Neurological: He is alert and oriented to person, place, and time.   Psychiatric: His affect is blunt. He is slowed. He exhibits a depressed mood.   Nursing note and vitals reviewed.      Significant Labs:   CBC: No results for input(s): WBC, HGB, HCT, PLT in the last 48 hours.  CMP:     Recent Labs  Lab 01/09/18  0700 01/10/18  0425   * 130*   K 4.0 4.3   CL 98 96   CO2 22* 23   * 104   BUN 14 14   CREATININE 0.8 0.7   CALCIUM 8.5* 8.8   ANIONGAP 9 11   EGFRNONAA >60 >60     Magnesium:     Recent Labs  Lab 01/10/18  0424   MG 1.7       Significant Imaging: I have reviewed all pertinent imaging results/findings within the past 24 hours.

## 2018-01-10 NOTE — PT/OT/SLP PROGRESS
Physical Therapy Treatment    Patient Name:  Spenser Rabago   MRN:  [id number]    Recommendations:     Discharge Recommendations:  rehabilitation facility   Discharge Equipment Recommendations:  (defer to IPR)   Barriers to discharge: Inaccessible home and Decreased caregiver support    Assessment:     Spenser Rabago is a 57 y.o. male admitted with a medical diagnosis of Non-traumatic rhabdomyolysis.  He presents with the following impairments/functional limitations:  weakness, impaired endurance, impaired self care skills, impaired functional mobilty, impaired balance, gait instability, decreased upper extremity function, decreased lower extremity function, decreased safety awareness, impaired fine motor, decreased ROM.  Pt demomonstrated an increase in ambulation distance today taking short fast step R out in front of left.  Verbal cues and assistance for increased step length and to slow down cecilia, but pt unable to perform.  Pt would continue to benefit from P.T. To address impairments listed above.    Rehab Prognosis:  Good; patient would benefit from acute skilled PT services to address these deficits and reach maximum level of function.      Recent Surgery: * No surgery found *      Plan:     During this hospitalization, patient to be seen 6 x/week to address the above listed problems via gait training, therapeutic activities, therapeutic exercises, neuromuscular re-education  · Plan of Care Expires:  02/04/18   Plan of Care Reviewed with: patient    Subjective     Communicated with RN prior to session.  Patient found supine upon PT entry to room, agreeable to treatment.        Patient comments:  PT agreed to tx.  Pain/Comfort:  · Pain Rating 1: 0/10  · Pain Rating Post-Intervention 1: 0/10    Patients cultural, spiritual, Jew conflicts given the current situation: None  verbalized to PT    Objective:     Patient found with: bed alarm, telemetry     General Precautions: Standard, fall    Orthopedic Precautions:N/A   Braces:       Functional Mobility:  · Bed Mobility:     · Rolling Right: stand by assistance  · Scooting: stand by assistance and use of b/r  · Supine to Sit: minimum assistance  · Sit to Supine: minimum assistance and LE  · Transfers:     · Sit to Stand:  contact guard assistance and minimum assistance with rolling walker  · Gait: 36ft with Rw and Tye secondary to posterior lean.  VC's to slow cecilia and increase step length.  Pt ambulates with R foot in front of L with decreased clearance from floor R > L      AM-PAC 6 CLICK MOBILITY  Turning over in bed (including adjusting bedclothes, sheets and blankets)?: 3  Sitting down on and standing up from a chair with arms (e.g., wheelchair, bedside commode, etc.): 3  Moving from lying on back to sitting on the side of the bed?: 3  Moving to and from a bed to a chair (including a wheelchair)?: 3  Need to walk in hospital room?: 3  Climbing 3-5 steps with a railing?: 1  Total Score: 16       Therapeutic Activities and Exercises:   Seated BLE therex x 15 reps with vc's and assistance.    Patient left supine with all lines intact, call button in reach, bed alarm on and RN notified..    GOALS:    Physical Therapy Goals        Problem: Physical Therapy Goal    Goal Priority Disciplines Outcome Goal Variances Interventions   Physical Therapy Goal     PT/OT, PT Ongoing (interventions implemented as appropriate)     Description:  Goals to be met by: 18     Patient will increase functional independence with mobility by performin. Supine to sit with Stand-by Assistance  2. Sit to supine with Stand-by Assistance MET 2018  3. Sit to stand transfer with Moderate Assistance MET 2018  4. Bed to chair transfer with Moderate Assistance using least restirctive AD MET 2018  5.  Assess gait     Updated Goals:  1. Supine<>sit with SBA  2. Sit to stand to SBA with RW  3. Bed to chair transfers with RW and CGA  4. Gait x 20 ft with RW  and CGA                       Time Tracking:     PT Received On: 01/09/18  PT Start Time: 1345     PT Stop Time: 1412  PT Total Time (min): 27 min     Billable Minutes: Gait Training 10 and Therapeutic Activity 17    Treatment Type: Treatment  PT/PTA: PTA     PTA Visit Number: 1     Giuliana Odonnell PTA  01/10/2018

## 2018-01-10 NOTE — PT/OT/SLP PROGRESS
Occupational Therapy   Treatment    Name: Spenser Rabago  MRN: [id number]  Admitting Diagnosis:  Non-traumatic rhabdomyolysis       Recommendations:     Discharge Recommendations: rehabilitation facility  Discharge Equipment Recommendations:   (defer to IPR)  Barriers to discharge:  Inaccessible home environment    Subjective     Communicated with: nurseObdulio prior to session.  Pain/Comfort:  · Pain Rating 1:  (pain in L leg, but did not rate)    Objective:     Patient found with: bed alarm    General Precautions: Standard, fall   Orthopedic Precautions:N/A   Braces: N/A     Bed Mobility:    · Patient completed Scooting/Bridging with contact guard assistance  · Patient completed Supine to Sit with moderate assistance, with side rail and from flat surface  · Patient completed Sit to Supine with stand by assistance     Functional Mobility/Transfers:  · Patient completed Sit <> Stand Transfer with contact guard assistance  with  rolling walker   · Patient completed Bed <> Chair Transfer using Step Transfer technique with contact guard assistance and minimum assistance with rolling walker    Activities of Daily Living:  · Grooming: minimum assistance    · Bathing: moderate assistance    · UB Dressing: contact guard assistance    · Toileting: total assistance      Patient left HOB elevated with all lines intact, call button in reach, bed alarm on and RN notified    [facility] 6 Click:  [facility] Total Score: 15    Treatment & Education:  Patient with bed mob as noted above. Required increased time and VCs to initiate sup > sit. Patient's L hand still edematous, but less so than previous visit. Patient's gown and linens noted to be saturated with urine. Patient states he is incontinent -- educated on importance of calling staff after urinary/bowel accidents. Patient doffed soiled gown and participated in bed bath EOB with mod (A) and increased time 2* poor initiation and following of commands. Patient donned clean gown and  stood with SBA-CGA. Patient stepped 2-3 ft to bedside chair using RW. COmpleted G/H tasks while in bedside chair. PCT present for bed linen change. Patient CGA to return to EOB with RW. Returned supine with SBA and required SBA-CGA with VCs and bed in trendelenburg to scoot to HOB using BUEs on head rail.   Education:    Assessment:   Patient remains with flat, depressed affect. Still requiring increased time and VCs for initiation of all tasks. Patient remains highly appropriate for IPR to address functional deficits and improve performance of ADL tasks.     Spenser Rabago is a 57 y.o. male with a medical diagnosis of Non-traumatic rhabdomyolysis.  Performance deficits affecting function are weakness, impaired endurance, impaired self care skills, impaired functional mobilty, gait instability, impaired balance, impaired cognition, decreased safety awareness, decreased lower extremity function, decreased upper extremity function, decreased ROM, impaired skin, edema, decreased coordination.      Rehab Prognosis:  Good; patient would benefit from acute skilled OT services to address these deficits and reach maximum level of function.       Plan:     Patient to be seen 5 x/week to address the above listed problems via self-care/home management, therapeutic activities, therapeutic exercises  · Plan of Care Expires: 02/05/18  · Plan of Care Reviewed with: patient    This Plan of care has been discussed with the patient who was involved in its development and understands and is in agreement with the identified goals and treatment plan    GOALS:    Occupational Therapy Goals        Problem: Occupational Therapy Goal    Goal Priority Disciplines Outcome Interventions   Occupational Therapy Goal     OT, PT/OT Ongoing (interventions implemented as appropriate)    Description:  Goals to be met by: 2/5     Patient will increase functional independence with ADLs by performing:    UE Dressing with Set-up Assistance.  Grooming  while standing at sink with Moderate Assistance.  Toileting from toilet with Moderate Assistance for hygiene and clothing management.   Supine to sit with Stand-by Assistance.  Toilet transfer to toilet with Moderate Assistance.  Increased functional strength to 4/5 for BUE.                      Time Tracking:     OT Date of Treatment: 01/10/18  OT Start Time: 0940  OT Stop Time: 1035  OT Total Time (min): 55 min    Billable Minutes:Self Care/Home Management 30  Therapeutic Activity 25    TING Garay  1/10/2018

## 2018-01-10 NOTE — PT/OT/SLP PROGRESS
"Speech Language Pathology Treatment    Patient Name:  Spenser Rabago   MRN:  [id number]  Admitting Diagnosis: Non-traumatic rhabdomyolysis    Recommendations:                 General Recommendations:  Cognitive-linguistic therapy  Diet recommendations:  Regular, Liquid Diet Level: Thin   Aspiration Precautions: Standard aspiration precautions   General Precautions: Standard, fall  Communication strategies:  provide increased time to answer    Subjective     Pt found in bed upon SLP entry. Pt was awake. SLP cued pt to oriented to name, However, pt was observed to stare at SLP and with flat affect. Pt was observed to be nonverbal throughout session.    Patient goals: None stated by pt.     Objective:     Has the patient been evaluated by SLP for swallowing?   Yes  Keep patient NPO? No   Current Respiratory Status: room air      Pt seen this PM for speech-language therapy regarding cognitive-linguistic deficits. Pt with poor participation despite max cuing (repetitions, redirection, and verbal cues) from SLP. Pt observed with flat affect, nonverbal, and appeared depressed during session. SLP cued pt to state name. Pt was observed with a blank stare looking at SLP. SLP cued pt to orient to first name only. Again, pt observed with blank stare. SLP cued pt with binary choice to orient to first name. Pt observed to turn head and look elsewhere in room. SLP asked pt if he felt okay. Pt was observed with head nod, "yes." SLP cued pt to answer Y/N questions. Pt answered first Y/N questions with a head nod "yes". However, pt did not answer next two Y/N questions and was observed with flat affect/blank stare again. SLP asked pt if he would like to be left alone and pt closed eyes and nodded head "yes".    SLP spoke with TERI Mak following session and reported findings/outcomes. RN stated pt previously observed with one word responses and more interactive with RN. SLP will continue to follow.    Assessment:     Spenser Clarke " Hima is a 57 y.o. male with an SLP diagnosis of Cognitive-Linguistic deficits.  He presents with f;at affect, is nonverbal, and poor participation during today's session, as compared to previous session. SLP will continue to follow.  Pt would benefit from ST services at next level of care to address pt's cognitive-linguistic deficits.    Goals:    SLP Goals        Problem: SLP Goal    Goal Priority Disciplines Outcome   SLP Goal     SLP Ongoing (interventions implemented as appropriate)   Description:  Short Term Goals:  1. Patient will successfully participate in siqbjf-zyajivla-[id number] evaluation to further assess for any communication impairments.  2. Pt will answer y/n complex questions with 90%.  3. Pt will recall events of the day with min A using compensatory memory strategies.  4. Pt will complete 3-4 word mental manipulation tasks with 80%.   5. Pt will complete category exclusion tasks with 80%.   6. Pt will complete time/money reasoning tasks with 80%.   7. Pt will state 3 physical/cognitive changes impacting safety/independence with ADLS with min A.                       Plan:     · Patient to be seen:  3 x/week   · Plan of Care expires:  02/07/18  · Plan of Care reviewed with:  patient (TERI Mak)   · SLP Follow-Up:  Yes       Discharge recommendations:  rehabilitation facility   Barriers to Discharge:  None    Time Tracking:     SLP Treatment Date:   01/10/18  Speech Start Time:  1300  Speech Stop Time:  1312     Speech Total Time (min):  12 min    Billable Minutes: Seld Care/Home Management Training 12    NYDIA Mills, CF-SLP  Speech-Language Pathologist   1/10/2018

## 2018-01-10 NOTE — PLAN OF CARE
Future Appointments  Date Time Provider Department Center   2/2/2018 3:40 PM Frank Lancaster MD [facility]     Follow-up With  Details  Why  Contact Info   Vladimir Apple MD    after inpatient rehab stay  [address]  [address]  [place] [address]  [phone]   [facility]    [facility]     Dr. Franki Lancaster  On 2/2/2018  at 3:40 pm--Neurology Follow-Up  [address]  [address]  [place] [address]  [phone]       Maureen Haney RN  Transition Navigator  [phone]

## 2018-01-10 NOTE — PROGRESS NOTES
[facility] Hospital Medicine  Progress Note    Patient Name: Spenser Rabago  MRN: [id number]  Patient Class: IP- Inpatient   Admission Date: 1/5/2018  Length of Stay: 5 days  Attending Physician: Carlos Henderson MD  Primary Care Provider: Vladimir Apple MD        Subjective:     Principal Problem:Non-traumatic rhabdomyolysis    HPI:  Spenser Rabago is a 57 y.o. white man with hypertension, migraine headaches, attention deficit hyperactivity disorder, and depression.  He lives in [place], [place].  His primary care physician is Dr. Vladimir Apple.  His neurologist is Dr. Frank Lancaster.   He was taken by EMS to [facility] on 1/5/18 after falling and being on the floor for at least 6 hours.  Four days prior to this, a box fell on his right foot and he had pain and difficulty walking.  He was falling a lot after that.  He also had decreased appetite and oral intake the past week.  He was found to have dehydration (sodium 127, BUN 47, urine with ketones and hyaline casts), high anion gap acidosis (bicarbonate 18, anion gap 20), and rhabdomyolysis (CPK 1759).  He was admitted to [facility].    Hospital Course:  Rhabdomyolysis resolved with IV fluids.  He was found to have right greater than left lower extremity weakness.  MRI showed bilateral hyperintensity in the globus pallidi and throughout the supratentorial white matter.  Neurology was concerned for carbon monoxide exposure/toxicity.  His carboxyhemoglobin was within normal limits.  Neurology recommended hyperbaric oxygen therapy, but director of hyperbarics Dr. Alaina Kuo said that was only indicated in acute CO poisoning.  His family went to his house and found black mold in the vents.  Inpatient rehab was sought and he waited around for a few days.    Interval History: No acute events.    Review of Systems   Constitutional: Negative for chills and fever.   Respiratory: Negative for cough and  shortness of breath.    Cardiovascular: Negative for chest pain and palpitations.   Gastrointestinal: Negative for nausea and vomiting.     Objective:     Vital Signs (Most Recent):  Temp: 98.2 °F (36.8 °C) (01/10/18 0733)  Pulse: 105 (01/10/18 0900)  Resp: 18 (01/10/18 0733)  BP: (!) 131/93 (01/10/18 0733)  SpO2: 98 % (01/10/18 0946) Vital Signs (24h Range):  Temp:  [97.4 °F (36.3 °C)-98.8 °F (37.1 °C)] 98.2 °F (36.8 °C)  Pulse:  [] 105  Resp:  [12-19] 18  SpO2:  [96 %-98 %] 98 %  BP: (125-132)/(82-93) 131/93     Weight: 74.9 kg (165 lb 2 oz)  Body mass index is 28.34 kg/m².    Intake/Output Summary (Last 24 hours) at 01/10/18 1151  Last data filed at 01/10/18 0600   Gross per 24 hour   Intake              450 ml   Output             1503 ml   Net            -1053 ml      Physical Exam   Constitutional: He is oriented to person, place, and time. He appears well-developed and well-nourished. No distress.   Neurological: He is alert and oriented to person, place, and time.   Psychiatric: His affect is blunt. He is slowed. He exhibits a depressed mood.   Nursing note and vitals reviewed.      Significant Labs:   CBC: No results for input(s): WBC, HGB, HCT, PLT in the last 48 hours.  CMP:     Recent Labs  Lab 01/09/18  0700 01/10/18  0425   * 130*   K 4.0 4.3   CL 98 96   CO2 22* 23   * 104   BUN 14 14   CREATININE 0.8 0.7   CALCIUM 8.5* 8.8   ANIONGAP 9 11   EGFRNONAA >60 >60     Magnesium:     Recent Labs  Lab 01/10/18  0424   MG 1.7       Significant Imaging: I have reviewed all pertinent imaging results/findings within the past 24 hours.     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  Appreciate Neurology.  Hyperbaric oxygen therapy not indicated per Dr. Kuo.  Inpatient rehab.        Attention deficit hyperactivity disorder (ADHD)    Holding Adderall for now.         Essential hypertension    Resume home Toprol  XL.  Holding lisinopril.  Monitor.         Depression    Will resume trazodone and Wellbutrin in coming days.           Mixed migraine and muscle contraction headache    Currently has alprazolam, baclofen, Percocet, Opana ER, tizanadine, and topiramate on his med list in our system.  He could not confirm medications at this time.  His primary care physician and neurologist are [facility] so should be correct.  Not complaining of any headaches or other pain at this time.           VTE Risk Mitigation         Ordered     enoxaparin injection 40 mg  Daily     Route:  Subcutaneous        01/05/18 1000     High Risk of VTE  Once      01/05/18 1000     Reason for No Pharmacological VTE Prophylaxis  Once      01/05/18 1000     Place sequential compression device  Until discontinued      01/05/18 1000        Disposition: Awaiting inpatient rehab acceptance.      Carlos Henderson MD  Department of Hospital Medicine   [facility]

## 2018-01-11 PROBLEM — L03.114 CELLULITIS OF LEFT HAND: Status: ACTIVE | Noted: 2018-01-11

## 2018-01-11 PROBLEM — B95.8 BACTEREMIA DUE TO STAPHYLOCOCCUS: Status: ACTIVE | Noted: 2018-01-11

## 2018-01-11 PROBLEM — R78.81 BACTEREMIA DUE TO STAPHYLOCOCCUS: Status: ACTIVE | Noted: 2018-01-11

## 2018-01-11 LAB
BACTERIA #/AREA URNS HPF: NORMAL /HPF
BILIRUB UR QL STRIP: NEGATIVE
BILIRUB UR QL STRIP: NEGATIVE
CK SERPL-CCNC: 71 U/L
CLARITY UR: CLEAR
CLARITY UR: CLEAR
COLOR UR: YELLOW
COLOR UR: YELLOW
GLUCOSE UR QL STRIP: ABNORMAL
GLUCOSE UR QL STRIP: ABNORMAL
HGB UR QL STRIP: NEGATIVE
HGB UR QL STRIP: NEGATIVE
HYALINE CASTS #/AREA URNS LPF: 0 /LPF
KETONES UR QL STRIP: NEGATIVE
KETONES UR QL STRIP: NEGATIVE
LEUKOCYTE ESTERASE UR QL STRIP: NEGATIVE
LEUKOCYTE ESTERASE UR QL STRIP: NEGATIVE
MICROSCOPIC COMMENT: NORMAL
NITRITE UR QL STRIP: NEGATIVE
NITRITE UR QL STRIP: NEGATIVE
PH UR STRIP: 7 [PH] (ref 5–8)
PH UR STRIP: 7 [PH] (ref 5–8)
PROCALCITONIN SERPL IA-MCNC: 0.55 NG/ML
PROT UR QL STRIP: ABNORMAL
PROT UR QL STRIP: ABNORMAL
RBC #/AREA URNS HPF: 3 /HPF (ref 0–4)
SP GR UR STRIP: 1.02 (ref 1–1.03)
SP GR UR STRIP: 1.02 (ref 1–1.03)
SQUAMOUS #/AREA URNS HPF: NORMAL /HPF
URN SPEC COLLECT METH UR: ABNORMAL
URN SPEC COLLECT METH UR: ABNORMAL
UROBILINOGEN UR STRIP-ACNC: 1 EU/DL
UROBILINOGEN UR STRIP-ACNC: 1 EU/DL
WBC #/AREA URNS HPF: 2 /HPF (ref 0–5)

## 2018-01-11 PROCEDURE — A4216 STERILE WATER/SALINE, 10 ML: HCPCS | Performed by: HOSPITALIST

## 2018-01-11 PROCEDURE — 81000 URINALYSIS NONAUTO W/SCOPE: CPT

## 2018-01-11 PROCEDURE — 97116 GAIT TRAINING THERAPY: CPT

## 2018-01-11 PROCEDURE — 63600175 PHARM REV CODE 636 W HCPCS: Performed by: HOSPITALIST

## 2018-01-11 PROCEDURE — 97110 THERAPEUTIC EXERCISES: CPT

## 2018-01-11 PROCEDURE — 97530 THERAPEUTIC ACTIVITIES: CPT

## 2018-01-11 PROCEDURE — 82550 ASSAY OF CK (CPK): CPT

## 2018-01-11 PROCEDURE — 87040 BLOOD CULTURE FOR BACTERIA: CPT

## 2018-01-11 PROCEDURE — 84145 PROCALCITONIN (PCT): CPT

## 2018-01-11 PROCEDURE — 87086 URINE CULTURE/COLONY COUNT: CPT

## 2018-01-11 PROCEDURE — 11000001 HC ACUTE MED/SURG PRIVATE ROOM

## 2018-01-11 PROCEDURE — 25000003 PHARM REV CODE 250: Performed by: HOSPITALIST

## 2018-01-11 PROCEDURE — 36415 COLL VENOUS BLD VENIPUNCTURE: CPT

## 2018-01-11 PROCEDURE — 97535 SELF CARE MNGMENT TRAINING: CPT

## 2018-01-11 RX ORDER — DOXYCYCLINE HYCLATE 100 MG
100 TABLET ORAL EVERY 12 HOURS
Status: DISCONTINUED | OUTPATIENT
Start: 2018-01-11 | End: 2018-01-12

## 2018-01-11 RX ADMIN — THERA TABS 1 TABLET: TAB at 09:01

## 2018-01-11 RX ADMIN — SODIUM CHLORIDE TAB 1 GM 1 G: 1 TAB at 09:01

## 2018-01-11 RX ADMIN — ENOXAPARIN SODIUM 40 MG: 100 INJECTION SUBCUTANEOUS at 05:01

## 2018-01-11 RX ADMIN — DOXYCYCLINE HYCLATE 100 MG: 100 TABLET, COATED ORAL at 08:01

## 2018-01-11 RX ADMIN — METOPROLOL SUCCINATE 50 MG: 50 TABLET, EXTENDED RELEASE ORAL at 09:01

## 2018-01-11 RX ADMIN — DOXYCYCLINE HYCLATE 100 MG: 100 TABLET, COATED ORAL at 12:01

## 2018-01-11 RX ADMIN — SODIUM CHLORIDE, PRESERVATIVE FREE 3 ML: 5 INJECTION INTRAVENOUS at 05:01

## 2018-01-11 RX ADMIN — ASPIRIN 81 MG: 81 TABLET, COATED ORAL at 09:01

## 2018-01-11 RX ADMIN — SODIUM CHLORIDE, PRESERVATIVE FREE 3 ML: 5 INJECTION INTRAVENOUS at 02:01

## 2018-01-11 RX ADMIN — SODIUM CHLORIDE TAB 1 GM 1 G: 1 TAB at 08:01

## 2018-01-11 RX ADMIN — SODIUM CHLORIDE, PRESERVATIVE FREE 3 ML: 5 INJECTION INTRAVENOUS at 11:01

## 2018-01-11 RX ADMIN — VANCOMYCIN HYDROCHLORIDE 1500 MG: 10 INJECTION, POWDER, LYOPHILIZED, FOR SOLUTION INTRAVENOUS at 06:01

## 2018-01-11 NOTE — SUBJECTIVE & OBJECTIVE
Interval History: No acute events.    Review of Systems   Respiratory: Positive for cough. Negative for shortness of breath.    Cardiovascular: Negative for chest pain and palpitations.   Gastrointestinal: Negative for nausea and vomiting.     Objective:     Vital Signs (Most Recent):  Temp: 99.1 °F (37.3 °C) (01/11/18 0727)  Pulse: 101 (01/11/18 0727)  Resp: 18 (01/11/18 0727)  BP: 122/82 (01/11/18 0727)  SpO2: 98 % (01/10/18 0946) Vital Signs (24h Range):  Temp:  [98.4 °F (36.9 °C)-102.9 °F (39.4 °C)] 99.1 °F (37.3 °C)  Pulse:  [101-124] 101  Resp:  [18-20] 18  BP: (122-164)/() 122/82     Weight: 74.9 kg (165 lb 2 oz)  Body mass index is 28.34 kg/m².    Intake/Output Summary (Last 24 hours) at 01/11/18 1059  Last data filed at 01/11/18 0600   Gross per 24 hour   Intake              240 ml   Output             1583 ml   Net            -1343 ml      Physical Exam   Constitutional: He is oriented to person, place, and time. He appears well-developed and well-nourished. No distress.   Pulmonary/Chest: Effort normal. No respiratory distress.   Musculoskeletal:   Left hand swelling as described in hospital course   Neurological: He is alert and oriented to person, place, and time.   Skin: Skin is warm. There is erythema.   Psychiatric: His affect is blunt. He is slowed. He does not exhibit a depressed mood.   Nursing note and vitals reviewed.      Significant Labs:   CBC:     Recent Labs  Lab 01/10/18  1742   WBC 16.35*   HGB 14.3   HCT 41.5   *     CMP:     Recent Labs  Lab 01/10/18  0425   *   K 4.3   CL 96   CO2 23      BUN 14   CREATININE 0.7   CALCIUM 8.8   ANIONGAP 11   EGFRNONAA >60     Magnesium:     Recent Labs  Lab 01/10/18  0424   MG 1.7       Significant Imaging: I have reviewed all pertinent imaging results/findings within the past 24 hours.

## 2018-01-11 NOTE — PT/OT/SLP PROGRESS
Occupational Therapy   Treatment    Name: Spenser Rabago  MRN: [id number]  Admitting Diagnosis:  Non-traumatic rhabdomyolysis       Recommendations:     Discharge Recommendations: rehabilitation facility  Discharge Equipment Recommendations:  other (see comments) (defer to IPR)  Barriers to discharge:  Decreased caregiver support    Subjective     Communicated with: nurse prior to session.  Pain/Comfort:  · Pain Rating 1:  (did not rate)  · Location - Side 1: Bilateral  · Location - Orientation 1: generalized  · Location 1: foot  · Pain Addressed 1: Reposition, Distraction, Nurse notified    Objective:     Patient found with: bed alarm, peripheral IV, pressure relief boots    General Precautions: Standard, fall   Orthopedic Precautions:N/A   Braces:       Occupational Performance:    Bed Mobility:    · Patient completed Rolling/Turning to Right with minimum assistance  · Patient completed Scooting/Bridging with minimum assistance and moderate assistance  · Patient completed Supine to Sit with minimum assistance and moderate assistance  · Patient completed Sit to Supine with minimum assistance     Functional Mobility/Transfers:  · Patient completed Sit <> Stand Transfer with minimum assistance  with  rolling walker   · Patient completed Bed <> Chair Transfer using Step Transfer technique with minimum assistance and moderate assistance with no assistive device    Activities of Daily Living:  · Feeding:  stand by assistance assist to cut meat and open packages  · Grooming: stand by assistance to open toothpaste  · Toileting: dependence post incontinent bladder episode    Patient left HOB elevated with all lines intact, call button in reach, bed alarm on and nurse notified    AMPA 6 Click:  AMPAC Total Score: 15    Treatment & Education:  Pt requires increased cues to participate in activity;continues w/flat affect.  Moved sit to stand 3 trials min A, static standing min A w/RW.  Performed oral care seated EOB  "w/setup.  While seated EOB,  "washed" pt's hair.  Pt sat EOB ~45'.  Pt t/f'ed to BS chair and sat up ~1 hour; ate lunch seated in chair; required setup      Education:    Assessment:     Spenser Rabago is a 57 y.o. male with a medical diagnosis of Non-traumatic rhabdomyolysis.  He presents with performance deficits affecting function are weakness, gait instability, impaired endurance, impaired balance, impaired self care skills, impaired functional mobilty, pain, decreased lower extremity function, decreased upper extremity function, decreased ROM, edema.   Rehab Prognosis:  good; patient would benefit from acute skilled OT services to address these deficits and reach maximum level of function.       Plan:     Patient to be seen 5 x/week to address the above listed problems via self-care/home management, therapeutic activities, therapeutic exercises  · Plan of Care Expires: 02/05/18  · Plan of Care Reviewed with: patient    This Plan of care has been discussed with the patient who was involved in its development and understands and is in agreement with the identified goals and treatment plan    GOALS:    Occupational Therapy Goals        Problem: Occupational Therapy Goal    Goal Priority Disciplines Outcome Interventions   Occupational Therapy Goal     OT, PT/OT Ongoing (interventions implemented as appropriate)    Description:  Goals to be met by: 2/5     Patient will increase functional independence with ADLs by performing:    UE Dressing with Set-up Assistance.  Grooming while standing at sink with Moderate Assistance.  Toileting from toilet with Moderate Assistance for hygiene and clothing management.   Supine to sit with Stand-by Assistance.  Toilet transfer to toilet with Moderate Assistance.  Increased functional strength to 4/5 for BUE.                      Time Tracking:     OT Date of Treatment: 01/11/18  OT Start Time: 1100  OT Stop Time: 1156  OT Total Time (min): 56 min   [id number]    Billable " Minutes:Self Care/Home Management 25  Therapeutic Activity 31 & 13    Armond Barger OT  1/11/2018

## 2018-01-11 NOTE — PLAN OF CARE
Problem: Occupational Therapy Goal  Goal: Occupational Therapy Goal  Goals to be met by: 2/5     Patient will increase functional independence with ADLs by performing:    UE Dressing with Set-up Assistance.  Grooming while standing at sink with Moderate Assistance.  Toileting from toilet with Moderate Assistance for hygiene and clothing management.   Supine to sit with Stand-by Assistance.  Toilet transfer to toilet with Moderate Assistance.  Increased functional strength to 4/5 for BUE.     Outcome: Ongoing (interventions implemented as appropriate)  Pt progressing toward goals.  Sat up in chair for an hour over lunchtime.      Continues to be appropriate for IPR.

## 2018-01-11 NOTE — PLAN OF CARE
Problem: Patient Care Overview  Goal: Plan of Care Review  Outcome: Ongoing (interventions implemented as appropriate)  Pt is in bed resting with no S/S of pain or distress. He is positive for Staph infection and will be on IV Vanc. He worked with PT/OT today. He is stable and will continue to monitor. Will give report to oncoming nurse.

## 2018-01-11 NOTE — PLAN OF CARE
Problem: Fall Risk (Adult)  Goal: Absence of Falls  Patient will demonstrate the desired outcomes by discharge/transition of care.   Outcome: Ongoing (interventions implemented as appropriate)  Bed in low position and locked. Alarms on and audible. Call light within reach. Education given on preventing falls and using call light. Undersatnding verbalized

## 2018-01-11 NOTE — PT/OT/SLP PROGRESS
Speech Language Pathology  Attempted Visit    Spenser Rabago  MRN: [id number]    Patient not seen today secondary to Patient fatigue (Pt somnolent upon SLP entry. Pt woke briefly to SLP verbal and tactile cuing. However, pt was observed to close eyes and began snoring).  SLP will follow-up next date.    JD Mills., CF-SLP  Speech-Language Pathologist   1/11/2018

## 2018-01-11 NOTE — PROGRESS NOTES
The Sw spoke to Dr. Henderson and he stated there pt's not stable for d/c today b/c he had a fever of 102.5. The Sw called and informed HETALyrl at [facility] and the Sw will send her updated notes tomorrow on the pt. She states she will  notify [facility] of this info. The Sw informed the pt's friend Susan of this info also.

## 2018-01-11 NOTE — PROGRESS NOTES
[facility]  Progress Note    Patient Name: Spenser Rabago  MRN: [id number]  Patient Class: IP- Inpatient   Admission Date: 1/5/2018  Length of Stay: 6 days  Attending Physician: Carlos Henderson MD  Primary Care Provider: Vladimir Apple MD        Subjective:     Principal Problem:Non-traumatic rhabdomyolysis    HPI:  Spenser Rabago is a 57 y.o. white man with hypertension, migraine headaches, attention deficit hyperactivity disorder, and depression.  He lives in [place], [place].  His primary care physician is Dr. Vladimir Apple.  His neurologist is Dr. Frank Lancaster.   He was taken by EMS to [facility] on 1/5/18 after falling and being on the floor for at least 6 hours.  Four days prior to this, a box fell on his right foot and he had pain and difficulty walking.  He was falling a lot after that.  He also had decreased appetite and oral intake the past week.  He was found to have dehydration (sodium 127, BUN 47, urine with ketones and hyaline casts), high anion gap acidosis (bicarbonate 18, anion gap 20), and rhabdomyolysis (CPK 1759).  He was admitted to [facility].    Hospital Course:  Rhabdomyolysis resolved with IV fluids.  He was found to have right greater than left lower extremity weakness.  MRI showed bilateral hyperintensity in the globus pallidi and throughout the supratentorial white matter.  Neurology was concerned for carbon monoxide exposure/toxicity.  His carboxyhemoglobin was within normal limits.  Neurology recommended hyperbaric oxygen therapy, but director of hyperbarics Dr. Alaina Kuo said that was only indicated in acute CO poisoning.  His family went to his house and found black mold in the vents.  Inpatient rehab was sought and he waited around for a few days.  Lisinopril was stopped because his blood pressures were controlled on metoprolol alone.  Alprazolam was stopped because he no longer had anxiety likely  due to the carbon monoxide brain damage.  He was set up for discharge to inpatient rehab, but on 1/10/18, he suddenly developed new fever.  Urinalysis showed no evidence for UTI.  Chest x-ray showed no pneumonia.  Influenza swab was negative.  CBC showed new leukocytosis.  Procalcitonin was elevated.  He denied any specific symptoms except for mild cough.  His left hand distal to a peripheral IV was swollen, with mild redness and tenderness at the IV site.    Interval History: No acute events.    Review of Systems   Respiratory: Positive for cough. Negative for shortness of breath.    Cardiovascular: Negative for chest pain and palpitations.   Gastrointestinal: Negative for nausea and vomiting.     Objective:     Vital Signs (Most Recent):  Temp: 99.1 °F (37.3 °C) (01/11/18 0727)  Pulse: 101 (01/11/18 0727)  Resp: 18 (01/11/18 0727)  BP: 122/82 (01/11/18 0727)  SpO2: 98 % (01/10/18 0946) Vital Signs (24h Range):  Temp:  [98.4 °F (36.9 °C)-102.9 °F (39.4 °C)] 99.1 °F (37.3 °C)  Pulse:  [101-124] 101  Resp:  [18-20] 18  BP: (122-164)/() 122/82     Weight: 74.9 kg (165 lb 2 oz)  Body mass index is 28.34 kg/m².    Intake/Output Summary (Last 24 hours) at 01/11/18 1059  Last data filed at 01/11/18 0600   Gross per 24 hour   Intake              240 ml   Output             1583 ml   Net            -1343 ml      Physical Exam   Constitutional: He is oriented to person, place, and time. He appears well-developed and well-nourished. No distress.   Pulmonary/Chest: Effort normal. No respiratory distress.   Musculoskeletal:   Left hand swelling as described in hospital course   Neurological: He is alert and oriented to person, place, and time.   Skin: Skin is warm. There is erythema.   Psychiatric: His affect is blunt. He is slowed. He does not exhibit a depressed mood.   Nursing note and vitals reviewed.      Significant Labs:   CBC:     Recent Labs  Lab 01/10/18  1742   WBC 16.35*   HGB 14.3   HCT 41.5   *      CMP:     Recent Labs  Lab 01/10/18  0425   *   K 4.3   CL 96   CO2 23      BUN 14   CREATININE 0.7   CALCIUM 8.8   ANIONGAP 11   EGFRNONAA >60     Magnesium:     Recent Labs  Lab 01/10/18  0424   MG 1.7       Significant Imaging: I have reviewed all pertinent imaging results/findings within the past 24 hours.     Assessment/Plan:      Cellulitis of left hand    Fever  Start a 7-day course of doxycycline.  Remove peripheral IVs as he no longer needs them.          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  Appreciate Neurology.  Hyperbaric oxygen therapy not indicated per Dr. Kuo.  Inpatient rehab.        Attention deficit hyperactivity disorder (ADHD)    Holding Adderall for now.         Essential hypertension    Resume home Toprol XL.  Holding lisinopril.  Monitor.         Depression    Will resume trazodone and Wellbutrin in coming days.           Mixed migraine and muscle contraction headache    Currently has alprazolam, baclofen, Percocet, Opana ER, tizanadine, and topiramate on his med list in our system.  He could not confirm medications at this time.  His primary care physician and neurologist are [facility] so should be correct.  Not complaining of any headaches or other pain at this time.           VTE Risk Mitigation         Ordered     enoxaparin injection 40 mg  Daily     Route:  Subcutaneous        01/05/18 1000     High Risk of VTE  Once      01/05/18 1000     Reason for No Pharmacological VTE Prophylaxis  Once      01/05/18 1000     Place sequential compression device  Until discontinued      01/05/18 1000        Disposition: Postpone discharge to rehab.  If fever subsides with treatment of cellulitis, can go tomorrow.      Carlos Henderson MD  Department of Hospital Medicine   [facility]

## 2018-01-12 PROBLEM — B95.61 BACTEREMIA DUE TO STAPHYLOCOCCUS AUREUS: Status: ACTIVE | Noted: 2018-01-11

## 2018-01-12 LAB
ANION GAP SERPL CALC-SCNC: 12 MMOL/L
BACTERIA UR CULT: NORMAL
BACTERIA UR CULT: NORMAL
BUN SERPL-MCNC: 28 MG/DL
CALCIUM SERPL-MCNC: 8.6 MG/DL
CHLORIDE SERPL-SCNC: 97 MMOL/L
CO2 SERPL-SCNC: 21 MMOL/L
CREAT SERPL-MCNC: 0.8 MG/DL
DIASTOLIC DYSFUNCTION: NO
ERYTHROCYTE [DISTWIDTH] IN BLOOD BY AUTOMATED COUNT: 12.5 %
EST. GFR  (AFRICAN AMERICAN): >60 ML/MIN/1.73 M^2
EST. GFR  (NON AFRICAN AMERICAN): >60 ML/MIN/1.73 M^2
GLOBAL PERICARDIAL EFFUSION: NORMAL
GLUCOSE SERPL-MCNC: 118 MG/DL
HCT VFR BLD AUTO: 38.4 %
HGB BLD-MCNC: 13.1 G/DL
MCH RBC QN AUTO: 29.5 PG
MCHC RBC AUTO-ENTMCNC: 34.1 G/DL
MCV RBC AUTO: 87 FL
PLATELET # BLD AUTO: 324 K/UL
PMV BLD AUTO: 9.8 FL
POTASSIUM SERPL-SCNC: 3.8 MMOL/L
RBC # BLD AUTO: 4.44 M/UL
RETIRED EF AND QEF - SEE NOTES: 65 (ref 55–65)
SODIUM SERPL-SCNC: 130 MMOL/L
WBC # BLD AUTO: 8.71 K/UL

## 2018-01-12 PROCEDURE — 11000001 HC ACUTE MED/SURG PRIVATE ROOM

## 2018-01-12 PROCEDURE — 92507 TX SP LANG VOICE COMM INDIV: CPT

## 2018-01-12 PROCEDURE — 97530 THERAPEUTIC ACTIVITIES: CPT

## 2018-01-12 PROCEDURE — 80048 BASIC METABOLIC PNL TOTAL CA: CPT

## 2018-01-12 PROCEDURE — 36415 COLL VENOUS BLD VENIPUNCTURE: CPT

## 2018-01-12 PROCEDURE — 25000003 PHARM REV CODE 250: Performed by: HOSPITALIST

## 2018-01-12 PROCEDURE — 63600175 PHARM REV CODE 636 W HCPCS: Performed by: HOSPITALIST

## 2018-01-12 PROCEDURE — 93307 TTE W/O DOPPLER COMPLETE: CPT | Mod: 26,,, | Performed by: INTERNAL MEDICINE

## 2018-01-12 PROCEDURE — 93307 TTE W/O DOPPLER COMPLETE: CPT

## 2018-01-12 PROCEDURE — 87040 BLOOD CULTURE FOR BACTERIA: CPT | Mod: 59

## 2018-01-12 PROCEDURE — 97535 SELF CARE MNGMENT TRAINING: CPT

## 2018-01-12 PROCEDURE — 85027 COMPLETE CBC AUTOMATED: CPT

## 2018-01-12 PROCEDURE — 97110 THERAPEUTIC EXERCISES: CPT

## 2018-01-12 PROCEDURE — A4216 STERILE WATER/SALINE, 10 ML: HCPCS | Performed by: HOSPITALIST

## 2018-01-12 RX ADMIN — SODIUM CHLORIDE, PRESERVATIVE FREE 3 ML: 5 INJECTION INTRAVENOUS at 06:01

## 2018-01-12 RX ADMIN — VANCOMYCIN HYDROCHLORIDE 1000 MG: 1 INJECTION, POWDER, LYOPHILIZED, FOR SOLUTION INTRAVENOUS at 04:01

## 2018-01-12 RX ADMIN — VANCOMYCIN HYDROCHLORIDE 1000 MG: 1 INJECTION, POWDER, LYOPHILIZED, FOR SOLUTION INTRAVENOUS at 06:01

## 2018-01-12 RX ADMIN — ENOXAPARIN SODIUM 40 MG: 100 INJECTION SUBCUTANEOUS at 05:01

## 2018-01-12 RX ADMIN — THERA TABS 1 TABLET: TAB at 09:01

## 2018-01-12 RX ADMIN — SODIUM CHLORIDE, PRESERVATIVE FREE 3 ML: 5 INJECTION INTRAVENOUS at 02:01

## 2018-01-12 RX ADMIN — METOPROLOL SUCCINATE 50 MG: 50 TABLET, EXTENDED RELEASE ORAL at 09:01

## 2018-01-12 RX ADMIN — ASPIRIN 81 MG: 81 TABLET, COATED ORAL at 09:01

## 2018-01-12 RX ADMIN — DOXYCYCLINE HYCLATE 100 MG: 100 TABLET, COATED ORAL at 09:01

## 2018-01-12 RX ADMIN — SODIUM CHLORIDE TAB 1 GM 1 G: 1 TAB at 10:01

## 2018-01-12 RX ADMIN — SODIUM CHLORIDE TAB 1 GM 1 G: 1 TAB at 09:01

## 2018-01-12 RX ADMIN — SODIUM CHLORIDE, PRESERVATIVE FREE 3 ML: 5 INJECTION INTRAVENOUS at 10:01

## 2018-01-12 NOTE — PLAN OF CARE
Problem: Pressure Ulcer Risk (Ashish Scale) (Adult,Obstetrics,Pediatric)  Goal: Skin Integrity  Patient will demonstrate the desired outcomes by discharge/transition of care.   Turn and reposition Q 2 hour for comfort and prevent skin break down.

## 2018-01-12 NOTE — SUBJECTIVE & OBJECTIVE
Past Medical History:   Diagnosis Date    ADD (attention deficit disorder)     Depression     Headache(784.0)     Hypertension     Migraines        History reviewed. No pertinent surgical history.    Review of patient's allergies indicates:   Allergen Reactions    Penicillins Hives       Medications:  Prescriptions Prior to Admission   Medication Sig    metoprolol succinate (TOPROL-XL) 50 MG 24 hr tablet Take 1 tablet (50 mg total) by mouth once daily.    [DISCONTINUED] ALPRAZolam (XANAX) 1 MG tablet Take 1 tablet (1 mg total) by mouth 2 (two) times daily.    [DISCONTINUED] baclofen (LIORESAL) 10 MG tablet Take 10 mg by mouth 3 (three) times daily.     [DISCONTINUED] buPROPion (WELLBUTRIN XL) 150 MG TB24 tablet     [DISCONTINUED] dextroamphetamine-amphetamine (ADDERALL) 20 mg tablet Take 1 tablet by mouth 3 (three) times daily.     [DISCONTINUED] lisinopril 10 MG tablet Take 10 mg by mouth once daily.    [DISCONTINUED] oxyCODONE-acetaminophen (PERCOCET)  mg per tablet     [DISCONTINUED] oxyMORphone (OPANA ER) 10 MG 12 hr tablet     [DISCONTINUED] tizanidine (ZANAFLEX) 4 MG tablet Take 4 mg by mouth every 8 (eight) hours.    [DISCONTINUED] topiramate (TOPAMAX) 100 MG tablet 100 mg every evening.     [DISCONTINUED] trazodone (DESYREL) 50 MG tablet      Antibiotics     Start     Stop Route Frequency Ordered    01/12/18 0600  vancomycin 1 g in dextrose 5 % 250 mL IVPB (ready to mix system)      -- IV Every 12 hours (non-standard times) 01/11/18 1640        Antifungals     None        Antivirals     None           Immunization History   Administered Date(s) Administered    Influenza 12/23/2014, 12/26/2014    Influenza - Quadrivalent - PF 12/01/2017    Influenza - Trivalent (ADULT) 12/23/2014    Influenza A (H1N1) 2009 Monovalent - IM 11/10/2009    Tdap 12/01/2017       Family History     Problem Relation (Age of Onset)    Heart disease Mother, Father    Hypertension Mother, Father         Social History     Social History    Marital status: Single     Spouse name: N/A    Number of children: N/A    Years of education: N/A     Social History Main Topics    Smoking status: Never Smoker    Smokeless tobacco: Never Used    Alcohol use 0.6 oz/week     1 Shots of liquor per week    Drug use: No    Sexual activity: Not Asked     Other Topics Concern    None     Social History Narrative    ** Merged History Encounter **          Review of Systems   Constitutional: Positive for fatigue. Negative for chills and fever.   HENT: Negative for hearing loss and tinnitus.    Respiratory: Negative for cough and shortness of breath.    Cardiovascular: Negative for chest pain and palpitations.   Gastrointestinal: Negative for abdominal pain, diarrhea, nausea and vomiting.   Genitourinary: Negative for dysuria and hematuria.   Musculoskeletal: Negative for arthralgias and myalgias.   Neurological: Negative for dizziness and syncope.   Psychiatric/Behavioral: Positive for dysphoric mood. Negative for agitation and confusion.     Objective:     Vital Signs (Most Recent):  Temp: 98.7 °F (37.1 °C) (01/12/18 0348)  Pulse: 75 (01/12/18 0701)  Resp: 18 (01/12/18 0348)  BP: 127/82 (01/12/18 0701)  SpO2: 99 % (01/11/18 1936) Vital Signs (24h Range):  Temp:  [98.6 °F (37 °C)-99.4 °F (37.4 °C)] 98.7 °F (37.1 °C)  Pulse:  [75-85] 75  Resp:  [18] 18  SpO2:  [99 %] 99 %  BP: (109-127)/(71-82) 127/82     Weight: 74.9 kg (165 lb 2 oz)  Body mass index is 28.34 kg/m².    Estimated Creatinine Clearance: 93.2 mL/min (based on SCr of 0.8 mg/dL).    Physical Exam   Constitutional: He is oriented to person, place, and time. He appears well-developed and well-nourished.   HENT:   Head: Normocephalic and atraumatic.   Eyes: Conjunctivae and EOM are normal. Pupils are equal, round, and reactive to light.   Neck: Normal range of motion. Neck supple.   Cardiovascular: Normal rate and regular rhythm.    3/6 systolic murmur    Pulmonary/Chest: Effort normal and breath sounds normal.   Abdominal: Soft. Bowel sounds are normal.   Musculoskeletal: Normal range of motion.   L hand area of cellulitis 4x3cm area, black in color. No fluctuance. Do drainage/discharge.    Neurological: He is alert and oriented to person, place, and time.   Skin: Skin is warm and dry.   Psychiatric: His behavior is normal. Thought content normal.       Significant Labs:   Blood Culture:   Recent Labs  Lab 01/05/18  0510 01/05/18  0522 01/10/18  1742 01/11/18  1756   LABBLOO No growth after 5 days. No growth after 5 days. Gram stain aer bottle: Gram positive cocci in clusters resembling Staph   Results called to and read back by:Yolanda Estrada 01/11/2018  15:49  STAPHYLOCOCCUS AUREUSID consult required at [facility] and [facility].For susceptibility see order # [id number]  Gram stain aer bottle: Gram positive cocci in clusters resembling Staph   Results called to and read back by: Yolanda Estrada 01/11/2018  15:48  STAPHYLOCOCCUS AUREUSSusceptibility pendingID consult required at [facility] and [facility]. No Growth to date  No Growth to date       Significant Imaging: No new imaging

## 2018-01-12 NOTE — PT/OT/SLP PROGRESS
Occupational Therapy   Treatment    Name: Spenser Rabago  MRN: [id number]  Admitting Diagnosis:  Non-traumatic rhabdomyolysis       Recommendations:     Discharge Recommendations: rehabilitation facility  Discharge Equipment Recommendations:   (Defer to IPR)  Barriers to discharge:  Decreased caregiver support    Subjective     Communicated with: Pily lopez prior to session.  Pain/Comfort:  · Pain Rating 1:  (reports pain in RLE, but does not rate)    Objective:     Patient found with: bed alarm, peripheral IV, pressure relief boots    General Precautions: Standard, fall   Orthopedic Precautions:N/A   Braces: N/A     Bed Mobility:    · Patient completed Scooting/Bridging with contact guard assistance  · Patient completed Supine to Sit with minimum assistance and moderate assistance     Functional Mobility/Transfers:  · Patient completed Sit <> Stand Transfer with stand by assistance and contact guard assistance  with  rolling walker   · Patient completed Bed <> Chair Transfer using Step Transfer technique with contact guard assistance and minimum assistance with rolling walker    Activities of Daily Living:  · Grooming: supervision set-up  · Toileting: total assistance incontinent of urine    Patient left up in chair with all lines intact, call button in reach and RN notified    [facility] 6 Click:  [facility] Total Score: 15    Treatment & Education:  Patient agreeable to therapy. Still with depressed/flat affect, but more verbal than previous day's session. Sup > sit with min-mod (A) at BLEs -- requiring increased time, VCs, and encouragement. Patient able to use UEs to push trunk upright. Patient stood to change linens 2* urinary incontinence. Patient stood second time with SBA and ambulated to door and back using RW with CGA and MIn (A) during turns 2* 1 LOB. Patient t/f into bedside chair and BLEs elevated. Set-up with all items for oral care and facial hygiene. Patient left to complete on own -- when passing in  beatris 3 minutes later, patient noted to be brushing teeth.   Education:    Assessment:   Patient remains with flat, depressed affect. Requires encouragement/motivation for initiation of tasks. Patient remains appropriate for IPR to address functional deficits.     Spenser Rabago is a 57 y.o. male with a medical diagnosis of Non-traumatic rhabdomyolysis.  Performance deficits affecting function are weakness, impaired endurance, impaired self care skills, impaired functional mobilty, gait instability, impaired balance, decreased upper extremity function, decreased lower extremity function, decreased ROM, impaired cognition, decreased safety awareness, impaired coordination, impaired fine motor, pain, edema, impaired joint extensibility.      Rehab Prognosis:  Good; patient would benefit from acute skilled OT services to address these deficits and reach maximum level of function.       Plan:     Patient to be seen 5 x/week to address the above listed problems via self-care/home management, therapeutic activities, therapeutic exercises  · Plan of Care Expires: 02/05/18  · Plan of Care Reviewed with: patient    This Plan of care has been discussed with the patient who was involved in its development and understands and is in agreement with the identified goals and treatment plan    GOALS:    Occupational Therapy Goals        Problem: Occupational Therapy Goal    Goal Priority Disciplines Outcome Interventions   Occupational Therapy Goal     OT, PT/OT Ongoing (interventions implemented as appropriate)    Description:  Goals to be met by: 2/5     Patient will increase functional independence with ADLs by performing:    UE Dressing with Set-up Assistance.  Grooming while standing at sink with Moderate Assistance.  Toileting from toilet with Moderate Assistance for hygiene and clothing management.   Supine to sit with Stand-by Assistance.  Toilet transfer to toilet with Moderate Assistance.  Increased functional strength  to 4/5 for BUE.                      Time Tracking:     OT Date of Treatment: 01/12/18  OT Start Time: 1015  OT Stop Time: 1054  OT Total Time (min): 39 min    Billable Minutes:Self Care/Home Management 10  Therapeutic Activity 29    NATALIYA Garay/TABITHA  1/12/2018

## 2018-01-12 NOTE — ASSESSMENT & PLAN NOTE
- On exam, 4cm x 3cm area of cellulitis, black in color. No discharge/drainage  - As per patient, started on day of admission 1/5  - F/u US to rule out abscess    - Recommend Ortho Hand Surgery Consult  - This is likely nidus for Staph Bacteremia. Continue on Vancomycin for now.

## 2018-01-12 NOTE — PLAN OF CARE
Problem: SLP Goal  Goal: SLP Goal  Short Term Goals:  1. Patient will successfully participate in clhvlm-nensduxj-nnoapdcpn evaluation to further assess for any communication impairments.  2. Pt will answer y/n complex questions with 90%.  3. Pt will recall events of the day with min A using compensatory memory strategies.  4. Pt will complete 3-4 word mental manipulation tasks with 80%.   5. Pt will complete category exclusion tasks with 80%.   6. Pt will complete time/money reasoning tasks with 80%.   7. Pt will state 3 physical/cognitive changes impacting safety/independence with ADLS with min A.      Outcome: Ongoing (interventions implemented as appropriate)  Pt making progress towards est POC, pt remains with flat affect during session.

## 2018-01-12 NOTE — HPI
" 57 y.o. male who  has a past medical history of ADD (attention deficit disorder); Depression; Headache(784.0); Hypertension; and Migraines presents with Fall x1 day    Patient presented to ED after a fall and was found to be in rhabdomyolysis. MRI brain was performed in which he was found to have brain injuries consistent with CO poisoning. On 1/10, as patient was being prepared to be discharged to IP rehab, he spiked a fever. Blood cx's were drawn and were positive for Staph.    On exam, patient has an area of 4cm x 3cm area of cellulitis that is dark black in odor, no discharge/drainage, with malodor. He states that this wound first became apparent at admission on 1/5. Patient admits to feeling tired and  "down," denies fever, chills, chest pain, abdominal pain.  "

## 2018-01-12 NOTE — PROGRESS NOTES
The Sw spoke to Noon at [facility] and gave her an update on the pt. The Sw was going to send her updated notes but she asked the Sw to send it on Monday.

## 2018-01-12 NOTE — PLAN OF CARE
Problem: Patient Care Overview  Goal: Plan of Care Review  Outcome: Ongoing (interventions implemented as appropriate)  Pt is in bed resting with no S/S of pian or distress. He has been more verbal today. He worked with PT/Ot and sat up in chair. He had a consult to Infectious Disease. He is currently stable and will continue to monitor. Will give report to oncoming nurse.

## 2018-01-12 NOTE — PT/OT/SLP PROGRESS
"Speech Language Pathology Treatment    Patient Name:  Spenser Rabago   MRN:  [id number]  Admitting Diagnosis: Non-traumatic rhabdomyolysis    Recommendations:                 General Recommendations:  Cognitive-linguistic therapy  Diet recommendations:  Regular, Liquid Diet Level: Thin   Aspiration Precautions: standard aspiration precautions   General Precautions: Standard, fall  Communication strategies:  provide increased time to answer    Subjective     Pt seen with OT for cog-ling tx.   Patient goals: "I feel down and I hate to see myself like this."     Pain/Comfort:  · Pain Rating Post-Intervention 1: 0/10    Objective:     Has the patient been evaluated by SLP for swallowing?   Yes  Keep patient NPO? No   Current Respiratory Status: room air      Pt found bedside slumped over in bed. OT entered room after SLP and assisted pt to reposition in bed.   Pt with flat affect, alert and cooperative. SLP did clear with RN prior to tx.   Pt assisted with selecting dinner and tomorrow meal options with dietary tech. Pt required time for answering questions and had more success with verbal choice of 3.  Pt answered temp orientation questions with 70% acc, mod cues.   Pt completed digit recall with 50% acc. Pt recalled recent events with 50% acc, stating "Its hard for me to think."  Pt completed simple ADL sequencing with time to process with mod cues from SLP and OT.   Cont POC goals.   Pt awaiting dc to rehab.     Assessment:     Spenser Rabago is a 57 y.o. male with an SLP diagnosis of cognitive-linguistic deficits but is making daily progress.     Goals:    SLP Goals        Problem: SLP Goal    Goal Priority Disciplines Outcome   SLP Goal     SLP Ongoing (interventions implemented as appropriate)   Description:  Short Term Goals:  1. Patient will successfully participate in mftnzz-gzxyhhid-nvwhanppu evaluation to further assess for any communication impairments.  2. Pt will answer y/n complex questions with " 90%.  3. Pt will recall events of the day with min A using compensatory memory strategies.  4. Pt will complete 3-4 word mental manipulation tasks with 80%.   5. Pt will complete category exclusion tasks with 80%.   6. Pt will complete time/money reasoning tasks with 80%.   7. Pt will state 3 physical/cognitive changes impacting safety/independence with ADLS with min A.                       Plan:     · Patient to be seen:  3 x/week   · Plan of Care expires:  02/07/18  · Plan of Care reviewed with:  patient   · SLP Follow-Up:  Yes       Discharge recommendations:  rehabilitation facility   Barriers to Discharge:  Decreased Care Giver Support    Time Tracking:     SLP Treatment Date:   01/12/18  Speech Start Time:  1021  Speech Stop Time:  1035     Speech Total Time (min):  14 min    Billable Minutes: Speech Therapy Individual 14    KAILA Nash, CCC-SLP  01/12/2018

## 2018-01-12 NOTE — PT/OT/SLP PROGRESS
"Physical Therapy Treatment    Patient Name:  Spenser Rabago   MRN:  [id number]    Recommendations:     Discharge Recommendations:  rehabilitation facility   Discharge Equipment Recommendations:  (defer to IPR)   Barriers to discharge: Inaccessible home and Decreased caregiver support    Assessment:     Spenser Rabago is a 57 y.o. male admitted with a medical diagnosis of Non-traumatic rhabdomyolysis.  He presents with the following impairments/functional limitations:  weakness, impaired endurance, impaired self care skills, impaired functional mobilty, decreased upper extremity function, decreased lower extremity function, decreased ROM.  Returned to see pt after echocardiogram.  Pt required assist for cleaning secondary to urinating.  Pt performed bed mobility L and R with S and B/R for assist.  Pt demonstrates weakness BLEs R >L and in L hand with edema present.  Pt would continue to benefit from P.T. To address impairments listed below .    Rehab Prognosis:  good; patient would benefit from acute skilled PT services to address these deficits and reach maximum level of function.      Recent Surgery: * No surgery found *      Plan:     During this hospitalization, patient to be seen 6 x/week to address the above listed problems via gait training, therapeutic activities, therapeutic exercises, neuromuscular re-education  · Plan of Care Expires:  02/04/18   Plan of Care Reviewed with: patient    Subjective     Communicated with RN (Luh) prior to session.  Patient found supine upon PT entry to room, agreeable to treatment.        Patient comments:  Pt stated, "I am wet," and requested assistance.  Pain/Comfort:  · Pain Rating 1: 0/10  · Location - Side 1: Bilateral  · Location 1:  (calf)  · Pain Addressed 1: Reposition, Nurse notified  · Pain Rating Post-Intervention 1: 0/10    Patients cultural, spiritual, Taoism conflicts given the current situation: None verbalized to PT    Objective:     Patient " found with: bed alarm     General Precautions: Standard, fall   Orthopedic Precautions:N/A   Braces:       Functional Mobility:  · Bed Mobility:     · Rolling Left:  supervision  · Rolling Right: supervision  · Scooting: minimum assistance and at B ankles to stabilize for pt to push up to HOB with vc's and using B hands on B/R. for assistance.      AM-PAC 6 CLICK MOBILITY  Turning over in bed (including adjusting bedclothes, sheets and blankets)?: 4  Sitting down on and standing up from a chair with arms (e.g., wheelchair, bedside commode, etc.): 3  Moving from lying on back to sitting on the side of the bed?: 3  Moving to and from a bed to a chair (including a wheelchair)?: 3  Need to walk in hospital room?: 3  Climbing 3-5 steps with a railing?: 1  Total Score: 17       Therapeutic Activities and Exercises:   Bed mobility L and R x 3 reps each way with S and B/R for assistance to assist pt with cleaning buttock.  Pt was given a wipe and was able to self clean pubic area.      Patient left supine with all lines intact, call button in reach, bed alarm on and RN notified..    GOALS:    Physical Therapy Goals        Problem: Physical Therapy Goal    Goal Priority Disciplines Outcome Goal Variances Interventions   Physical Therapy Goal     PT/OT, PT Ongoing (interventions implemented as appropriate)     Description:  Goals to be met by: 18     Patient will increase functional independence with mobility by performin. Supine to sit with Stand-by Assistance  2. Sit to supine with Stand-by Assistance MET 2018  3. Sit to stand transfer with Moderate Assistance MET 2018  4. Bed to chair transfer with Moderate Assistance using least restirctive AD MET 2018  5.  Assess gait     Updated Goals:  1. Supine<>sit with SBA  2. Sit to stand to SBA with RW  3. Bed to chair transfers with RW and CGA  4. Gait x 20 ft with RW and CGA                       Time Tracking:     PT Received On: 18  PT Start Time:  1550     PT Stop Time: 1601  PT Total Time (min): 11     Billable Minutes: Therapeutic Activity 11    Treatment Type: Treatment  PT/PTA: PTA     PTA Visit Number: 2     Giuliana Odonnell PTA  01/12/2018

## 2018-01-12 NOTE — PLAN OF CARE
Problem: Occupational Therapy Goal  Goal: Occupational Therapy Goal  Goals to be met by: 2/5     Patient will increase functional independence with ADLs by performing:    UE Dressing with Set-up Assistance.  Grooming while standing at sink with Moderate Assistance.  Toileting from toilet with Moderate Assistance for hygiene and clothing management.   Supine to sit with Stand-by Assistance.  Toilet transfer to toilet with Moderate Assistance.  Increased functional strength to 4/5 for BUE.     Outcome: Ongoing (interventions implemented as appropriate)  Patient remains with flat, depressed affect. Requires encouragement/motivation for initiation of tasks. Patient remains appropriate for IPR to address functional deficits.

## 2018-01-12 NOTE — PROGRESS NOTES
[facility] Hospital Medicine  Progress Note    Patient Name: Spenser Rabago  MRN: [id number]  Patient Class: IP- Inpatient   Admission Date: 1/5/2018  Length of Stay: 7 days  Attending Physician: Carlos Henderson MD  Primary Care Provider: Vladimir Apple MD        Subjective:     Principal Problem:Non-traumatic rhabdomyolysis    HPI:  Spenser Rabago is a 57 y.o. white man with hypertension, migraine headaches, attention deficit hyperactivity disorder, and depression.  He lives in [place], [place].  His primary care physician is Dr. Vladimir Apple.  His neurologist is Dr. Frank Lancaster.   He was taken by EMS to [facility] on 1/5/18 after falling and being on the floor for at least 6 hours.  Four days prior to this, a box fell on his right foot and he had pain and difficulty walking.  He was falling a lot after that.  He also had decreased appetite and oral intake the past week.  He was found to have dehydration (sodium 127, BUN 47, urine with ketones and hyaline casts), high anion gap acidosis (bicarbonate 18, anion gap 20), and rhabdomyolysis (CPK 1759).  He was admitted to [facility].    Hospital Course:  Rhabdomyolysis resolved with IV fluids.  He was found to have right greater than left lower extremity weakness.  MRI showed bilateral hyperintensity in the globus pallidi and throughout the supratentorial white matter.  Neurology was concerned for carbon monoxide exposure/toxicity.  His carboxyhemoglobin was within normal limits.  Neurology recommended hyperbaric oxygen therapy, but director of hyperbarics Dr. Alaina Kuo said that was only indicated in acute CO poisoning.  His family went to his house and found black mold in the vents.  Inpatient rehab was sought and he waited around for a few days.  Lisinopril was stopped because his blood pressures were controlled on metoprolol alone.  Alprazolam was stopped because he no longer had anxiety likely  due to the carbon monoxide brain damage.              He was set up for discharge to inpatient rehab, but on 1/10/18, he suddenly developed new fever.  Urinalysis showed no evidence for UTI.  Chest x-ray showed no pneumonia.  Influenza swab was negative.  CBC showed new leukocytosis.  Procalcitonin was elevated.  He denied any specific symptoms except for mild cough.  His left hand distal to a peripheral IV was swollen, with mild redness and tenderness at the IV site.  Doxycycline was started for suspected cellulitis.  Peripheral IVs were removed.  The blood cultures grew Staphylococcus aureus so a new peripheral IV was placed, doxycycline was changed to IV vancomycin, and Infectious Disease was consulted.  Leukocytosis resolved after initiating antibiotics.  Infectious Disease noted the hand cellulitis, but Mr. Rabago told them that it had been there since admission.  They recommended repeating the echocardiogram, after hearing a systolic murmur.    Interval History: No acute events.    Review of Systems   Respiratory: Negative for shortness of breath.    Cardiovascular: Negative for chest pain and palpitations.   Gastrointestinal: Negative for nausea and vomiting.     Objective:     Vital Signs (Most Recent):  Temp: 98.5 °F (36.9 °C) (01/12/18 1604)  Pulse: 75 (01/12/18 1604)  Resp: 19 (01/12/18 1604)  BP: 119/72 (01/12/18 1604)  SpO2: 99 % (01/11/18 1936) Vital Signs (24h Range):  Temp:  [98.5 °F (36.9 °C)-99.4 °F (37.4 °C)] 98.5 °F (36.9 °C)  Pulse:  [75-85] 75  Resp:  [18-19] 19  SpO2:  [99 %] 99 %  BP: (109-127)/(71-82) 119/72     Weight: 74.9 kg (165 lb 2 oz)  Body mass index is 28.34 kg/m².    Intake/Output Summary (Last 24 hours) at 01/12/18 1717  Last data filed at 01/12/18 0613   Gross per 24 hour   Intake              256 ml   Output              658 ml   Net             -402 ml      Physical Exam   Constitutional: He is oriented to person, place, and time. He appears well-developed and well-nourished. No  distress.   Pulmonary/Chest: Effort normal. No respiratory distress.   Musculoskeletal:   Left hand redness and swelling   Neurological: He is alert and oriented to person, place, and time.   Skin: Skin is warm. There is erythema.   Psychiatric: His affect is blunt. He is slowed. He does not exhibit a depressed mood.   Nursing note and vitals reviewed.      Significant Labs:   CBC:     Recent Labs  Lab 01/10/18  1742 01/12/18  1009   WBC 16.35* 8.71   HGB 14.3 13.1*   HCT 41.5 38.4*   * 324     CMP:     Recent Labs  Lab 01/12/18  1009   *   K 3.8   CL 97   CO2 21*   *   BUN 28*   CREATININE 0.8   CALCIUM 8.6*   ANIONGAP 12   EGFRNONAA >60     Magnesium:   No results for input(s): MG in the last 48 hours.    Significant Imaging: I have reviewed all pertinent imaging results/findings within the past 24 hours.     Assessment/Plan:      Bacteremia due to Staphylococcus aureus    Probably caused by his cellulitis, which was not documented on admission.  Started vancomycin.  Appreciate ID.  Getting hand ultrasound and will order another echocardiogram.          Cellulitis of left hand    Started vancomycin and this is probably where the Staph entered his body.          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  Appreciate Neurology.  Hyperbaric oxygen therapy not indicated per Dr. Kuo.  Inpatient rehab.        Attention deficit hyperactivity disorder (ADHD)    Holding Adderall for now.         Essential hypertension    Resume home Toprol XL.  Holding lisinopril.  Monitor.         Depression    Will resume trazodone and Wellbutrin in coming days.           Mixed migraine and muscle contraction headache    Currently has alprazolam, baclofen, Percocet, Opana ER, tizanadine, and topiramate on his med list in our system.  He could not confirm medications at this time.  His primary care physician and neurologist are [facility] so should  be correct.  Not complaining of any headaches or other pain at this time.           VTE Risk Mitigation         Ordered     enoxaparin injection 40 mg  Daily     Route:  Subcutaneous        01/05/18 1000     High Risk of VTE  Once      01/05/18 1000     Reason for No Pharmacological VTE Prophylaxis  Once      01/05/18 1000     Place sequential compression device  Until discontinued      01/05/18 1000              Carlos Henderson MD  Department of Hospital Medicine   [facility]

## 2018-01-12 NOTE — CONSULTS
[facility]  Infectious Disease  Consult Note    Patient Name: Spenser Rabago  MRN: [id number]  Admission Date: 1/5/2018  Hospital Length of Stay: 7 days  Attending Physician: Carlos Henderson MD  Primary Care Provider: Vladimir Apple MD     Isolation Status: No active isolations    Patient information was obtained from patient.      Consults  Assessment/Plan:     Bacteremia due to Staphylococcus aureus    - Patient with fever on 1/10. Likely nidus is L hand.   - 1/10 Blood Cx x2: Staph Aureus (sensativity pending)   - 1/11 Blood Cx x2: NGTD    1/5 TTE: No vegetations.    - Due to new onset MRSA bacteremia, and murmur auscultated, f/u repeat TTE. Consider MARIYA pending TTE results.  - Continue on Vancomycin for now, pending sensitivities.          Cellulitis of left hand    - On exam, 4cm x 3cm area of cellulitis, black in color. No discharge/drainage  - As per patient, started on day of admission 1/5  - F/u US to rule out abscess    - Recommend Ortho Hand Surgery Consult  - This is likely nidus for Staph Bacteremia. Continue on Vancomycin for now.            Thank you for your consult. I will follow-up with patient. Please contact us if you have any additional questions.    Sekou Carrasco MD  Infectious Disease  [facility]    Subjective:     Principal Problem: Non-traumatic rhabdomyolysis    HPI:  57 y.o. male who  has a past medical history of ADD (attention deficit disorder); Depression; Headache(784.0); Hypertension; and Migraines presents with Fall x1 day    Patient presented to ED after a fall and was found to be in rhabdomyolysis. MRI brain was performed in which he was found to have brain injuries consistent with CO poisoning. On 1/10, as patient was being prepared to be discharged to IP rehab, he spiked a fever. Blood cx's were drawn and were positive for Staph.    On exam, patient has an area of 4cm x 3cm area of cellulitis that is dark black in odor, no  "discharge/drainage, with malodor. He states that this wound first became apparent at admission on 1/5. Patient admits to feeling tired and  "down," denies fever, chills, chest pain, abdominal pain.    Past Medical History:   Diagnosis Date    ADD (attention deficit disorder)     Depression     Headache(784.0)     Hypertension     Migraines        History reviewed. No pertinent surgical history.    Review of patient's allergies indicates:   Allergen Reactions    Penicillins Hives       Medications:  Prescriptions Prior to Admission   Medication Sig    metoprolol succinate (TOPROL-XL) 50 MG 24 hr tablet Take 1 tablet (50 mg total) by mouth once daily.    [DISCONTINUED] ALPRAZolam (XANAX) 1 MG tablet Take 1 tablet (1 mg total) by mouth 2 (two) times daily.    [DISCONTINUED] baclofen (LIORESAL) 10 MG tablet Take 10 mg by mouth 3 (three) times daily.     [DISCONTINUED] buPROPion (WELLBUTRIN XL) 150 MG TB24 tablet     [DISCONTINUED] dextroamphetamine-amphetamine (ADDERALL) 20 mg tablet Take 1 tablet by mouth 3 (three) times daily.     [DISCONTINUED] lisinopril 10 MG tablet Take 10 mg by mouth once daily.    [DISCONTINUED] oxyCODONE-acetaminophen (PERCOCET)  mg per tablet     [DISCONTINUED] oxyMORphone (OPANA ER) 10 MG 12 hr tablet     [DISCONTINUED] tizanidine (ZANAFLEX) 4 MG tablet Take 4 mg by mouth every 8 (eight) hours.    [DISCONTINUED] topiramate (TOPAMAX) 100 MG tablet 100 mg every evening.     [DISCONTINUED] trazodone (DESYREL) 50 MG tablet      Antibiotics     Start     Stop Route Frequency Ordered    01/12/18 0600  vancomycin 1 g in dextrose 5 % 250 mL IVPB (ready to mix system)      -- IV Every 12 hours (non-standard times) 01/11/18 1640        Antifungals     None        Antivirals     None           Immunization History   Administered Date(s) Administered    Influenza 12/23/2014, 12/26/2014    Influenza - Quadrivalent - PF 12/01/2017    Influenza - Trivalent (ADULT) 12/23/2014    " Influenza A (H1N1) 2009 Monovalent - IM 11/10/2009    Tdap 12/01/2017       Family History     Problem Relation (Age of Onset)    Heart disease Mother, Father    Hypertension Mother, Father        Social History     Social History    Marital status: Single     Spouse name: N/A    Number of children: N/A    Years of education: N/A     Social History Main Topics    Smoking status: Never Smoker    Smokeless tobacco: Never Used    Alcohol use 0.6 oz/week     1 Shots of liquor per week    Drug use: No    Sexual activity: Not Asked     Other Topics Concern    None     Social History Narrative    ** Merged History Encounter **          Review of Systems   Constitutional: Positive for fatigue. Negative for chills and fever.   HENT: Negative for hearing loss and tinnitus.    Respiratory: Negative for cough and shortness of breath.    Cardiovascular: Negative for chest pain and palpitations.   Gastrointestinal: Negative for abdominal pain, diarrhea, nausea and vomiting.   Genitourinary: Negative for dysuria and hematuria.   Musculoskeletal: Negative for arthralgias and myalgias.   Neurological: Negative for dizziness and syncope.   Psychiatric/Behavioral: Positive for dysphoric mood. Negative for agitation and confusion.     Objective:     Vital Signs (Most Recent):  Temp: 98.7 °F (37.1 °C) (01/12/18 0348)  Pulse: 75 (01/12/18 0701)  Resp: 18 (01/12/18 0348)  BP: 127/82 (01/12/18 0701)  SpO2: 99 % (01/11/18 1936) Vital Signs (24h Range):  Temp:  [98.6 °F (37 °C)-99.4 °F (37.4 °C)] 98.7 °F (37.1 °C)  Pulse:  [75-85] 75  Resp:  [18] 18  SpO2:  [99 %] 99 %  BP: (109-127)/(71-82) 127/82     Weight: 74.9 kg (165 lb 2 oz)  Body mass index is 28.34 kg/m².    Estimated Creatinine Clearance: 93.2 mL/min (based on SCr of 0.8 mg/dL).    Physical Exam   Constitutional: He is oriented to person, place, and time. He appears well-developed and well-nourished.   HENT:   Head: Normocephalic and atraumatic.   Eyes: Conjunctivae and  EOM are normal. Pupils are equal, round, and reactive to light.   Neck: Normal range of motion. Neck supple.   Cardiovascular: Normal rate and regular rhythm.    3/6 systolic murmur   Pulmonary/Chest: Effort normal and breath sounds normal.   Abdominal: Soft. Bowel sounds are normal.   Musculoskeletal: Normal range of motion.   L hand area of cellulitis 4x3cm area, black in color. No fluctuance. Do drainage/discharge.    Neurological: He is alert and oriented to person, place, and time.   Skin: Skin is warm and dry.   Psychiatric: His behavior is normal. Thought content normal.       Significant Labs:   Blood Culture:   Recent Labs  Lab 01/05/18  0510 01/05/18  0522 01/10/18  1742 01/11/18  1756   LABBLOO No growth after 5 days. No growth after 5 days. Gram stain aer bottle: Gram positive cocci in clusters resembling Staph   Results called to and read back by:Yolanda Estrada 01/11/2018  15:49  STAPHYLOCOCCUS [facility]USID [facility].For susceptibility see order # [id number]  Gram stain aer bottle: Gram positive cocci in clusters resembling Staph   Results called to and read back by: Yolanda Estrada 01/11/2018  15:48  STAPHYLOCOCCUS AUREUSSusceptibility pendingID consult required at [facility]. No Growth to date  No Growth to date       Significant Imaging: No new imaging

## 2018-01-12 NOTE — PT/OT/SLP PROGRESS
Physical Therapy Treatment    Patient Name:  Spenser Rabago   MRN:  [id number]    Recommendations:     Discharge Recommendations:  rehabilitation facility   Discharge Equipment Recommendations:  (defer to IPR)   Barriers to discharge: Inaccessible home and Decreased caregiver support    Assessment:     Spenser Rabago is a 57 y.o. male admitted with a medical diagnosis of Non-traumatic rhabdomyolysis.  He presents with the following impairments/functional limitations:  weakness, impaired endurance, impaired functional mobilty, impaired self care skills, pain, decreased lower extremity function.  Tx interrupted by echocardiogram.  Pt would continue to benefit from P.T. To address impairments listed above..    Rehab Prognosis:  good; patient would benefit from acute skilled PT services to address these deficits and reach maximum level of function.      Recent Surgery: * No surgery found *      Plan:     During this hospitalization, patient to be seen 6 x/week to address the above listed problems via gait training, therapeutic activities, therapeutic exercises, neuromuscular re-education  · Plan of Care Expires:  02/04/18   Plan of Care Reviewed with: patient    Subjective     Communicated with RN (Luisa) prior to session.  Patient found supine upon PT entry to room, agreeable to treatment.        Patient comments:  Pt agreed to tx.  Pain/Comfort:  · Pain Rating 1:  (pain with HC stretches bilaterally, but does not rate)  · Location - Side 1: Bilateral  · Location 1:  (calf)  · Pain Addressed 1: Reposition, Nurse notified  · Pain Rating Post-Intervention 1:  (as above)    Patients cultural, spiritual, Hinduism conflicts given the current situation: None verbalized to PT    Objective:     Patient found with: bed alarm     General Precautions: Standard, fall   Orthopedic Precautions:N/A   Braces:       Functional Mobility:  · Bed Mobility:     · Rolling Left:  S with b/r for assistance  · Rolling Right: S with  b/r for assistance  · Scooting: minimum assistance with pt supine in hookly to stabilize at ankles so pt could use BLEs to push up to HOB.      AM-PAC 6 CLICK MOBILITY  Turning over in bed (including adjusting bedclothes, sheets and blankets)?: 3  Sitting down on and standing up from a chair with arms (e.g., wheelchair, bedside commode, etc.): 3  Moving from lying on back to sitting on the side of the bed?: 3  Moving to and from a bed to a chair (including a wheelchair)?: 3  Need to walk in hospital room?: 3  Climbing 3-5 steps with a railing?: 1  Total Score: 16       Therapeutic Activities and Exercises:   Pt education for pressure relief and turning every two hours.  Pt was instructed to place a pillow between BLEs when in sidelying and to float heels off pillow when in supine.  This was demonstrated to patient by PTA  Stretch to B HCs 3 x 20 sec hold.  Pt c/o some pain with initial movement, but then tolerated well.  AP (L with minimal resistance and right with Dep A secondary to trace muscle stretch for DF), heelslides, hip ABD/ADD/IR/ER x 15 reps with Tye to alleviate weight at heel on R and CGA/Tye on L.  Tx was stopped secondary to patient needing to have an echocardiogram.    Patient left supine with all lines intact, call button in reach, bed alarm on and RN notified..    GOALS:    Physical Therapy Goals        Problem: Physical Therapy Goal    Goal Priority Disciplines Outcome Goal Variances Interventions   Physical Therapy Goal     PT/OT, PT Ongoing (interventions implemented as appropriate)     Description:  Goals to be met by: 18     Patient will increase functional independence with mobility by performin. Supine to sit with Stand-by Assistance  2. Sit to supine with Stand-by Assistance MET 2018  3. Sit to stand transfer with Moderate Assistance MET 2018  4. Bed to chair transfer with Moderate Assistance using least restirctive AD MET 2018  5.  Assess gait     Updated  Goals:  1. Supine<>sit with SBA  2. Sit to stand to SBA with RW  3. Bed to chair transfers with RW and CGA  4. Gait x 20 ft with RW and CGA                       Time Tracking:     PT Received On: 01/12/18  PT Start Time: 1446     PT Stop Time: 1503  PT Total Time (min): 17 min     Billable Minutes: Therapeutic Exercise 17    Treatment Type: Treatment  PT/PTA: PTA     PTA Visit Number: 2     Giuliana Odonnell PTA  01/12/2018

## 2018-01-12 NOTE — ASSESSMENT & PLAN NOTE
- Patient with fever on 1/10. Likely nidus is L hand.   - 1/10 Blood Cx x2: Staph Aureus (sensativity pending)   - 1/11 Blood Cx x2: NGTD    1/5 TTE: No vegetations.    - Due to new onset MRSA bacteremia, and murmur auscultated, f/u repeat TTE. Consider MARIYA pending TTE results.  - Continue on Vancomycin for now, pending sensitivities.

## 2018-01-12 NOTE — ASSESSMENT & PLAN NOTE
Probably caused by his cellulitis, which was not documented on admission.  Started vancomycin.  Appreciate ID.  Getting hand ultrasound and will order another echocardiogram.

## 2018-01-12 NOTE — PLAN OF CARE
Problem: Fall Risk (Adult)  Goal: Absence of Falls  Patient will demonstrate the desired outcomes by discharge/transition of care.   Bed alarm on and bed in lowest position. Call bell in reach. Q 2 hour maintained for 5Ps

## 2018-01-12 NOTE — PLAN OF CARE
TN went to meet with patient. He is working with therapy at this time. TN updated patient on discharge plan. No discharge today, possibly Monday to IPR. Per request from patient, updated friend Susan and brother. TN will continue to follow.    Future Appointments  Date Time Provider Department Center   2/2/2018 3:40 PM Frank Lancaster MD [facility]     Follow-up With  Details  Why  Contact Info   Vladimir Apple MD  Go to  after inpatient rehab stay  [address]  [address]  [place] [address]  [phone]   [facility]    [facility]     Dr. Franki Lancaster  On 2/2/2018  at 3:40 pm--Neurology Follow-Up  [address]  [address]  [place], [place] [id number] [phone]   Hoa Chung MD  Schedule an appointment as soon as possible for a visit  Psychiatric Doctor  [address] [place] [id number]  [phone]         01/12/18 1511   Discharge Reassessment   Assessment Type Discharge Planning Reassessment   Provided patient/caregiver education on the expected discharge date and the discharge plan Yes   Do you have any problems affording any of your prescribed medications? TBD   Discharge Plan A Rehab   Discharge Plan B Skilled Nursing Facility   Patient choice form signed by patient/caregiver N/A   Describe the patient's ability to walk at the present time. Walks with the help of equipment     Maureen Haney RN  Transition Navigator  [phone]

## 2018-01-12 NOTE — PLAN OF CARE
Problem: Patient Care Overview  Goal: Plan of Care Review  Slept well. Afebrile,  No distress noted.

## 2018-01-13 PROBLEM — R50.9 FEVER: Status: RESOLVED | Noted: 2018-01-10 | Resolved: 2018-01-13

## 2018-01-13 LAB
ANION GAP SERPL CALC-SCNC: 8 MMOL/L
BACTERIA BLD CULT: NORMAL
BUN SERPL-MCNC: 21 MG/DL
CALCIUM SERPL-MCNC: 8.5 MG/DL
CHLORIDE SERPL-SCNC: 99 MMOL/L
CO2 SERPL-SCNC: 23 MMOL/L
CREAT SERPL-MCNC: 0.7 MG/DL
ERYTHROCYTE [DISTWIDTH] IN BLOOD BY AUTOMATED COUNT: 12.5 %
EST. GFR  (AFRICAN AMERICAN): >60 ML/MIN/1.73 M^2
EST. GFR  (NON AFRICAN AMERICAN): >60 ML/MIN/1.73 M^2
GLUCOSE SERPL-MCNC: 105 MG/DL
HCT VFR BLD AUTO: 34.8 %
HGB BLD-MCNC: 11.8 G/DL
MCH RBC QN AUTO: 29.3 PG
MCHC RBC AUTO-ENTMCNC: 33.9 G/DL
MCV RBC AUTO: 86 FL
PLATELET # BLD AUTO: 322 K/UL
PMV BLD AUTO: 9.4 FL
POTASSIUM SERPL-SCNC: 4.2 MMOL/L
RBC # BLD AUTO: 4.03 M/UL
SODIUM SERPL-SCNC: 130 MMOL/L
VANCOMYCIN TROUGH SERPL-MCNC: 15.2 UG/ML
WBC # BLD AUTO: 8.65 K/UL

## 2018-01-13 PROCEDURE — 85027 COMPLETE CBC AUTOMATED: CPT

## 2018-01-13 PROCEDURE — 25000003 PHARM REV CODE 250: Performed by: HOSPITALIST

## 2018-01-13 PROCEDURE — 11000001 HC ACUTE MED/SURG PRIVATE ROOM

## 2018-01-13 PROCEDURE — 63600175 PHARM REV CODE 636 W HCPCS: Performed by: HOSPITALIST

## 2018-01-13 PROCEDURE — 80048 BASIC METABOLIC PNL TOTAL CA: CPT

## 2018-01-13 PROCEDURE — A4216 STERILE WATER/SALINE, 10 ML: HCPCS | Performed by: HOSPITALIST

## 2018-01-13 PROCEDURE — 80202 ASSAY OF VANCOMYCIN: CPT

## 2018-01-13 PROCEDURE — 36415 COLL VENOUS BLD VENIPUNCTURE: CPT

## 2018-01-13 RX ADMIN — VANCOMYCIN HYDROCHLORIDE 1000 MG: 1 INJECTION, POWDER, LYOPHILIZED, FOR SOLUTION INTRAVENOUS at 09:01

## 2018-01-13 RX ADMIN — SODIUM CHLORIDE TAB 1 GM 1 G: 1 TAB at 09:01

## 2018-01-13 RX ADMIN — METOPROLOL SUCCINATE 50 MG: 50 TABLET, EXTENDED RELEASE ORAL at 09:01

## 2018-01-13 RX ADMIN — ENOXAPARIN SODIUM 40 MG: 100 INJECTION SUBCUTANEOUS at 04:01

## 2018-01-13 RX ADMIN — SODIUM CHLORIDE, PRESERVATIVE FREE 3 ML: 5 INJECTION INTRAVENOUS at 11:01

## 2018-01-13 RX ADMIN — SODIUM CHLORIDE, PRESERVATIVE FREE 3 ML: 5 INJECTION INTRAVENOUS at 02:01

## 2018-01-13 RX ADMIN — THERA TABS 1 TABLET: TAB at 09:01

## 2018-01-13 RX ADMIN — ASPIRIN 81 MG: 81 TABLET, COATED ORAL at 09:01

## 2018-01-13 RX ADMIN — VANCOMYCIN HYDROCHLORIDE 1000 MG: 1 INJECTION, POWDER, LYOPHILIZED, FOR SOLUTION INTRAVENOUS at 12:01

## 2018-01-13 RX ADMIN — VANCOMYCIN HYDROCHLORIDE 1000 MG: 1 INJECTION, POWDER, LYOPHILIZED, FOR SOLUTION INTRAVENOUS at 04:01

## 2018-01-13 RX ADMIN — SODIUM CHLORIDE, PRESERVATIVE FREE 3 ML: 5 INJECTION INTRAVENOUS at 04:01

## 2018-01-13 NOTE — ASSESSMENT & PLAN NOTE
- Patient with fever on 1/10. Likely nidus is L hand.   - 1/10 Blood Cx x2: MSSA   - 1/11 Blood Cx x1: Staph (sensativity pending)   -1/12 Blood Cx x2: NGTD    1/5 TTE: No vegetations.  1/12 TTE: No vegetations. Consider MARIYA given repeat positive blood cx.    - Due to new onset MSSA bacteremia with repeat cx's positive, recommend MARIYA.  - Recommend switching to Cefazolin IV 2g q8hr given MSSA. May d/c Vancomycin.  - Duration will be determined by negative endocarditis work-up

## 2018-01-13 NOTE — SUBJECTIVE & OBJECTIVE
Interval History: Patients states he feels well today, no complaints. Hand has remained the same, decreased ROM and stable eschar. No fevers, chills, chest pain, abdominal pain.    Review of Systems   Constitutional: Negative for chills and fever.   HENT: Negative for hearing loss and tinnitus.    Respiratory: Negative for cough and shortness of breath.    Cardiovascular: Negative for chest pain and palpitations.   Gastrointestinal: Negative for abdominal pain, diarrhea, nausea and vomiting.   Genitourinary: Negative for dysuria and hematuria.   Musculoskeletal: Negative for arthralgias and myalgias.   Neurological: Negative for syncope and headaches.   Psychiatric/Behavioral: Negative for agitation and confusion.     Objective:     Vital Signs (Most Recent):  Temp: 99 °F (37.2 °C) (01/13/18 0717)  Pulse: 69 (01/13/18 0717)  Resp: 19 (01/13/18 0717)  BP: 116/78 (01/13/18 0717)  SpO2: 98 % (01/13/18 0730) Vital Signs (24h Range):  Temp:  [98.5 °F (36.9 °C)-99.2 °F (37.3 °C)] 99 °F (37.2 °C)  Pulse:  [69-84] 69  Resp:  [16-19] 19  SpO2:  [97 %-98 %] 98 %  BP: (116-133)/(72-87) 116/78     Weight: 74.9 kg (165 lb 2 oz)  Body mass index is 28.34 kg/m².    Estimated Creatinine Clearance: 106.6 mL/min (based on SCr of 0.7 mg/dL).    Physical Exam   Constitutional: He is oriented to person, place, and time. He appears well-developed and well-nourished.   HENT:   Head: Normocephalic and atraumatic.   Eyes: Conjunctivae and EOM are normal. Pupils are equal, round, and reactive to light.   Neck: Normal range of motion. Neck supple.   Cardiovascular: Normal rate and regular rhythm.    Pulmonary/Chest: Effort normal and breath sounds normal.   Abdominal: Soft. Bowel sounds are normal.   Musculoskeletal: Normal range of motion.   Black eschar on L palm, 4cm x 3cm   Neurological: He is alert and oriented to person, place, and time.   Skin: Skin is dry.   Psychiatric: He has a normal mood and affect. His behavior is normal.        Significant Labs:   Blood Culture:   Recent Labs  Lab 01/05/18  0510 01/05/18  0522 01/10/18  1742 01/11/18  1756   LABBLOO No growth after 5 days. No growth after 5 days. Gram stain aer bottle: Gram positive cocci in clusters resembling Staph   Results called to and read back by:Yolanda Estrada 01/11/2018  15:49  STAPHYLOCOCCUS AUREUSID consult required at [facility].For susceptibility see order # [id number]  Gram stain aer bottle: Gram positive cocci in clusters resembling Staph   Results called to and read back by: Yolanda Estrada 01/11/2018  15:48  STAPHYLOCOCCUS AUREUSID consult required at [facility]. Gram stain aer bottle: Gram positive cocci in clusters resembling Staph   Results called to and read back by: Jose Roberto Rosado RN  01/12/2018  20:14  STAPHYLOCOCCUS AUREUSID consult required at [facility].For susceptibility see order # [id number]  No Growth to date  No Growth to date       Significant Imaging: US L Hand- no abscess or fluid collection

## 2018-01-13 NOTE — ASSESSMENT & PLAN NOTE
- On exam, 4cm x 3cm area of cellulitis, black in color. No discharge/drainage  - As per patient, started on day of admission 1/5  - US L Hand: no fluid collections    - Recommend Ortho Hand Surgery Consult  - This is likely nidus for MSSA.

## 2018-01-13 NOTE — PROGRESS NOTES
[facility]  Infectious Disease  Progress Note    Patient Name: Spenser Rabago  MRN: [id number]  Admission Date: 1/5/2018  Length of Stay: 8 days  Attending Physician: Carlos Henderson MD  Primary Care Provider: Vladimir Apple MD    Isolation Status: No active isolations  Assessment/Plan:      Bacteremia due to Staphylococcus aureus    - Patient with fever on 1/10. Likely nidus is L hand.   - 1/10 Blood Cx x2: MSSA   - 1/11 Blood Cx x1: Staph (sensativity pending)   -1/12 Blood Cx x2: NGTD    1/5 TTE: No vegetations.  1/12 TTE: No vegetations. Consider MARIYA given repeat positive blood cx.    - Due to new onset MSSA bacteremia with repeat cx's positive, recommend MARIYA.  - Recommend switching to Cefazolin IV 2g q8hr given MSSA. May d/c Vancomycin.  - Duration will be determined by negative endocarditis work-up          Cellulitis of left hand    - On exam, 4cm x 3cm area of cellulitis, black in color. No discharge/drainage  - As per patient, started on day of admission 1/5  - US L Hand: no fluid collections    - This is likely nidus for MSSA.  - Recommend Ortho Hand Surgery Outpatient vs Consult              Anticipated Disposition: Pending negative blood cx, pending MARIYA    Thank you for your consult. I will follow-up with patient. Please contact us if you have any additional questions.    Sekou Carrasco MD  Infectious Disease  [facility]    Subjective:     Principal Problem:Non-traumatic rhabdomyolysis    HPI:  57 y.o. male who  has a past medical history of ADD (attention deficit disorder); Depression; Headache(784.0); Hypertension; and Migraines presents with Fall x1 day    Patient presented to ED after a fall and was found to be in rhabdomyolysis. MRI brain was performed in which he was found to have brain injuries consistent with CO poisoning. On 1/10, as patient was being prepared to be discharged to IP rehab, he spiked a fever. Blood cx's were drawn and were positive for  "Staph.    On exam, patient has an area of 4cm x 3cm area of cellulitis that is dark black in odor, no discharge/drainage, with malodor. He states that this wound first became apparent at admission on 1/5. Patient admits to feeling tired and  "down," denies fever, chills, chest pain, abdominal pain.  Interval History: Patients states he feels well today, no complaints. Hand has remained the same, decreased ROM and stable eschar. No fevers, chills, chest pain, abdominal pain.    Review of Systems   Constitutional: Negative for chills and fever.   HENT: Negative for hearing loss and tinnitus.    Respiratory: Negative for cough and shortness of breath.    Cardiovascular: Negative for chest pain and palpitations.   Gastrointestinal: Negative for abdominal pain, diarrhea, nausea and vomiting.   Genitourinary: Negative for dysuria and hematuria.   Musculoskeletal: Negative for arthralgias and myalgias.   Neurological: Negative for syncope and headaches.   Psychiatric/Behavioral: Negative for agitation and confusion.     Objective:     Vital Signs (Most Recent):  Temp: 99 °F (37.2 °C) (01/13/18 0717)  Pulse: 69 (01/13/18 0717)  Resp: 19 (01/13/18 0717)  BP: 116/78 (01/13/18 0717)  SpO2: 98 % (01/13/18 0730) Vital Signs (24h Range):  Temp:  [98.5 °F (36.9 °C)-99.2 °F (37.3 °C)] 99 °F (37.2 °C)  Pulse:  [69-84] 69  Resp:  [16-19] 19  SpO2:  [97 %-98 %] 98 %  BP: (116-133)/(72-87) 116/78     Weight: 74.9 kg (165 lb 2 oz)  Body mass index is 28.34 kg/m².    Estimated Creatinine Clearance: 106.6 mL/min (based on SCr of 0.7 mg/dL).    Physical Exam   Constitutional: He is oriented to person, place, and time. He appears well-developed and well-nourished.   HENT:   Head: Normocephalic and atraumatic.   Eyes: Conjunctivae and EOM are normal. Pupils are equal, round, and reactive to light.   Neck: Normal range of motion. Neck supple.   Cardiovascular: Normal rate and regular rhythm.    Pulmonary/Chest: Effort normal and breath sounds " normal.   Abdominal: Soft. Bowel sounds are normal.   Musculoskeletal: Normal range of motion.   Black eschar on L palm, 4cm x 3cm   Neurological: He is alert and oriented to person, place, and time.   Skin: Skin is dry.   Psychiatric: He has a normal mood and affect. His behavior is normal.       Significant Labs:   Blood Culture:   Recent Labs  Lab 01/05/18  0510 01/05/18  0522 01/10/18  1742 01/11/18  1756   LABBLOO No growth after 5 days. No growth after 5 days. Gram stain aer bottle: Gram positive cocci in clusters resembling Staph   Results called to and read back by:Yolanda Estrada 01/11/2018  15:49  STAPHYLOCOCCUS AUREUSID consult required at [facility].For susceptibility see order # [id number]  Gram stain aer bottle: Gram positive cocci in clusters resembling Staph   Results called to and read back by: Yolanda Estrada 01/11/2018  15:48  STAPHYLOCOCCUS AUREUSID consult required at [facility]. Gram stain aer bottle: Gram positive cocci in clusters resembling Staph   Results called to and read back by: Jose Roberto Rosado RN  01/12/2018  20:14  STAPHYLOCOCCUS AUREUSID consult required at [facility].For susceptibility see order # [id number]  No Growth to date  No Growth to date       Significant Imaging: US L Hand- no abscess or fluid collection

## 2018-01-13 NOTE — PROGRESS NOTES
[facility] Hospital Medicine  Progress Note    Patient Name: Spenser Rabago  MRN: [id number]  Patient Class: IP- Inpatient   Admission Date: 1/5/2018  Length of Stay: 8 days  Attending Physician: Carlos Henderson MD  Primary Care Provider: Vladimir Apple MD        Subjective:     Principal Problem:Non-traumatic rhabdomyolysis    HPI:  Spenser Rabago is a 57 y.o. white man with hypertension, migraine headaches, attention deficit hyperactivity disorder, and depression.  He lives in [place], [place].  His primary care physician is Dr. Vladimir Apple.  His neurologist is Dr. Frank Lancaster.   He was taken by EMS to [facility] on 1/5/18 after falling and being on the floor for at least 6 hours.  Four days prior to this, a box fell on his right foot and he had pain and difficulty walking.  He was falling a lot after that.  He also had decreased appetite and oral intake the past week.  He was found to have dehydration (sodium 127, BUN 47, urine with ketones and hyaline casts), high anion gap acidosis (bicarbonate 18, anion gap 20), and rhabdomyolysis (CPK 1759).  He was admitted to [facility].    Hospital Course:  Rhabdomyolysis resolved with IV fluids.  He was found to have right greater than left lower extremity weakness.  MRI showed bilateral hyperintensity in the globus pallidi and throughout the supratentorial white matter.  Neurology was concerned for carbon monoxide exposure/toxicity.  His carboxyhemoglobin was within normal limits.  Neurology recommended hyperbaric oxygen therapy, but director of hyperbarics Dr. Alaina Kuo said that was only indicated in acute CO poisoning.  His family went to his house and found black mold in the vents.  Inpatient rehab was sought and he waited around for a few days.  Lisinopril was stopped because his blood pressures were controlled on metoprolol alone.  Alprazolam was stopped because he no longer had anxiety likely  due to the carbon monoxide brain damage.              He was set up for discharge to inpatient rehab, but on 1/10/18, he suddenly developed new fever.  Urinalysis showed no evidence for UTI.  Chest x-ray showed no pneumonia.  Influenza swab was negative.  CBC showed new leukocytosis.  Procalcitonin was elevated.  He denied any specific symptoms except for mild cough.  His left hand distal to a peripheral IV was swollen, with mild redness and tenderness at the IV site.  Doxycycline was started for suspected cellulitis.  Peripheral IVs were removed.  The blood cultures grew Staphylococcus aureus so a new peripheral IV was placed, doxycycline was changed to IV vancomycin, and Infectious Disease was consulted.  Leukocytosis resolved after initiating antibiotics.  Infectious Disease noted the hand cellulitis, but Mr. Rabago told them that it had been there since admission.  They recommended repeating the echocardiogram, after hearing a systolic murmur.  Transthoracic echocardiogram showed no evidence of vegetation.  Tissue ultrasound of the left hand was unremarkable.      Interval History: No complaints.  Explained ongoing evaluation for the new bacteremia and he is understanding.    Review of Systems   Respiratory: Negative for shortness of breath.    Cardiovascular: Negative for chest pain and palpitations.   Gastrointestinal: Negative for nausea and vomiting.     Objective:     Vital Signs (Most Recent):  Temp: 98.6 °F (37 °C) (01/13/18 1100)  Pulse: 73 (01/13/18 1100)  Resp: 19 (01/13/18 1100)  BP: 111/68 (01/13/18 1100)  SpO2: 98 % (01/13/18 0730) Vital Signs (24h Range):  Temp:  [98.5 °F (36.9 °C)-99.2 °F (37.3 °C)] 98.6 °F (37 °C)  Pulse:  [69-84] 73  Resp:  [16-19] 19  SpO2:  [97 %-98 %] 98 %  BP: (111-133)/(68-87) 111/68     Weight: 74.9 kg (165 lb 2 oz)  Body mass index is 28.34 kg/m².    Intake/Output Summary (Last 24 hours) at 01/13/18 1232  Last data filed at 01/13/18 0600   Gross per 24 hour   Intake               740 ml   Output             1205 ml   Net             -465 ml      Physical Exam   Constitutional: He is oriented to person, place, and time. He appears well-developed and well-nourished. No distress.   Pulmonary/Chest: Effort normal. No respiratory distress.   Musculoskeletal:   Left hand redness and swelling improved, black palm bruise   Neurological: He is alert and oriented to person, place, and time.   Skin: Skin is warm and dry.   Psychiatric: He has a normal mood and affect.   Nursing note and vitals reviewed.      Significant Labs:   CBC:     Recent Labs  Lab 01/12/18  1009 01/13/18  0614   WBC 8.71 8.65   HGB 13.1* 11.8*   HCT 38.4* 34.8*    322     CMP:     Recent Labs  Lab 01/12/18  1009 01/13/18  0614   * 130*   K 3.8 4.2   CL 97 99   CO2 21* 23   * 105   BUN 28* 21*   CREATININE 0.8 0.7   CALCIUM 8.6* 8.5*   ANIONGAP 12 8   EGFRNONAA >60 >60     Magnesium:   No results for input(s): MG in the last 48 hours.    Significant Imaging: I have reviewed all pertinent imaging results/findings within the past 24 hours.   2D echo only 1/12/18:    1 - Normal left ventricular systolic function (EF 60-65%).     2 - No wall motion abnormalities.     3 - Concentric remodeling.     4 - Normal right ventricular systolic function .   Intracavitary: There is no evidence of intracavitary mass or thrombus. There is no evidence of vegetation.         Assessment/Plan:      Bacteremia due to Staphylococcus aureus    Probably caused by abrasions from his fall.  Started vancomycin.  Appreciate ID.          Cellulitis of left hand    Looks better.  On vancomycin.          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  Appreciate Neurology.  Hyperbaric oxygen therapy not indicated per Dr. Kuo.  Inpatient rehab.        Attention deficit hyperactivity disorder (ADHD)    Holding Adderall for now.         Essential hypertension    Resume home  Toprol XL.  Holding lisinopril.  Monitor.         Depression    Will resume trazodone and Wellbutrin in coming days.           Mixed migraine and muscle contraction headache    Currently has alprazolam, baclofen, Percocet, Opana ER, tizanadine, and topiramate on his med list in our system.  He could not confirm medications at this time.  His primary care physician and neurologist are [facility] so should be correct.  Not complaining of any headaches or other pain at this time.           VTE Risk Mitigation         Ordered     enoxaparin injection 40 mg  Daily     Route:  Subcutaneous        01/05/18 1000     High Risk of VTE  Once      01/05/18 1000     Reason for No Pharmacological VTE Prophylaxis  Once      01/05/18 1000     Place sequential compression device  Until discontinued      01/05/18 1000              Carlos Henderson MD  Department of Hospital Medicine   [facility]

## 2018-01-13 NOTE — SUBJECTIVE & OBJECTIVE
Interval History: No complaints.  Explained ongoing evaluation for the new bacteremia and he is understanding.    Review of Systems   Respiratory: Negative for shortness of breath.    Cardiovascular: Negative for chest pain and palpitations.   Gastrointestinal: Negative for nausea and vomiting.     Objective:     Vital Signs (Most Recent):  Temp: 98.6 °F (37 °C) (01/13/18 1100)  Pulse: 73 (01/13/18 1100)  Resp: 19 (01/13/18 1100)  BP: 111/68 (01/13/18 1100)  SpO2: 98 % (01/13/18 0730) Vital Signs (24h Range):  Temp:  [98.5 °F (36.9 °C)-99.2 °F (37.3 °C)] 98.6 °F (37 °C)  Pulse:  [69-84] 73  Resp:  [16-19] 19  SpO2:  [97 %-98 %] 98 %  BP: (111-133)/(68-87) 111/68     Weight: 74.9 kg (165 lb 2 oz)  Body mass index is 28.34 kg/m².    Intake/Output Summary (Last 24 hours) at 01/13/18 1232  Last data filed at 01/13/18 0600   Gross per 24 hour   Intake              740 ml   Output             1205 ml   Net             -465 ml      Physical Exam   Constitutional: He is oriented to person, place, and time. He appears well-developed and well-nourished. No distress.   Pulmonary/Chest: Effort normal. No respiratory distress.   Musculoskeletal:   Left hand redness and swelling improved, black palm bruise   Neurological: He is alert and oriented to person, place, and time.   Skin: Skin is warm and dry.   Psychiatric: He has a normal mood and affect.   Nursing note and vitals reviewed.      Significant Labs:   CBC:     Recent Labs  Lab 01/12/18  1009 01/13/18  0614   WBC 8.71 8.65   HGB 13.1* 11.8*   HCT 38.4* 34.8*    322     CMP:     Recent Labs  Lab 01/12/18  1009 01/13/18  0614   * 130*   K 3.8 4.2   CL 97 99   CO2 21* 23   * 105   BUN 28* 21*   CREATININE 0.8 0.7   CALCIUM 8.6* 8.5*   ANIONGAP 12 8   EGFRNONAA >60 >60     Magnesium:   No results for input(s): MG in the last 48 hours.    Significant Imaging: I have reviewed all pertinent imaging results/findings within the past 24 hours.   2D echo only  1/12/18:    1 - Normal left ventricular systolic function (EF 60-65%).     2 - No wall motion abnormalities.     3 - Concentric remodeling.     4 - Normal right ventricular systolic function .   Intracavitary: There is no evidence of intracavitary mass or thrombus. There is no evidence of vegetation.

## 2018-01-14 LAB
ANION GAP SERPL CALC-SCNC: 9 MMOL/L
BACTERIA BLD CULT: NORMAL
BUN SERPL-MCNC: 15 MG/DL
CALCIUM SERPL-MCNC: 9 MG/DL
CHLORIDE SERPL-SCNC: 100 MMOL/L
CO2 SERPL-SCNC: 23 MMOL/L
CREAT SERPL-MCNC: 1.1 MG/DL
ERYTHROCYTE [DISTWIDTH] IN BLOOD BY AUTOMATED COUNT: 12.4 %
EST. GFR  (AFRICAN AMERICAN): >60 ML/MIN/1.73 M^2
EST. GFR  (NON AFRICAN AMERICAN): >60 ML/MIN/1.73 M^2
GLUCOSE SERPL-MCNC: 104 MG/DL
HCT VFR BLD AUTO: 34.1 %
HGB BLD-MCNC: 11.4 G/DL
MCH RBC QN AUTO: 28.8 PG
MCHC RBC AUTO-ENTMCNC: 33.4 G/DL
MCV RBC AUTO: 86 FL
PLATELET # BLD AUTO: 325 K/UL
PMV BLD AUTO: 9.4 FL
POTASSIUM SERPL-SCNC: 4 MMOL/L
RBC # BLD AUTO: 3.96 M/UL
SODIUM SERPL-SCNC: 132 MMOL/L
WBC # BLD AUTO: 6.6 K/UL

## 2018-01-14 PROCEDURE — 80048 BASIC METABOLIC PNL TOTAL CA: CPT

## 2018-01-14 PROCEDURE — 25000003 PHARM REV CODE 250: Performed by: HOSPITALIST

## 2018-01-14 PROCEDURE — 80074 ACUTE HEPATITIS PANEL: CPT

## 2018-01-14 PROCEDURE — 63600175 PHARM REV CODE 636 W HCPCS: Performed by: HOSPITALIST

## 2018-01-14 PROCEDURE — 85027 COMPLETE CBC AUTOMATED: CPT

## 2018-01-14 PROCEDURE — 97116 GAIT TRAINING THERAPY: CPT

## 2018-01-14 PROCEDURE — A4216 STERILE WATER/SALINE, 10 ML: HCPCS | Performed by: HOSPITALIST

## 2018-01-14 PROCEDURE — 11000001 HC ACUTE MED/SURG PRIVATE ROOM

## 2018-01-14 RX ADMIN — SODIUM CHLORIDE, PRESERVATIVE FREE 3 ML: 5 INJECTION INTRAVENOUS at 02:01

## 2018-01-14 RX ADMIN — VANCOMYCIN HYDROCHLORIDE 1000 MG: 1 INJECTION, POWDER, LYOPHILIZED, FOR SOLUTION INTRAVENOUS at 07:01

## 2018-01-14 RX ADMIN — METOPROLOL SUCCINATE 50 MG: 50 TABLET, EXTENDED RELEASE ORAL at 09:01

## 2018-01-14 RX ADMIN — SODIUM CHLORIDE, PRESERVATIVE FREE 3 ML: 5 INJECTION INTRAVENOUS at 10:01

## 2018-01-14 RX ADMIN — SODIUM CHLORIDE TAB 1 GM 1 G: 1 TAB at 10:01

## 2018-01-14 RX ADMIN — ENOXAPARIN SODIUM 40 MG: 100 INJECTION SUBCUTANEOUS at 04:01

## 2018-01-14 RX ADMIN — SODIUM CHLORIDE, PRESERVATIVE FREE 3 ML: 5 INJECTION INTRAVENOUS at 04:01

## 2018-01-14 RX ADMIN — THERA TABS 1 TABLET: TAB at 09:01

## 2018-01-14 RX ADMIN — SODIUM CHLORIDE TAB 1 GM 1 G: 1 TAB at 09:01

## 2018-01-14 RX ADMIN — VANCOMYCIN HYDROCHLORIDE 1000 MG: 1 INJECTION, POWDER, LYOPHILIZED, FOR SOLUTION INTRAVENOUS at 12:01

## 2018-01-14 RX ADMIN — VANCOMYCIN HYDROCHLORIDE 1000 MG: 1 INJECTION, POWDER, LYOPHILIZED, FOR SOLUTION INTRAVENOUS at 04:01

## 2018-01-14 RX ADMIN — SODIUM CHLORIDE, PRESERVATIVE FREE 3 ML: 5 INJECTION INTRAVENOUS at 06:01

## 2018-01-14 RX ADMIN — ASPIRIN 81 MG: 81 TABLET, COATED ORAL at 09:01

## 2018-01-14 NOTE — PLAN OF CARE
Problem: Physical Therapy Goal  Goal: Physical Therapy Goal  Goals to be met by: 18     Patient will increase functional independence with mobility by performin. Supine to sit with Stand-by Assistance  2. Sit to supine with Stand-by Assistance MET 2018  3. Sit to stand transfer with Moderate Assistance MET 2018  4. Bed to chair transfer with Moderate Assistance using least restirctive AD MET 2018  5.  Assess gait     Updated Goals:  1. Supine<>sit with SBA  2. Sit to stand to SBA with RW  3. Bed to chair transfers with RW and CGA  4. Gait x 20 ft with RW and CGA      Outcome: Ongoing (interventions implemented as appropriate)      Pt continues to work toward updated goals.

## 2018-01-14 NOTE — PLAN OF CARE
Up in chair per PT.right foot numbness and mild foot drop noted . C/o right foot pain when touched . Left palm still deeply bruised ,swollen and tender. Flat affect persists .Incontinent of urine on ad off ,despote urinal at bedside within reach.Worried about reghab placement and possible disability.

## 2018-01-14 NOTE — PROGRESS NOTES
[facility] Medicine  Progress Note    Patient Name: Spenser Rabago  MRN: [id number]  Patient Class: IP- Inpatient   Admission Date: 1/5/2018  Length of Stay: 9 days  Attending Physician: Carlos Henderson MD  Primary Care Provider: Vladimir Apple MD        Subjective:     Principal Problem:Non-traumatic rhabdomyolysis    HPI:  Spenser Rabago is a 57 y.o. white man with hypertension, migraine headaches, attention deficit hyperactivity disorder, and depression.  He lives in [place], [place].  His primary care physician is Dr. Vladimir Apple.  His neurologist is Dr. Frank Lancaster.   He was taken by EMS to [facility] on 1/5/18 after falling and being on the floor for at least 6 hours.  Four days prior to this, a box fell on his right foot and he had pain and difficulty walking.  He was falling a lot after that.  He also had decreased appetite and oral intake the past week.  He was found to have dehydration (sodium 127, BUN 47, urine with ketones and hyaline casts), high anion gap acidosis (bicarbonate 18, anion gap 20), and rhabdomyolysis (CPK 1759).  He was admitted to [facility].    Hospital Course:  Rhabdomyolysis resolved with IV fluids.  He was found to have right greater than left lower extremity weakness.  MRI showed bilateral hyperintensity in the globus pallidi and throughout the supratentorial white matter.  Neurology was concerned for carbon monoxide exposure/toxicity.  His carboxyhemoglobin was within normal limits.  Neurology recommended hyperbaric oxygen therapy, but director of hyperbarics Dr. Alaina Kuo said that was only indicated in acute CO poisoning.  His family went to his house and found black mold in the vents.  Inpatient rehab was sought and he waited around for a few days.  Lisinopril was stopped because his blood pressures were controlled on metoprolol alone.  Alprazolam was stopped because he no longer had anxiety likely  due to the carbon monoxide brain damage.              He was set up for discharge to inpatient rehab, but on 1/10/18, he suddenly developed new fever.  Urinalysis showed no evidence for UTI.  Chest x-ray showed no pneumonia.  Influenza swab was negative.  CBC showed new leukocytosis.  Procalcitonin was elevated.  He denied any specific symptoms except for mild cough.  His left hand distal to a peripheral IV was swollen, with mild redness and tenderness at the IV site.  Doxycycline was started for suspected cellulitis.  Peripheral IVs were removed.  The blood cultures grew Staphylococcus aureus so a new peripheral IV was placed, doxycycline was changed to IV vancomycin, and Infectious Disease was consulted.  Leukocytosis resolved after initiating antibiotics.  Infectious Disease noted the hand cellulitis, but Mr. Rabago told them that it had been there since admission.  They recommended repeating the echocardiogram, after hearing a systolic murmur.  Transthoracic echocardiogram showed no evidence of vegetation.  Tissue ultrasound of the left hand was unremarkable.  Infectious Disease recommended transesophageal echocardiogram to evaluate for endocarditis.      Interval History: No acute events.    Review of Systems   Respiratory: Negative for shortness of breath.    Cardiovascular: Negative for chest pain and palpitations.   Gastrointestinal: Negative for nausea and vomiting.     Objective:     Vital Signs (Most Recent):  Temp: 99.3 °F (37.4 °C) (01/14/18 0908)  Pulse: 77 (01/14/18 1225)  Resp: 19 (01/14/18 1225)  BP: 123/79 (01/14/18 1225)  SpO2: 97 % (01/14/18 0730) Vital Signs (24h Range):  Temp:  [98.3 °F (36.8 °C)-99.3 °F (37.4 °C)] 99.3 °F (37.4 °C)  Pulse:  [67-77] 77  Resp:  [16-19] 19  SpO2:  [97 %] 97 %  BP: (117-128)/(70-79) 123/79     Weight: 74.9 kg (165 lb 2 oz)  Body mass index is 28.34 kg/m².    Intake/Output Summary (Last 24 hours) at 01/14/18 1411  Last data filed at 01/14/18 1230   Gross per 24 hour    Intake             1055 ml   Output             1550 ml   Net             -495 ml      Physical Exam   Constitutional: He is oriented to person, place, and time. He appears well-developed and well-nourished. No distress.   Pulmonary/Chest: Effort normal. No respiratory distress.   Musculoskeletal:   Left hand redness and swelling improved, black palm bruise   Neurological: He is alert and oriented to person, place, and time.   Skin: Skin is warm and dry.   Psychiatric: He has a normal mood and affect.   Nursing note and vitals reviewed.      Significant Labs:   CBC:     Recent Labs  Lab 01/13/18 0614 01/14/18  0602   WBC 8.65 6.60   HGB 11.8* 11.4*   HCT 34.8* 34.1*    325     CMP:     Recent Labs  Lab 01/13/18 0614 01/14/18 0602   * 132*   K 4.2 4.0   CL 99 100   CO2 23 23    104   BUN 21* 15   CREATININE 0.7 1.1   CALCIUM 8.5* 9.0   ANIONGAP 8 9   EGFRNONAA >60 >60     Magnesium:   No results for input(s): MG in the last 48 hours.    Significant Imaging: I have reviewed all pertinent imaging results/findings within the past 24 hours.     Assessment/Plan:      Bacteremia due to Staphylococcus aureus    Probably caused by abrasions from his fall.  Started vancomycin.  Appreciate ID.  Consult Cardiology for MARIYA.          Cellulitis of left hand    Looks better.  On vancomycin.          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  Appreciate Neurology.  Hyperbaric oxygen therapy not indicated per Dr. Kuo.  Inpatient rehab.        Attention deficit hyperactivity disorder (ADHD)    Holding Adderall for now.         Essential hypertension    Resume home Toprol XL.  Holding lisinopril.  Monitor.         Depression    Will resume trazodone and Wellbutrin in coming days.           Mixed migraine and muscle contraction headache    Currently has alprazolam, baclofen, Percocet, Opana ER, tizanadine, and topiramate on his med list in our  system.  He could not confirm medications at this time.  His primary care physician and neurologist are [facility] so should be correct.  Not complaining of any headaches or other pain at this time.           VTE Risk Mitigation         Ordered     enoxaparin injection 40 mg  Daily     Route:  Subcutaneous        01/05/18 1000     High Risk of VTE  Once      01/05/18 1000     Reason for No Pharmacological VTE Prophylaxis  Once      01/05/18 1000     Place sequential compression device  Until discontinued      01/05/18 1000              Carlos Henderson MD  Department of Hospital Medicine   [facility]

## 2018-01-14 NOTE — PT/OT/SLP PROGRESS
Physical Therapy Treatment    Patient Name:  Spenser Rabago   MRN:  [id number]    Recommendations:     Discharge Recommendations:  rehabilitation facility   Discharge Equipment Recommendations:  (Defer to IPR)   Barriers to discharge: Decreased caregiver support    Assessment:     Spenser Rabago is a 57 y.o. male admitted with a medical diagnosis of Non-traumatic rhabdomyolysis.  He presents with the following impairments/functional limitations:  weakness, impaired endurance, impaired self care skills, impaired functional mobilty, gait instability, impaired balance, decreased coordination, decreased lower extremity function, decreased upper extremity function, decreased safety awareness, decreased ROM. Pt remains unsteady with ambulation using RW.  Lacking active dorsiflexion on right foot. Possible right foot drop which was discussed with nurse. Requires max encouragement for out of bed activity. Will continue PT to address all impairments listed above.    Rehab Prognosis:  good; patient would benefit from acute skilled PT services to address these deficits and reach maximum level of function.      Recent Surgery: * No surgery found *      Plan:     During this hospitalization, patient to be seen 6 x/week to address the above listed problems via gait training, therapeutic activities, therapeutic exercises, neuromuscular re-education  · Plan of Care Expires:  02/04/18   Plan of Care Reviewed with: patient    Subjective     Communicated with nurse prior to session.  Patient found supine upon PT entry to room, agreeable to treatment only after max encouragemnt.      Chief Complaint: None voiced  Patient comments/goals: Did not express any  Pain/Comfort:  · Pain Rating 1:  (did not express any)    Patients cultural, spiritual, Oriental orthodox conflicts given the current situation: None verbalized to PTA    Objective:     Patient found with: bed alarm     General Precautions: Standard, fall   Orthopedic Precautions:N/A    Braces: N/A     Functional Mobility:  · Bed Mobility:     · Scooting: stand by assistance  · Supine to Sit: stand by assistance  · Transfers:     · Sit to Stand:  minimum assistance with rolling walker  · Gait: by 2 trials. 1st trial ~3-4 steps fwd and bwd, 2nd trial ~12-18ft in room. with RW and min/CGA  · Balance: static sitting=G+, static standing=F+, dynamic standing=P      AM-PAC 6 CLICK MOBILITY  Turning over in bed (including adjusting bedclothes, sheets and blankets)?: 4  Sitting down on and standing up from a chair with arms (e.g., wheelchair, bedside commode, etc.): 3  Moving from lying on back to sitting on the side of the bed?: 3  Moving to and from a bed to a chair (including a wheelchair)?: 3  Need to walk in hospital room?: 3  Climbing 3-5 steps with a railing?: 1  Total Score: 17       Therapeutic Activities and Exercises:   Pt performed 2 sit to stand trials, 1 from EOB and 1 from bedside chair with RW and min A. Ambulation training by 2 trials with RW and min/close CGA. 1 st trial ~3-4 steps fwd and 3-4 steps bwd, 2nd trial ~12-18 ft in room. Right foot lacking active dorsiflexion. Declined seated therapeutic exercises as lunch just arrived. Set up to eat lunch.    Patient left up in chair with all lines intact, call button in reach and nurse notified..    GOALS:    Physical Therapy Goals        Problem: Physical Therapy Goal    Goal Priority Disciplines Outcome Goal Variances Interventions   Physical Therapy Goal     PT/OT, PT Ongoing (interventions implemented as appropriate)     Description:  Goals to be met by: 18     Patient will increase functional independence with mobility by performin. Supine to sit with Stand-by Assistance  2. Sit to supine with Stand-by Assistance MET 2018  3. Sit to stand transfer with Moderate Assistance MET 2018  4. Bed to chair transfer with Moderate Assistance using least restirctive AD MET 2018  5.  Assess gait     Updated Goals:  1.  Supine<>sit with SBA  2. Sit to stand to SBA with RW  3. Bed to chair transfers with RW and CGA  4. Gait x 20 ft with RW and CGA                       Time Tracking:     PT Received On: 01/14/18  PT Start Time: 1206     PT Stop Time: 1224  PT Total Time (min): 18 min     Billable Minutes: Gait Training 18    Treatment Type: Treatment  PT/PTA: PTA     PTA Visit Number: 3     Jessenia Yeager, PTA  01/14/2018

## 2018-01-14 NOTE — PLAN OF CARE
Continued on fall risk monitoring. Seen by Dr Henderson. Back dressing changed ,new mepolex applied.  Minimal amount of drainage noted Right foot numbness present, left foot painful if touched. Unable to place scds,Bilateral E-Z boots on The patient left the office before the visit was finished. Left palm remains darkly discolored and firm to touch. Dr Henderson aware.

## 2018-01-14 NOTE — SUBJECTIVE & OBJECTIVE
Interval History: No acute events.    Review of Systems   Respiratory: Negative for shortness of breath.    Cardiovascular: Negative for chest pain and palpitations.   Gastrointestinal: Negative for nausea and vomiting.     Objective:     Vital Signs (Most Recent):  Temp: 99.3 °F (37.4 °C) (01/14/18 0908)  Pulse: 77 (01/14/18 1225)  Resp: 19 (01/14/18 1225)  BP: 123/79 (01/14/18 1225)  SpO2: 97 % (01/14/18 0730) Vital Signs (24h Range):  Temp:  [98.3 °F (36.8 °C)-99.3 °F (37.4 °C)] 99.3 °F (37.4 °C)  Pulse:  [67-77] 77  Resp:  [16-19] 19  SpO2:  [97 %] 97 %  BP: (117-128)/(70-79) 123/79     Weight: 74.9 kg (165 lb 2 oz)  Body mass index is 28.34 kg/m².    Intake/Output Summary (Last 24 hours) at 01/14/18 1411  Last data filed at 01/14/18 1230   Gross per 24 hour   Intake             1055 ml   Output             1550 ml   Net             -495 ml      Physical Exam   Constitutional: He is oriented to person, place, and time. He appears well-developed and well-nourished. No distress.   Pulmonary/Chest: Effort normal. No respiratory distress.   Musculoskeletal:   Left hand redness and swelling improved, black palm bruise   Neurological: He is alert and oriented to person, place, and time.   Skin: Skin is warm and dry.   Psychiatric: He has a normal mood and affect.   Nursing note and vitals reviewed.      Significant Labs:   CBC:     Recent Labs  Lab 01/13/18  0614 01/14/18  0602   WBC 8.65 6.60   HGB 11.8* 11.4*   HCT 34.8* 34.1*    325     CMP:     Recent Labs  Lab 01/13/18  0614 01/14/18  0602   * 132*   K 4.2 4.0   CL 99 100   CO2 23 23    104   BUN 21* 15   CREATININE 0.7 1.1   CALCIUM 8.5* 9.0   ANIONGAP 8 9   EGFRNONAA >60 >60     Magnesium:   No results for input(s): MG in the last 48 hours.    Significant Imaging: I have reviewed all pertinent imaging results/findings within the past 24 hours.

## 2018-01-14 NOTE — ASSESSMENT & PLAN NOTE
Probably caused by abrasions from his fall.  Started vancomycin.  Appreciate ID.  Consult Cardiology for MARIYA.

## 2018-01-15 ENCOUNTER — ANESTHESIA (OUTPATIENT)
Dept: CARDIOLOGY | Facility: HOSPITAL | Age: 58
DRG: 918 | End: 2018-01-15
Payer: COMMERCIAL

## 2018-01-15 ENCOUNTER — SURGERY (OUTPATIENT)
Age: 58
End: 2018-01-15

## 2018-01-15 ENCOUNTER — ANESTHESIA EVENT (OUTPATIENT)
Dept: CARDIOLOGY | Facility: HOSPITAL | Age: 58
DRG: 918 | End: 2018-01-15
Payer: COMMERCIAL

## 2018-01-15 PROBLEM — E87.1 DEHYDRATION WITH HYPONATREMIA: Status: RESOLVED | Noted: 2018-01-05 | Resolved: 2018-01-15

## 2018-01-15 PROBLEM — E86.0 DEHYDRATION WITH HYPONATREMIA: Status: RESOLVED | Noted: 2018-01-05 | Resolved: 2018-01-15

## 2018-01-15 LAB
ANION GAP SERPL CALC-SCNC: 8 MMOL/L
BUN SERPL-MCNC: 18 MG/DL
CALCIUM SERPL-MCNC: 9.1 MG/DL
CHLORIDE SERPL-SCNC: 100 MMOL/L
CO2 SERPL-SCNC: 24 MMOL/L
CREAT SERPL-MCNC: 0.7 MG/DL
ERYTHROCYTE [DISTWIDTH] IN BLOOD BY AUTOMATED COUNT: 12.5 %
EST. GFR  (AFRICAN AMERICAN): >60 ML/MIN/1.73 M^2
EST. GFR  (NON AFRICAN AMERICAN): >60 ML/MIN/1.73 M^2
GLUCOSE SERPL-MCNC: 106 MG/DL
HAV IGM SERPL QL IA: NEGATIVE
HBV CORE IGM SERPL QL IA: NEGATIVE
HBV SURFACE AG SERPL QL IA: NEGATIVE
HCT VFR BLD AUTO: 35.1 %
HCV AB SERPL QL IA: NEGATIVE
HGB BLD-MCNC: 12 G/DL
MCH RBC QN AUTO: 29.3 PG
MCHC RBC AUTO-ENTMCNC: 34.2 G/DL
MCV RBC AUTO: 86 FL
PLATELET # BLD AUTO: 382 K/UL
PMV BLD AUTO: 9.2 FL
POTASSIUM SERPL-SCNC: 4.2 MMOL/L
RBC # BLD AUTO: 4.1 M/UL
SODIUM SERPL-SCNC: 132 MMOL/L
VANCOMYCIN TROUGH SERPL-MCNC: 9.9 UG/ML
WBC # BLD AUTO: 8.39 K/UL

## 2018-01-15 PROCEDURE — 36415 COLL VENOUS BLD VENIPUNCTURE: CPT

## 2018-01-15 PROCEDURE — 80202 ASSAY OF VANCOMYCIN: CPT

## 2018-01-15 PROCEDURE — 97116 GAIT TRAINING THERAPY: CPT

## 2018-01-15 PROCEDURE — 97530 THERAPEUTIC ACTIVITIES: CPT

## 2018-01-15 PROCEDURE — A4216 STERILE WATER/SALINE, 10 ML: HCPCS | Performed by: HOSPITALIST

## 2018-01-15 PROCEDURE — 63600175 PHARM REV CODE 636 W HCPCS: Performed by: HOSPITALIST

## 2018-01-15 PROCEDURE — 93312 ECHO TRANSESOPHAGEAL: CPT | Mod: 26,,, | Performed by: INTERNAL MEDICINE

## 2018-01-15 PROCEDURE — 97535 SELF CARE MNGMENT TRAINING: CPT

## 2018-01-15 PROCEDURE — 11000001 HC ACUTE MED/SURG PRIVATE ROOM

## 2018-01-15 PROCEDURE — 93320 DOPPLER ECHO COMPLETE: CPT | Mod: 26,,, | Performed by: INTERNAL MEDICINE

## 2018-01-15 PROCEDURE — 25000003 PHARM REV CODE 250: Performed by: HOSPITALIST

## 2018-01-15 PROCEDURE — 80048 BASIC METABOLIC PNL TOTAL CA: CPT

## 2018-01-15 PROCEDURE — 25000003 PHARM REV CODE 250: Performed by: NURSE ANESTHETIST, CERTIFIED REGISTERED

## 2018-01-15 PROCEDURE — 63600175 PHARM REV CODE 636 W HCPCS: Performed by: NURSE ANESTHETIST, CERTIFIED REGISTERED

## 2018-01-15 PROCEDURE — 37000009 HC ANESTHESIA EA ADD 15 MINS: Performed by: INTERNAL MEDICINE

## 2018-01-15 PROCEDURE — 85027 COMPLETE CBC AUTOMATED: CPT

## 2018-01-15 PROCEDURE — 93325 DOPPLER ECHO COLOR FLOW MAPG: CPT | Mod: 26,,, | Performed by: INTERNAL MEDICINE

## 2018-01-15 PROCEDURE — 37000008 HC ANESTHESIA 1ST 15 MINUTES: Performed by: INTERNAL MEDICINE

## 2018-01-15 PROCEDURE — 93325 DOPPLER ECHO COLOR FLOW MAPG: CPT

## 2018-01-15 RX ORDER — MIDAZOLAM HYDROCHLORIDE 1 MG/ML
INJECTION, SOLUTION INTRAMUSCULAR; INTRAVENOUS
Status: DISCONTINUED | OUTPATIENT
Start: 2018-01-15 | End: 2018-01-15

## 2018-01-15 RX ORDER — LIDOCAINE HCL/PF 100 MG/5ML
SYRINGE (ML) INTRAVENOUS
Status: DISCONTINUED | OUTPATIENT
Start: 2018-01-15 | End: 2018-01-15

## 2018-01-15 RX ORDER — SODIUM CHLORIDE 9 MG/ML
INJECTION, SOLUTION INTRAVENOUS CONTINUOUS PRN
Status: DISCONTINUED | OUTPATIENT
Start: 2018-01-15 | End: 2018-01-15

## 2018-01-15 RX ORDER — PROPOFOL 10 MG/ML
VIAL (ML) INTRAVENOUS
Status: DISCONTINUED | OUTPATIENT
Start: 2018-01-15 | End: 2018-01-15

## 2018-01-15 RX ADMIN — ACETAMINOPHEN 650 MG: 325 TABLET ORAL at 09:01

## 2018-01-15 RX ADMIN — THERA TABS 1 TABLET: TAB at 09:01

## 2018-01-15 RX ADMIN — PROPOFOL 80 MG: 10 INJECTION, EMULSION INTRAVENOUS at 03:01

## 2018-01-15 RX ADMIN — VANCOMYCIN HYDROCHLORIDE 1000 MG: 1 INJECTION, POWDER, LYOPHILIZED, FOR SOLUTION INTRAVENOUS at 09:01

## 2018-01-15 RX ADMIN — ASPIRIN 81 MG: 81 TABLET, COATED ORAL at 09:01

## 2018-01-15 RX ADMIN — SODIUM CHLORIDE, PRESERVATIVE FREE 3 ML: 5 INJECTION INTRAVENOUS at 09:01

## 2018-01-15 RX ADMIN — MIDAZOLAM 2 MG: 1 INJECTION INTRAMUSCULAR; INTRAVENOUS at 02:01

## 2018-01-15 RX ADMIN — PROPOFOL 30 MG: 10 INJECTION, EMULSION INTRAVENOUS at 03:01

## 2018-01-15 RX ADMIN — METOPROLOL SUCCINATE 50 MG: 50 TABLET, EXTENDED RELEASE ORAL at 09:01

## 2018-01-15 RX ADMIN — LIDOCAINE HYDROCHLORIDE 75 MG: 20 INJECTION, SOLUTION INTRAVENOUS at 03:01

## 2018-01-15 RX ADMIN — ENOXAPARIN SODIUM 40 MG: 100 INJECTION SUBCUTANEOUS at 05:01

## 2018-01-15 RX ADMIN — SODIUM CHLORIDE TAB 1 GM 1 G: 1 TAB at 08:01

## 2018-01-15 RX ADMIN — VANCOMYCIN HYDROCHLORIDE 1000 MG: 1 INJECTION, POWDER, LYOPHILIZED, FOR SOLUTION INTRAVENOUS at 06:01

## 2018-01-15 RX ADMIN — SODIUM CHLORIDE TAB 1 GM 1 G: 1 TAB at 12:01

## 2018-01-15 RX ADMIN — VANCOMYCIN HYDROCHLORIDE 1000 MG: 1 INJECTION, POWDER, LYOPHILIZED, FOR SOLUTION INTRAVENOUS at 12:01

## 2018-01-15 RX ADMIN — PROPOFOL 20 MG: 10 INJECTION, EMULSION INTRAVENOUS at 03:01

## 2018-01-15 RX ADMIN — SODIUM CHLORIDE: 0.9 INJECTION, SOLUTION INTRAVENOUS at 01:01

## 2018-01-15 RX ADMIN — SODIUM CHLORIDE, PRESERVATIVE FREE 3 ML: 5 INJECTION INTRAVENOUS at 05:01

## 2018-01-15 NOTE — ASSESSMENT & PLAN NOTE
- Patient with fever on 1/10. Likely nidus is L hand.   - 1/10 Blood Cx x2: MSSA   - 1/11 Blood Cx x1: Staph (sensativity pending)   -1/12 Blood Cx x2: NGTD    1/5 TTE: No vegetations.  1/12 TTE: No vegetations. Consider MARIYA given repeat positive blood cx.    - Due to new onset MSSA bacteremia with repeat cx's positive, recommend MARIYA.  -OK on vanc since patient has a pen allergy

## 2018-01-15 NOTE — CONSULTS
[facility]  Cardiology  Consult Note    Patient Name: Spenser Rabago  MRN: [id number]  Admission Date: 1/5/2018  Hospital Length of Stay: 10 days  Code Status: Full Code   Attending Provider: Miracle Henderson MD   Consulting Provider: IZABEL Connor, ANP  Primary Care Physician: Vladimir Apple MD  Principal Problem:Non-traumatic rhabdomyolysis    Patient information was obtained from patient and past medical records.     Inpatient consult to [facility]  Consult performed by: SYED BENTON  Consult ordered by: MIRACLE HENDERSON  Reason for consult: fever         Subjective:     Chief Complaint:  Fever      HPI:   58 yo male with history of HTN, migraine headaches, attention deficit hyperactivity disorder, and depression who presented to the ER following a fall. He presented on 1/5/2018 and was admitted to [facility] since that time. He reported decreased appetite and po intake prior to admisison and was admitted with dehydration and rhabdomyolysis. His rhabdo improved with IVF and underwent Neuro evaluation with MRI with concern for CO exposure/toxicity. CO exposure/toxicity was ruled out after reports of black mold noted in his vents at home per family. He was in the process of being discharged to inpatient rehab when he developed a fever. Urinalysis, CXR and flu swab were negative with notation of elevated WBCs with swelling noted to his left hand prompting initiation of Doxycycline. His blood cultures grew Staph aureus so his PIV were changed and he was placed on IV Vancomycin and ID was consulted. His elevated WBCs improved with IV abx and ID noted murmur on exam. TTE showed no evidence of vegatation however given his positive blood cultures ID recommended evaluation with MARIYA for endocarditis. Cardiology consulted for MARIYA       Hospital Course:    1/5/2018-1/14/2018 per HPI  1/15/2018 Cardiology consulted for MARIYA for evaluation of endocarditis       Past Medical  History:   Diagnosis Date    ADD (attention deficit disorder)     Depression     Headache(784.0)     Hypertension     Migraines        History reviewed. No pertinent surgical history.    Review of patient's allergies indicates:   Allergen Reactions    Penicillins Hives       No current facility-administered medications on file prior to encounter.      Current Outpatient Prescriptions on File Prior to Encounter   Medication Sig    metoprolol succinate (TOPROL-XL) 50 MG 24 hr tablet Take 1 tablet (50 mg total) by mouth once daily.     Family History     Problem Relation (Age of Onset)    Heart disease Mother, Father    Hypertension Mother, Father        Social History Main Topics    Smoking status: Never Smoker    Smokeless tobacco: Never Used    Alcohol use 0.6 oz/week     1 Shots of liquor per week    Drug use: No    Sexual activity: Not on file     Review of Systems   Constitution: Negative for chills, decreased appetite, diaphoresis, fever and weakness.   Cardiovascular: Negative for chest pain, claudication, cyanosis, dyspnea on exertion, irregular heartbeat, leg swelling, near-syncope, orthopnea, palpitations, paroxysmal nocturnal dyspnea and syncope.   Respiratory: Negative for cough, hemoptysis, shortness of breath and wheezing.    Gastrointestinal: Negative for bloating, abdominal pain, constipation, diarrhea, melena, nausea and vomiting.   Neurological: Negative for dizziness.     Objective:     Vital Signs (Most Recent):  Temp: 98.1 °F (36.7 °C) (01/15/18 0851)  Pulse: 73 (01/15/18 0851)  Resp: 16 (01/15/18 0851)  BP: 127/77 (01/15/18 0851)  SpO2: 97 % (01/14/18 0730) Vital Signs (24h Range):  Temp:  [98 °F (36.7 °C)-100 °F (37.8 °C)] 98.1 °F (36.7 °C)  Pulse:  [71-83] 73  Resp:  [16-19] 16  BP: (113-131)/(72-84) 127/77     Weight: 74.9 kg (165 lb 2 oz)  Body mass index is 28.34 kg/m².    SpO2: 97 %  O2 Device (Oxygen Therapy): room air      Intake/Output Summary (Last 24 hours) at 01/15/18  1326  Last data filed at 01/15/18 0535   Gross per 24 hour   Intake              978 ml   Output             1550 ml   Net             -572 ml       Lines/Drains/Airways     Peripheral Intravenous Line                 Peripheral IV - Single Lumen 01/14/18 1704 Right Forearm less than 1 day                Physical Exam   Constitutional: He is oriented to person, place, and time. He appears well-developed and well-nourished. No distress.   Cardiovascular: Normal rate and regular rhythm.  Exam reveals no gallop.    No murmur heard.  Pulmonary/Chest: Effort normal and breath sounds normal. No respiratory distress. He has no wheezes.   Abdominal: Soft. Bowel sounds are normal. He exhibits no distension. There is no tenderness.   Neurological: He is alert and oriented to person, place, and time.   Skin: Skin is warm and dry.       Significant Labs:       Recent Labs  Lab 01/15/18  0426   CALCIUM 9.1   *   K 4.2   CO2 24      BUN 18   CREATININE 0.7       Recent Labs  Lab 01/15/18  0426   WBC 8.39   RBC 4.10*   HGB 12.0*   HCT 35.1*   *   MCV 86   MCH 29.3   MCHC 34.2       Significant Imaging: Echocardiogram:   2D echo with color flow doppler:   Results for orders placed or performed during the hospital encounter of 01/05/18   2D echo with color flow doppler   Result Value Ref Range    EF 65 55 - 65    Diastolic Dysfunction Yes (A)     Pericardial Effusion NONE     Mitral Valve Mobility NORMAL      Assessment and Plan:     Bacteremia due to Staphylococcus aureus    -fever with positive blood cultures  -changed from Doxy to IV Vancomycin  -ID on board with recs for MARIYA  -NPO today for MARIYA for evaluation of endocarditis             VTE Risk Mitigation         Ordered     enoxaparin injection 40 mg  Daily     Route:  Subcutaneous        01/05/18 1000     High Risk of VTE  Once      01/05/18 1000     Reason for No Pharmacological VTE Prophylaxis  Once      01/05/18 1000     Place sequential compression  device  Until discontinued      01/05/18 1000          Thank you for your consult.     IZABEL Connor, ANP  Cardiology   [facility]

## 2018-01-15 NOTE — PT/OT/SLP PROGRESS
Physical Therapy Treatment    Patient Name:  Spenser Rabago   MRN:  [id number]    Recommendations:     Discharge Recommendations:  rehabilitation facility   Discharge Equipment Recommendations: none   Barriers to discharge: Decreased caregiver support    Assessment:     Spenser Rabago is a 57 y.o. male admitted with a medical diagnosis of Non-traumatic rhabdomyolysis.  He presents with the following impairments/functional limitations:  weakness, gait instability, impaired balance, impaired endurance, impaired functional mobilty, impaired self care skills, pain, decreased lower extremity function Patient improved functional mobility limited endurance and safety awareness when using AD.    Rehab Prognosis:  good; patient would benefit from acute skilled PT services to address these deficits and reach maximum level of function.      Recent Surgery: Procedure(s) (LRB):  MARIYA During Cath Case (N/A) Day of Surgery    Plan:     During this hospitalization, patient to be seen 6 x/week to address the above listed problems via gait training, therapeutic activities, therapeutic exercises  · Plan of Care Expires:  02/04/18   Plan of Care Reviewed with: patient    Subjective     Communicated with primary nurse prior to session.  Patient found supine in bed upon PT entry to room, agreeable to treatment.      Chief Complaint: weakness and fatigue   Patient comments/goals: get better  Pain/Comfort:  · Pain Rating 1: 0/10  · Pain Rating Post-Intervention 1: 0/10    Patients cultural, spiritual, Sabianism conflicts given the current situation: None verbalized to PTA    Objective:     Patient found with: bed alarm     General Precautions: Standard, fall   Orthopedic Precautions:N/A   Braces: N/A     Functional Mobility:  · Bed Mobility:     · Supine to Sit: stand by assistance  · Sit to Supine: stand by assistance  · Transfers:     · Sit to Stand:  contact guard assistance with rolling walker  · Gait: Min assistance and verbal  cues for proper RW usage  · Balance: Fair with RW      AM-PAC 6 CLICK MOBILITY  Turning over in bed (including adjusting bedclothes, sheets and blankets)?: 4  Sitting down on and standing up from a chair with arms (e.g., wheelchair, bedside commode, etc.): 4  Moving from lying on back to sitting on the side of the bed?: 4  Moving to and from a bed to a chair (including a wheelchair)?: 3  Need to walk in hospital room?: 3  Climbing 3-5 steps with a railing?: 3  Total Score: 21       Therapeutic Activities and Exercises:   Assisted with self stretching for R ankle    Patient left HOB elevated with all lines intact, call button in reach and bed alarm on..    GOALS:    Physical Therapy Goals        Problem: Physical Therapy Goal    Goal Priority Disciplines Outcome Goal Variances Interventions   Physical Therapy Goal     PT/OT, PT Ongoing (interventions implemented as appropriate)     Description:  Goals to be met by: 18     Patient will increase functional independence with mobility by performin. Supine to sit with Stand-by Assistance  2. Sit to supine with Stand-by Assistance MET 2018  3. Sit to stand transfer with Moderate Assistance MET 2018  4. Bed to chair transfer with Moderate Assistance using least restirctive AD MET 2018  5.  Assess gait     Updated Goals:  1. Supine<>sit with SBA  2. Sit to stand to SBA with RW  3. Bed to chair transfers with RW and CGA  4. Gait x 20 ft with RW and CGA                       Time Tracking:     PT Received On: 01/15/18  PT Start Time: 1110     PT Stop Time: 1130  PT Total Time (min): 20 min     Billable Minutes: Gait Training 18    Treatment Type: Treatment  PT/PTA: PT     PTA Visit Number: 3     Luther Westbrook, PT  01/15/2018

## 2018-01-15 NOTE — PLAN OF CARE
Problem: Occupational Therapy Goal  Goal: Occupational Therapy Goal  Goals to be met by: 2/5     Patient will increase functional independence with ADLs by performing:    UE Dressing with Set-up Assistance.  Grooming while standing at sink with Moderate Assistance.  Toileting from toilet with Moderate Assistance for hygiene and clothing management.   Supine to sit with Stand-by Assistance. --MET 1/15/18  Toilet transfer to toilet with Moderate Assistance.  Increased functional strength to 4/5 for BUE.     Outcome: Ongoing (interventions implemented as appropriate)  Patient with much improved affect and activity participation this date.  L hand much less edematous than previous visits. Still with c/o decreased sensation in fingertips, more so on L hand. Patient remains appropriate for IPR to address functional deficits and improve performance in ADL tasks.

## 2018-01-15 NOTE — ASSESSMENT & PLAN NOTE
- Patient with fever on 1/10. Likely nidus is L hand.   - 1/10 Blood Cx x2: MSSA   - 1/11 Blood Cx x1: Staph   -1/12 Blood Cx x2: NGTD    1/5 TTE: No vegetations.  1/12 TTE: No vegetations. Consider MARIYA given repeat positive blood cx.    - Due to new onset MSSA bacteremia with repeat cx's positive, MARIYA today.  - OK on Vancomycin for now since patient has a pen allergy. Duration pending MARIYA results

## 2018-01-15 NOTE — PROGRESS NOTES
"[facility]  Infectious Disease  Progress Note    Patient Name: Spenser Rabago  MRN: [id number]  Admission Date: 1/5/2018  Length of Stay: 9 days  Attending Physician: Carlos Henderson MD  Primary Care Provider: Vladimir Apple MD    Isolation Status: No active isolations  Assessment/Plan:      Bacteremia due to Staphylococcus aureus    - Patient with fever on 1/10. Likely nidus is L hand.   - 1/10 Blood Cx x2: MSSA   - 1/11 Blood Cx x1: Staph (sensativity pending)   -1/12 Blood Cx x2: NGTD    1/5 TTE: No vegetations.  1/12 TTE: No vegetations. Consider MARIYA given repeat positive blood cx.    - Due to new onset MSSA bacteremia with repeat cx's positive, recommend MARIYA.  -OK on vanc since patient has a pen allergy               Anticipated Disposition:     Thank you for your consult. I will follow-up with patient. Please contact us if you have any additional questions.    Dago Stein MD  Infectious Disease  [facility]    Subjective:     Principal Problem:Non-traumatic rhabdomyolysis    HPI:  57 y.o. male who  has a past medical history of ADD (attention deficit disorder); Depression; Headache(784.0); Hypertension; and Migraines presents with Fall x1 day    Patient presented to ED after a fall and was found to be in rhabdomyolysis. MRI brain was performed in which he was found to have brain injuries consistent with CO poisoning. On 1/10, as patient was being prepared to be discharged to IP rehab, he spiked a fever. Blood cx's were drawn and were positive for Staph.    On exam, patient has an area of 4cm x 3cm area of cellulitis that is dark black in odor, no discharge/drainage, with malodor. He states that this wound first became apparent at admission on 1/5. Patient admits to feeling tired and  "down," denies fever, chills, chest pain, abdominal pain.    Review of Systems   Constitutional: Negative for chills and fever.   HENT: Negative for hearing loss and tinnitus.  "   Respiratory: Negative for cough and shortness of breath.    Cardiovascular: Negative for chest pain and palpitations.   Gastrointestinal: Negative for abdominal pain, diarrhea, nausea and vomiting.   Genitourinary: Negative for dysuria and hematuria.   Musculoskeletal: Negative for arthralgias and myalgias.   Neurological: Negative for syncope and headaches.   Psychiatric/Behavioral: Negative for agitation and confusion.     Objective:     Vital Signs (Most Recent):  Temp: 99 °F (37.2 °C) (01/13/18 0717)  Pulse: 69 (01/13/18 0717)  Resp: 19 (01/13/18 0717)  BP: 116/78 (01/13/18 0717)  SpO2: 98 % (01/13/18 0730) Vital Signs (24h Range):  Temp:  [98.5 °F (36.9 °C)-99.2 °F (37.3 °C)] 99 °F (37.2 °C)  Pulse:  [69-84] 69  Resp:  [16-19] 19  SpO2:  [97 %-98 %] 98 %  BP: (116-133)/(72-87) 116/78     Weight: 74.9 kg (165 lb 2 oz)  Body mass index is 28.34 kg/m².    Estimated Creatinine Clearance: 106.6 mL/min (based on SCr of 0.7 mg/dL).    Physical Exam   Constitutional: He is oriented to person, place, and time. He appears well-developed and well-nourished.   HENT:   Head: Normocephalic and atraumatic.   Eyes: Conjunctivae and EOM are normal. Pupils are equal, round, and reactive to light.   Neck: Normal range of motion. Neck supple.   Cardiovascular: Normal rate and regular rhythm.    Pulmonary/Chest: Effort normal and breath sounds normal.   Abdominal: Soft. Bowel sounds are normal.   Musculoskeletal: Normal range of motion.   Black eschar on L palm, 4cm x 3cm   Neurological: He is alert and oriented to person, place, and time.   Skin: Skin is dry.   Psychiatric: He has a normal mood and affect. His behavior is normal.     Interval History: Feel great    Review of Systems  Objective:     Vital Signs (Most Recent):  Temp: 100 °F (37.8 °C) (01/14/18 1855)  Pulse: 83 (01/14/18 1855)  Resp: 19 (01/14/18 1855)  BP: 116/72 (01/14/18 1855)  SpO2: 97 % (01/14/18 0730) Vital Signs (24h Range):  Temp:  [98 °F (36.7 °C)-100 °F  (37.8 °C)] 100 °F (37.8 °C)  Pulse:  [67-83] 83  Resp:  [16-19] 19  SpO2:  [97 %] 97 %  BP: (113-128)/(72-79) 116/72     Weight: 74.9 kg (165 lb 2 oz)  Body mass index is 28.34 kg/m².    Estimated Creatinine Clearance: 67.8 mL/min (based on SCr of 1.1 mg/dL).    Physical Exam    Significant Labs: All pertinent labs within the past 24 hours have been reviewed.    Significant Imaging: I have reviewed all pertinent imaging results/findings within the past 24 hours.

## 2018-01-15 NOTE — HOSPITAL COURSE
1/5/2018-1/14/2018 per HPI  1/15/2018 Cardiology consulted for MARIYA for evaluation of endocarditis

## 2018-01-15 NOTE — ASSESSMENT & PLAN NOTE
-fever with positive blood cultures  -changed from Doxy to IV Vancomycin  -ID on board with recs for MARIYA  -NPO today for MARIYA for evaluation of endocarditis

## 2018-01-15 NOTE — ANESTHESIA PREPROCEDURE EVALUATION
01/15/2018  Spenser Rabago is a 57 y.o., male with staph aureus bacteremia scheduled for MARIYA. Patient ate breakfast at 8am, denies anything to eat or drink after 8am.    Past Medical History:   Diagnosis Date    ADD (attention deficit disorder)     Depression     Headache(784.0)     Hypertension     Migraines      History reviewed. No pertinent surgical history.    Recent Labs  Lab 01/15/18  0426   WBC 8.39   RBC 4.10*   HGB 12.0*   HCT 35.1*   *   MCV 86   MCH 29.3   MCHC 34.2     CMP  Sodium   Date Value Ref Range Status   01/15/2018 132 (L) 136 - 145 mmol/L Final     Potassium   Date Value Ref Range Status   01/15/2018 4.2 3.5 - 5.1 mmol/L Final     Chloride   Date Value Ref Range Status   01/15/2018 100 95 - 110 mmol/L Final     CO2   Date Value Ref Range Status   01/15/2018 24 23 - 29 mmol/L Final     Glucose   Date Value Ref Range Status   01/15/2018 106 70 - 110 mg/dL Final     BUN ([place])   Date Value Ref Range Status   12/30/2014 14 2 - 20 mg/dL Final     BUN, Bld   Date Value Ref Range Status   01/15/2018 18 6 - 20 mg/dL Final     Creatinine   Date Value Ref Range Status   01/15/2018 0.7 0.5 - 1.4 mg/dL Final     Calcium   Date Value Ref Range Status   01/15/2018 9.1 8.7 - 10.5 mg/dL Final     Total Protein   Date Value Ref Range Status   01/06/2018 5.3 (L) 6.0 - 8.4 g/dL Final     Albumin   Date Value Ref Range Status   01/06/2018 2.2 (L) 3.5 - 5.2 g/dL Final     Total Bilirubin   Date Value Ref Range Status   01/06/2018 0.7 0.1 - 1.0 mg/dL Final     Comment:     For infants and newborns, interpretation of results should be based  on gestational age, weight and in agreement with clinical  observations.  Premature Infant recommended reference ranges:  Up to 24 hours.............<8.0 mg/dL  Up to 48 hours............<12.0 mg/dL  3-5 days..................<15.0 mg/dL  6-29  days.................<15.0 mg/dL       Alkaline Phosphatase ([facility])   Date Value Ref Range Status   12/30/2014 64 38 - 126 IU/L Final     Alkaline Phosphatase   Date Value Ref Range Status   01/06/2018 52 (L) 55 - 135 U/L Final     AST ([facility])   Date Value Ref Range Status   04/03/2016 24 15 - 46 IU/L Final     AST   Date Value Ref Range Status   01/06/2018 40 10 - 40 U/L Final     ALT   Date Value Ref Range Status   01/06/2018 50 (H) 10 - 44 U/L Final     Anion Gap   Date Value Ref Range Status   01/15/2018 8 8 - 16 mmol/L Final     eGFR if    Date Value Ref Range Status   01/15/2018 >60 >60 mL/min/1.73 m^2 Final     eGFR if non    Date Value Ref Range Status   01/15/2018 >60 >60 mL/min/1.73 m^2 Final     Comment:     Calculation used to obtain the estimated glomerular filtration  rate (eGFR) is the CKD-EPI equation.        Review of patient's allergies indicates:   Allergen Reactions    Penicillins Hives     Vitals:    01/14/18 1930 01/15/18 0126 01/15/18 0535 01/15/18 0851   BP: 116/72 121/84 131/80 127/77   BP Location:  Left arm Left arm    Patient Position: Lying Lying Lying    Pulse: 83 74 71 73   Resp: 19  19 16   Temp: 37.8 °C (100 °F) 37.1 °C (98.7 °F) 37.4 °C (99.3 °F) 36.7 °C (98.1 °F)   TempSrc: Oral Oral Oral    SpO2:       Weight:       Height:         Echo: 1/12/18  CONCLUSIONS     1 - Normal left ventricular systolic function (EF 60-65%).     2 - No wall motion abnormalities.     3 - Concentric remodeling.     4 - Normal right ventricular systolic function     EKG 1/5/18  Normal sinus rhythm  Septal infarct ,age undetermined  Abnormal ECG  When compared with ECG of 03-APR-2016 21:54,  Aberrant conduction is no longer Present  Septal infarct is now Present    Anesthesia Evaluation    I have reviewed the Patient Summary Reports.    I have reviewed the Nursing Notes.   I have reviewed the Medications.     Review of Systems  Anesthesia Hx:  No problems  with previous Anesthesia Denies Hx of Anesthetic complications    Social:  Non-Smoker, No Alcohol Use    Hematology/Oncology:         -- Anemia:   EENT/Dental:EENT/Dental Normal   Cardiovascular:   Exercise tolerance: good Hypertension ECG has been reviewed.    Pulmonary:  Pulmonary Normal    Renal/:  Renal/ Normal     Hepatic/GI:  Hepatic/GI Normal    Musculoskeletal:  Musculoskeletal Normal    Neurological:   Headaches    Endocrine:  Endocrine Normal    Psych:   Psychiatric History          Physical Exam  General:  Well nourished    Airway/Jaw/Neck:  Airway Findings: Mouth Opening: Normal Tongue: Normal  General Airway Assessment: Adult  Mallampati: II  TM Distance: Normal, at least 6 cm  Jaw/Neck Findings:  Neck ROM: Normal ROM      Dental:  Dental Findings: In tactDenies missing, loose, or chipped teeth   Chest/Lungs:  Chest/Lungs Findings: Clear to auscultation     Heart/Vascular:  Heart Findings: Rate: Normal  Rhythm: Regular Rhythm        Mental Status:  Mental Status Findings:  Alert and Oriented, Cooperative         Anesthesia Plan  Type of Anesthesia, risks & benefits discussed:  Anesthesia Type:  MAC, general  Patient's Preference:   Intra-op Monitoring Plan: standard ASA monitors  Intra-op Monitoring Plan Comments:   Post Op Pain Control Plan:   Post Op Pain Control Plan Comments:   Induction:   IV  Beta Blocker:  Patient is on a Beta-Blocker and has received one dose within the past 24 hours (No further documentation required).       Informed Consent: Patient understands risks and agrees with Anesthesia plan.  Questions answered. Anesthesia consent signed with patient.  ASA Score: 2     Day of Surgery Review of History & Physical:    H&P update referred to the surgeon.     Anesthesia Plan Notes: Patient ate breakfast at 8am. Lunch tray in room, denies eating lunch.        Ready For Surgery From Anesthesia Perspective.

## 2018-01-15 NOTE — ANESTHESIA POSTPROCEDURE EVALUATION
"Anesthesia Post Evaluation    Patient: Spenser Rabago    Procedure(s) Performed: Procedure(s) (LRB):  MARIYA During Cath Case (N/A)    Final Anesthesia Type: MAC  Patient location during evaluation: PACU  Patient participation: Yes- Able to Participate  Level of consciousness: awake and alert, oriented and awake  Post-procedure vital signs: reviewed and stable  Pain management: adequate  Airway patency: patent  PONV status at discharge: No PONV  Anesthetic complications: no      Cardiovascular status: blood pressure returned to baseline  Respiratory status: unassisted and room air  Hydration status: euvolemic  Follow-up not needed.        Visit Vitals  /82 (BP Location: Left arm, Patient Position: Sitting)   Pulse 62   Temp 36.8 °C (98.2 °F)   Resp 18   Ht 5' 4" (1.626 m)   Wt 74.9 kg (165 lb 2 oz)   SpO2 100%   BMI 28.34 kg/m²       Pain/Franki Score: Pain Assessment Performed: Yes (1/15/2018  3:54 PM)  Presence of Pain: denies (1/15/2018  3:54 PM)  Pain Rating Prior to Med Admin: 8 (1/15/2018  9:23 AM)      "

## 2018-01-15 NOTE — ASSESSMENT & PLAN NOTE
Probably caused by abrasions from his fall.  Started vancomycin.  Appreciate ID and Cardiology.  MARIYA done.

## 2018-01-15 NOTE — PROGRESS NOTES
The Sw spoke to Ravin at [facility] and faxed her the updated notes via [facility]. She requires a stop date on the iv abx and if the pt's coming on iv abx. The Sw informed her she will find out and call her back b/c she states she needs to know b/c that will determine if they will be able to accept the pt.

## 2018-01-15 NOTE — SUBJECTIVE & OBJECTIVE
Interval History: Got MARIYA, result pending.    Review of Systems   Respiratory: Negative for shortness of breath.    Cardiovascular: Negative for chest pain and palpitations.   Gastrointestinal: Negative for nausea and vomiting.     Objective:     Vital Signs (Most Recent):  Temp: 98.2 °F (36.8 °C) (01/15/18 1630)  Pulse: 62 (01/15/18 1630)  Resp: 18 (01/15/18 1630)  BP: 130/82 (01/15/18 1630)  SpO2: 100 % (01/15/18 1630) Vital Signs (24h Range):  Temp:  [98.1 °F (36.7 °C)-100 °F (37.8 °C)] 98.2 °F (36.8 °C)  Pulse:  [54-83] 62  Resp:  [16-19] 18  SpO2:  [100 %] 100 %  BP: (103-131)/(61-84) 130/82     Weight: 74.9 kg (165 lb 2 oz)  Body mass index is 28.34 kg/m².    Intake/Output Summary (Last 24 hours) at 01/15/18 1723  Last data filed at 01/15/18 1527   Gross per 24 hour   Intake              863 ml   Output             1000 ml   Net             -137 ml      Physical Exam   Constitutional: He is oriented to person, place, and time. He appears well-developed and well-nourished. No distress.   Pulmonary/Chest: Effort normal. No respiratory distress.   Neurological: He is alert and oriented to person, place, and time.   Skin: Skin is warm and dry.   Psychiatric: He has a normal mood and affect.   Nursing note and vitals reviewed.      Significant Labs:   CBC:     Recent Labs  Lab 01/14/18  0602 01/15/18  0426   WBC 6.60 8.39   HGB 11.4* 12.0*   HCT 34.1* 35.1*    382*     CMP:     Recent Labs  Lab 01/14/18  0602 01/15/18  0426   * 132*   K 4.0 4.2    100   CO2 23 24    106   BUN 15 18   CREATININE 1.1 0.7   CALCIUM 9.0 9.1   ANIONGAP 9 8   EGFRNONAA >60 >60     Magnesium:   No results for input(s): MG in the last 48 hours.    Significant Imaging: I have reviewed all pertinent imaging results/findings within the past 24 hours.

## 2018-01-15 NOTE — TRANSFER OF CARE
"Anesthesia Transfer of Care Note    Patient: Spenser Rabago    Procedure(s) Performed: Procedure(s) (LRB):  MARIYA During Cath Case (N/A)    Patient location: PACU    Anesthesia Type: MAC    Transport from OR: Transported from OR on 2-3 L/min O2 by NC with adequate spontaneous ventilation    Post pain: adequate analgesia    Post assessment: no apparent anesthetic complications    Post vital signs: stable    Level of consciousness: sedated    Nausea/Vomiting: no nausea/vomiting    Complications: none    Transfer of care protocol was followed      Last vitals:   Visit Vitals  /61 (BP Location: Right arm, Patient Position: Lying)   Pulse 63   Temp 36.7 °C (98.1 °F)   Resp 18   Ht 5' 4" (1.626 m)   Wt 74.9 kg (165 lb 2 oz)   SpO2 97%   BMI 28.34 kg/m²     "

## 2018-01-15 NOTE — PROGRESS NOTES
[facility] Hospital Medicine  Progress Note    Patient Name: Spenser Rabago  MRN: [id number]  Patient Class: IP- Inpatient   Admission Date: 1/5/2018  Length of Stay: 10 days  Attending Physician: Carlos Henderson MD  Primary Care Provider: Vladimir Apple MD        Subjective:     Principal Problem:Non-traumatic rhabdomyolysis    HPI:  Spenser Rabago is a 57 y.o. white man with hypertension, migraine headaches, attention deficit hyperactivity disorder, and depression.  He lives in [place], [place].  His primary care physician is Dr. Vladimir Apple.  His neurologist is Dr. Frank Lancaster.   He was taken by EMS to [facility] on 1/5/18 after falling and being on the floor for at least 6 hours.  Four days prior to this, a box fell on his right foot and he had pain and difficulty walking.  He was falling a lot after that.  He also had decreased appetite and oral intake the past week.  He was found to have dehydration (sodium 127, BUN 47, urine with ketones and hyaline casts), high anion gap acidosis (bicarbonate 18, anion gap 20), and rhabdomyolysis (CPK 1759).  He was admitted to [facility].    Hospital Course:  Rhabdomyolysis resolved with IV fluids.  He was found to have right greater than left lower extremity weakness.  MRI showed bilateral hyperintensity in the globus pallidi and throughout the supratentorial white matter.  Neurology was concerned for carbon monoxide exposure/toxicity.  His carboxyhemoglobin was within normal limits.  Neurology recommended hyperbaric oxygen therapy, but director of hyperbarics Dr. Alaina Kuo said that was only indicated in acute CO poisoning.  His family went to his house and found black mold in the vents.  Inpatient rehab was sought and he waited around for a few days.  Lisinopril was stopped because his blood pressures were controlled on metoprolol alone.  Alprazolam was stopped because he no longer had anxiety  likely due to the carbon monoxide brain damage.              He was set up for discharge to inpatient rehab, but on 1/10/18, he suddenly developed new fever.  Urinalysis showed no evidence for UTI.  Chest x-ray showed no pneumonia.  Influenza swab was negative.  CBC showed new leukocytosis.  Procalcitonin was elevated.  He denied any specific symptoms except for mild cough.  His left hand distal to a peripheral IV was swollen, with mild redness and tenderness at the IV site.  Doxycycline was started for suspected cellulitis.  Peripheral IVs were removed.  The blood cultures grew methicillin-sensitive Staphylococcus aureus so a new peripheral IV was placed, doxycycline was changed to IV vancomycin, and Infectious Disease was consulted.  Leukocytosis resolved after initiating antibiotics.  Infectious Disease noted the hand cellulitis, but Mr. Rabago told them that it had been there since admission.  They recommended repeating the echocardiogram, after hearing a systolic murmur.  Transthoracic echocardiogram showed no evidence of vegetation.  Tissue ultrasound of the left hand was unremarkable.  Infectious Disease recommended transesophageal echocardiogram to evaluate for endocarditis.      Interval History: Got MARIYA, result pending.    Review of Systems   Respiratory: Negative for shortness of breath.    Cardiovascular: Negative for chest pain and palpitations.   Gastrointestinal: Negative for nausea and vomiting.     Objective:     Vital Signs (Most Recent):  Temp: 98.2 °F (36.8 °C) (01/15/18 1630)  Pulse: 62 (01/15/18 1630)  Resp: 18 (01/15/18 1630)  BP: 130/82 (01/15/18 1630)  SpO2: 100 % (01/15/18 1630) Vital Signs (24h Range):  Temp:  [98.1 °F (36.7 °C)-100 °F (37.8 °C)] 98.2 °F (36.8 °C)  Pulse:  [54-83] 62  Resp:  [16-19] 18  SpO2:  [100 %] 100 %  BP: (103-131)/(61-84) 130/82     Weight: 74.9 kg (165 lb 2 oz)  Body mass index is 28.34 kg/m².    Intake/Output Summary (Last 24 hours) at 01/15/18 4469  Last data  filed at 01/15/18 1527   Gross per 24 hour   Intake              863 ml   Output             1000 ml   Net             -137 ml      Physical Exam   Constitutional: He is oriented to person, place, and time. He appears well-developed and well-nourished. No distress.   Pulmonary/Chest: Effort normal. No respiratory distress.   Neurological: He is alert and oriented to person, place, and time.   Skin: Skin is warm and dry.   Psychiatric: He has a normal mood and affect.   Nursing note and vitals reviewed.      Significant Labs:   CBC:     Recent Labs  Lab 01/14/18  0602 01/15/18  0426   WBC 6.60 8.39   HGB 11.4* 12.0*   HCT 34.1* 35.1*    382*     CMP:     Recent Labs  Lab 01/14/18  0602 01/15/18  0426   * 132*   K 4.0 4.2    100   CO2 23 24    106   BUN 15 18   CREATININE 1.1 0.7   CALCIUM 9.0 9.1   ANIONGAP 9 8   EGFRNONAA >60 >60     Magnesium:   No results for input(s): MG in the last 48 hours.    Significant Imaging: I have reviewed all pertinent imaging results/findings within the past 24 hours.     Assessment/Plan:      Bacteremia due to Staphylococcus aureus    Probably caused by abrasions from his fall.  Started vancomycin.  Appreciate ID and Cardiology.  MARIYA done.          Cellulitis of left hand    Looks better.  On vancomycin.          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  Appreciate Neurology.  Hyperbaric oxygen therapy not indicated per Dr. Kuo.  Inpatient rehab.        Attention deficit hyperactivity disorder (ADHD)    Holding Adderall for now.         Essential hypertension    Resume home Toprol XL.  Holding lisinopril.  Monitor.         Depression    Will resume trazodone and Wellbutrin in coming days.           Mixed migraine and muscle contraction headache    Currently has alprazolam, baclofen, Percocet, Opana ER, tizanadine, and topiramate on his med list in our system.  He could not confirm medications  at this time.  His primary care physician and neurologist are [facility] so should be correct.  Not complaining of any headaches or other pain at this time.           VTE Risk Mitigation         Ordered     enoxaparin injection 40 mg  Daily     Route:  Subcutaneous        01/05/18 1000     High Risk of VTE  Once      01/05/18 1000     Reason for No Pharmacological VTE Prophylaxis  Once      01/05/18 1000     Place sequential compression device  Until discontinued      01/05/18 1000              Carlos Henderson MD  Department of Hospital Medicine   [facility]

## 2018-01-15 NOTE — PT/OT/SLP PROGRESS
"Occupational Therapy   Treatment    Name: Spenser Rabago  MRN: [id number]  Admitting Diagnosis:  Non-traumatic rhabdomyolysis       Recommendations:     Discharge Recommendations: rehabilitation facility  Discharge Equipment Recommendations:   (Defer to IPR)  Barriers to discharge:  Decreased caregiver support    Subjective   "I've got a cluster headache"  Communicated with: nurseObdulio prior to session.  Pain/Comfort:  · Pain Rating 1: 8/10 (reports cluster headache)  · Pain Addressed 1: Cessation of Activity, Nurse notified    Objective:     Patient found with: bed alarm, peripheral IV, pressure relief boots    General Precautions: Standard, fall   Orthopedic Precautions:N/A   Braces: N/A     Bed Mobility:    · Patient completed Scooting/Bridging with stand by assistance  · Patient completed Supine to Sit with stand by assistance  · Patient completed Sit to Supine with stand by assistance     Functional Mobility/Transfers:  · Patient completed Sit <> Stand Transfer with stand by assistance and contact guard assistance  with  rolling walker   · Patient completed Bed <> Chair Transfer using Step Transfer technique with contact guard assistance with rolling walker    Activities of Daily Living:  · Grooming: supervision    · LB Dressing: minimum assistance      Patient left up in chair with all lines intact, call button in reach and RN notified    [facility] 6 Click:  [facility] Total Score: 18    Treatment & Education:  Patient with headache pain and also B foot pain. Patient educated on importance of not allowing B feet to get "jammed up" on/under footrest, and being able to scoot self up higher in bed. Patient verbalized understanding. Sup > sit with SBA and use of SR. Patient with much improved coordination, timing, and initiation of tasks. Patient sat EOB for short time and then returned supine without (A) and without warning. Patient doffed socks in Fig 4 from bedlevel with HOB elevated. Able to sia R sock, but had " increased difficulty with L sock. Min (A) to place socks on toes, but patient able to pull up and adjust from there. Returned to sit EOB and stood with SBA-CGA. Ambulated to bedside chair with CGA using RW.  In chair, patient completed G/H tasks with set-up (A). BSC placed in patient's room and instructed to call for (A) when needing to use commode for BM. Verbalized understanding. RN informed of same.   Education:    Assessment:   Patient with much improved affect and activity participation this date.  L hand much less edematous than previous visits. Still with c/o decreased sensation in fingertips, more so on L hand. Patient remains appropriate for IPR to address functional deficits and improve performance in ADL tasks.     Spenser Rabago is a 57 y.o. male with a medical diagnosis of Non-traumatic rhabdomyolysis. Performance deficits affecting function are weakness, impaired endurance, impaired self care skills, impaired sensation, impaired functional mobilty, gait instability, impaired balance, impaired cognition, decreased safety awareness, decreased upper extremity function, decreased lower extremity function, decreased ROM, impaired fine motor, edema, impaired skin, pain.      Rehab Prognosis:  Good+; patient would benefit from acute skilled OT services to address these deficits and reach maximum level of function.       Plan:     Patient to be seen 5 x/week to address the above listed problems via self-care/home management, therapeutic activities, therapeutic exercises  · Plan of Care Expires: 02/05/18  · Plan of Care Reviewed with: patient    This Plan of care has been discussed with the patient who was involved in its development and understands and is in agreement with the identified goals and treatment plan    GOALS:    Occupational Therapy Goals        Problem: Occupational Therapy Goal    Goal Priority Disciplines Outcome Interventions   Occupational Therapy Goal     OT, PT/OT Ongoing (interventions  implemented as appropriate)    Description:  Goals to be met by: 2/5     Patient will increase functional independence with ADLs by performing:    UE Dressing with Set-up Assistance.  Grooming while standing at sink with Moderate Assistance.  Toileting from toilet with Moderate Assistance for hygiene and clothing management.   Supine to sit with Stand-by Assistance. --MET 1/15/18  Toilet transfer to toilet with Moderate Assistance.  Increased functional strength to 4/5 for BUE.                       Time Tracking:     OT Date of Treatment: 01/15/18  OT Start Time: 0910  OT Stop Time: 1003  OT Total Time (min): 53 min    Billable Minutes:Self Care/Home Management 23  Therapeutic Activity 30    NATALIYA Garay/TABITHA  1/15/2018

## 2018-01-15 NOTE — NURSING
Report called to nicole gonzalez and questions answered. Patient vitals stable and taken to floor by rn

## 2018-01-15 NOTE — PLAN OF CARE
Problem: Patient Care Overview  Goal: Plan of Care Review  Slept well noc. No c/o pain.bed alarm on and bed in lowest position. Call bell in reach.

## 2018-01-15 NOTE — HPI
58 yo male with history of HTN, migraine headaches, attention deficit hyperactivity disorder, and depression who presented to the ER following a fall. He presented on 1/5/2018 and was admitted to [facility] Medicine since that time. He reported decreased appetite and po intake prior to admisison and was admitted with dehydration and rhabdomyolysis. His rhabdo improved with IVF and underwent Neuro evaluation with MRI with concern for CO exposure/toxicity. CO exposure/toxicity was ruled out after reports of black mold noted in his vents at home per family. He was in the process of being discharged to inpatient rehab when he developed a fever. Urinalysis, CXR and flu swab were negative with notation of elevated WBCs with swelling noted to his left hand prompting initiation of Doxycycline. His blood cultures grew Staph aureus so his PIV were changed and he was placed on IV Vancomycin and ID was consulted. His elevated WBCs improved with IV abx and ID noted murmur on exam. TTE showed no evidence of vegatation however given his positive blood cultures ID recommended evaluation with MARIYA for endocarditis. Cardiology consulted for MARIYA

## 2018-01-15 NOTE — SUBJECTIVE & OBJECTIVE
Past Medical History:   Diagnosis Date    ADD (attention deficit disorder)     Depression     Headache(784.0)     Hypertension     Migraines        History reviewed. No pertinent surgical history.    Review of patient's allergies indicates:   Allergen Reactions    Penicillins Hives       No current facility-administered medications on file prior to encounter.      Current Outpatient Prescriptions on File Prior to Encounter   Medication Sig    metoprolol succinate (TOPROL-XL) 50 MG 24 hr tablet Take 1 tablet (50 mg total) by mouth once daily.     Family History     Problem Relation (Age of Onset)    Heart disease Mother, Father    Hypertension Mother, Father        Social History Main Topics    Smoking status: Never Smoker    Smokeless tobacco: Never Used    Alcohol use 0.6 oz/week     1 Shots of liquor per week    Drug use: No    Sexual activity: Not on file     Review of Systems   Constitution: Negative for chills, decreased appetite, diaphoresis, fever and weakness.   Cardiovascular: Negative for chest pain, claudication, cyanosis, dyspnea on exertion, irregular heartbeat, leg swelling, near-syncope, orthopnea, palpitations, paroxysmal nocturnal dyspnea and syncope.   Respiratory: Negative for cough, hemoptysis, shortness of breath and wheezing.    Gastrointestinal: Negative for bloating, abdominal pain, constipation, diarrhea, melena, nausea and vomiting.   Neurological: Negative for dizziness.     Objective:     Vital Signs (Most Recent):  Temp: 98.1 °F (36.7 °C) (01/15/18 0851)  Pulse: 73 (01/15/18 0851)  Resp: 16 (01/15/18 0851)  BP: 127/77 (01/15/18 0851)  SpO2: 97 % (01/14/18 0730) Vital Signs (24h Range):  Temp:  [98 °F (36.7 °C)-100 °F (37.8 °C)] 98.1 °F (36.7 °C)  Pulse:  [71-83] 73  Resp:  [16-19] 16  BP: (113-131)/(72-84) 127/77     Weight: 74.9 kg (165 lb 2 oz)  Body mass index is 28.34 kg/m².    SpO2: 97 %  O2 Device (Oxygen Therapy): room air      Intake/Output Summary (Last 24 hours) at  01/15/18 1326  Last data filed at 01/15/18 0535   Gross per 24 hour   Intake              978 ml   Output             1550 ml   Net             -572 ml       Lines/Drains/Airways     Peripheral Intravenous Line                 Peripheral IV - Single Lumen 01/14/18 1704 Right Forearm less than 1 day                Physical Exam   Constitutional: He is oriented to person, place, and time. He appears well-developed and well-nourished. No distress.   Cardiovascular: Normal rate and regular rhythm.  Exam reveals no gallop.    No murmur heard.  Pulmonary/Chest: Effort normal and breath sounds normal. No respiratory distress. He has no wheezes.   Abdominal: Soft. Bowel sounds are normal. He exhibits no distension. There is no tenderness.   Neurological: He is alert and oriented to person, place, and time.   Skin: Skin is warm and dry.       Significant Labs:       Recent Labs  Lab 01/15/18  0426   CALCIUM 9.1   *   K 4.2   CO2 24      BUN 18   CREATININE 0.7       Recent Labs  Lab 01/15/18  0426   WBC 8.39   RBC 4.10*   HGB 12.0*   HCT 35.1*   *   MCV 86   MCH 29.3   MCHC 34.2       Significant Imaging: Echocardiogram:   2D echo with color flow doppler:   Results for orders placed or performed during the hospital encounter of 01/05/18   2D echo with color flow doppler   Result Value Ref Range    EF 65 55 - 65    Diastolic Dysfunction Yes (A)     Pericardial Effusion NONE     Mitral Valve Mobility NORMAL

## 2018-01-15 NOTE — SUBJECTIVE & OBJECTIVE
Interval History: Patient states he feels well today. No complaints overnight. Denies fever, chills, chest pain, palpitations.     Review of Systems   Constitutional: Negative for chills and fever.   HENT: Negative for hearing loss and tinnitus.    Respiratory: Negative for cough and shortness of breath.    Cardiovascular: Negative for chest pain and palpitations.   Gastrointestinal: Negative for abdominal pain, constipation, diarrhea and vomiting.   Genitourinary: Negative for hematuria.   Musculoskeletal: Negative for arthralgias and myalgias.   Neurological: Negative for syncope and headaches.   Psychiatric/Behavioral: Negative for agitation and confusion.     Objective:     Vital Signs (Most Recent):  Temp: 98.1 °F (36.7 °C) (01/15/18 0851)  Pulse: 73 (01/15/18 0851)  Resp: 16 (01/15/18 0851)  BP: 127/77 (01/15/18 0851)  SpO2: 97 % (01/14/18 0730) Vital Signs (24h Range):  Temp:  [98 °F (36.7 °C)-100 °F (37.8 °C)] 98.1 °F (36.7 °C)  Pulse:  [71-83] 73  Resp:  [16-19] 16  BP: (113-131)/(72-84) 127/77     Weight: 74.9 kg (165 lb 2 oz)  Body mass index is 28.34 kg/m².    Estimated Creatinine Clearance: 106.6 mL/min (based on SCr of 0.7 mg/dL).    Physical Exam   Constitutional: He is oriented to person, place, and time. He appears well-developed and well-nourished.   HENT:   Head: Atraumatic.   Eyes: Conjunctivae and EOM are normal. Pupils are equal, round, and reactive to light.   Neck: Normal range of motion.   Cardiovascular: Normal rate and regular rhythm.    Murmur heard.  Pulmonary/Chest: Effort normal and breath sounds normal.   Abdominal: Soft. Bowel sounds are normal.   Musculoskeletal: Normal range of motion.   Neurological: He is alert and oriented to person, place, and time.   Skin: Skin is dry.   Psychiatric: He has a normal mood and affect. His behavior is normal.       Significant Labs:   Blood Culture:   Recent Labs  Lab 01/05/18  0522 01/10/18  1742 01/11/18  1756 01/12/18 2056 01/12/18 2057    MAEVE No growth after 5 days. Gram stain aer bottle: Gram positive cocci in clusters resembling Staph   Results called to and read back by:Yolanda Estrada 01/11/2018  15:49  STAPHYLOCOCCUS AUREUSID consult required at [facility].For susceptibility see order # [id number]  Gram stain aer bottle: Gram positive cocci in clusters resembling Staph   Results called to and read back by: Yolanda Estrada 01/11/2018  15:48  STAPHYLOCOCCUS AUREUSID consult required at [facility]. No Growth to date  No Growth to date  No Growth to date  No Growth to date  Gram stain aer bottle: Gram positive cocci in clusters resembling Staph   Results called to and read back by: Jose Roberto Rosado RN  01/12/2018  20:14  STAPHYLOCOCCUS AUREUSID consult required at [facility].For susceptibility see order # [id number] No Growth to date  No Growth to date  No Growth to date No Growth to date  No Growth to date  No Growth to date       Significant Imaging: No new imaging

## 2018-01-15 NOTE — PROGRESS NOTES
"[facility]  Infectious Disease  Progress Note    Patient Name: Spenser Rabago  MRN: [id number]  Admission Date: 1/5/2018  Length of Stay: 10 days  Attending Physician: Carlos Henderson MD  Primary Care Provider: Vladimir Apple MD    Isolation Status: No active isolations  Assessment/Plan:      Bacteremia due to Staphylococcus aureus    - Patient with fever on 1/10. Likely nidus is L hand.   - 1/10 Blood Cx x2: MSSA   - 1/11 Blood Cx x1: Staph   -1/12 Blood Cx x2: NGTD    1/5 TTE: No vegetations.  1/12 TTE: No vegetations. Consider MARIYA given repeat positive blood cx.    - Due to new onset MSSA bacteremia with repeat cx's positive, MARIYA today.  - OK on Vancomycin for now since patient has a pen allergy. Duration pending MARIYA results              Anticipated Disposition: Pending MARIYA results    Thank you for your consult. I will follow-up with patient. Please contact us if you have any additional questions.    Sekou Carrasco MD  Infectious Disease  [facility]    Subjective:     Principal Problem:Non-traumatic rhabdomyolysis    HPI:  57 y.o. male who  has a past medical history of ADD (attention deficit disorder); Depression; Headache(784.0); Hypertension; and Migraines presents with Fall x1 day    Patient presented to ED after a fall and was found to be in rhabdomyolysis. MRI brain was performed in which he was found to have brain injuries consistent with CO poisoning. On 1/10, as patient was being prepared to be discharged to IP rehab, he spiked a fever. Blood cx's were drawn and were positive for Staph.    On exam, patient has an area of 4cm x 3cm area of cellulitis that is dark black in odor, no discharge/drainage, with malodor. He states that this wound first became apparent at admission on 1/5. Patient admits to feeling tired and  "down," denies fever, chills, chest pain, abdominal pain.  Interval History: Patient states he feels well today. No complaints overnight. Denies " fever, chills, chest pain, palpitations.     Review of Systems   Constitutional: Negative for chills and fever.   HENT: Negative for hearing loss and tinnitus.    Respiratory: Negative for cough and shortness of breath.    Cardiovascular: Negative for chest pain and palpitations.   Gastrointestinal: Negative for abdominal pain, constipation, diarrhea and vomiting.   Genitourinary: Negative for hematuria.   Musculoskeletal: Negative for arthralgias and myalgias.   Neurological: Negative for syncope and headaches.   Psychiatric/Behavioral: Negative for agitation and confusion.     Objective:     Vital Signs (Most Recent):  Temp: 98.1 °F (36.7 °C) (01/15/18 0851)  Pulse: 73 (01/15/18 0851)  Resp: 16 (01/15/18 0851)  BP: 127/77 (01/15/18 0851)  SpO2: 97 % (01/14/18 0730) Vital Signs (24h Range):  Temp:  [98 °F (36.7 °C)-100 °F (37.8 °C)] 98.1 °F (36.7 °C)  Pulse:  [71-83] 73  Resp:  [16-19] 16  BP: (113-131)/(72-84) 127/77     Weight: 74.9 kg (165 lb 2 oz)  Body mass index is 28.34 kg/m².    Estimated Creatinine Clearance: 106.6 mL/min (based on SCr of 0.7 mg/dL).    Physical Exam   Constitutional: He is oriented to person, place, and time. He appears well-developed and well-nourished.   HENT:   Head: Atraumatic.   Eyes: Conjunctivae and EOM are normal. Pupils are equal, round, and reactive to light.   Neck: Normal range of motion.   Cardiovascular: Normal rate and regular rhythm.    Murmur heard.  Pulmonary/Chest: Effort normal and breath sounds normal.   Abdominal: Soft. Bowel sounds are normal.   Musculoskeletal: Normal range of motion.   Neurological: He is alert and oriented to person, place, and time.   Skin: Skin is dry.   Psychiatric: He has a normal mood and affect. His behavior is normal.       Significant Labs:   Blood Culture:   Recent Labs  Lab 01/05/18  0522 01/10/18  1742 01/11/18  1756 01/12/18 2056 01/12/18 2057   LABBLOO No growth after 5 days. Gram stain aer bottle: Gram positive cocci in clusters  resembling Staph   Results called to and read back by:Yolanda Estrada 01/11/2018  15:49  STAPHYLOCOCCUS AUREUSID consult required at [facility].For susceptibility see order # [id number]  Gram stain aer bottle: Gram positive cocci in clusters resembling Staph   Results called to and read back by: Yolanda Estrada 01/11/2018  15:48  STAPHYLOCOCCUS AUREUSID consult required at [facility]. No Growth to date  No Growth to date  No Growth to date  No Growth to date  Gram stain aer bottle: Gram positive cocci in clusters resembling Staph   Results called to and read back by: Jose Roberto Rosado RN  01/12/2018  20:14  STAPHYLOCOCCUS AUREUSID consult required at [facility].For susceptibility see order # [id number] No Growth to date  No Growth to date  No Growth to date No Growth to date  No Growth to date  No Growth to date       Significant Imaging: No new imaging

## 2018-01-15 NOTE — PROCEDURES
Post Procedure Note: MARIYA    Under anesthesia monitored sedation, esophageal intubation was achieved without difficulty. Images were obtained in multiple views and no significant valvular abnormality nor vegetations/masses noted. Normal EF. Please see full report for details. The pt tolerated the procedure well without complications. No immediate post procedure complications.     Vitals:    01/15/18 1527 01/15/18 1542 01/15/18 1554 01/15/18 1630   BP: 103/61 112/70 114/70 130/82   BP Location: Right arm Right arm Right arm Left arm   Patient Position: Lying Lying Lying Sitting   Pulse: 63 (!) 54 65 62   Resp: 18 18 17 18   Temp:    98.2 °F (36.8 °C)   TempSrc:       SpO2:    100%   Weight:       Height:             Gen: NAD  Neck: no complications    Plan:  -Post cath care per protocol  -Continued management per primary team

## 2018-01-15 NOTE — SUBJECTIVE & OBJECTIVE
Review of Systems   Constitutional: Negative for chills and fever.   HENT: Negative for hearing loss and tinnitus.    Respiratory: Negative for cough and shortness of breath.    Cardiovascular: Negative for chest pain and palpitations.   Gastrointestinal: Negative for abdominal pain, diarrhea, nausea and vomiting.   Genitourinary: Negative for dysuria and hematuria.   Musculoskeletal: Negative for arthralgias and myalgias.   Neurological: Negative for syncope and headaches.   Psychiatric/Behavioral: Negative for agitation and confusion.     Objective:     Vital Signs (Most Recent):  Temp: 99 °F (37.2 °C) (01/13/18 0717)  Pulse: 69 (01/13/18 0717)  Resp: 19 (01/13/18 0717)  BP: 116/78 (01/13/18 0717)  SpO2: 98 % (01/13/18 0730) Vital Signs (24h Range):  Temp:  [98.5 °F (36.9 °C)-99.2 °F (37.3 °C)] 99 °F (37.2 °C)  Pulse:  [69-84] 69  Resp:  [16-19] 19  SpO2:  [97 %-98 %] 98 %  BP: (116-133)/(72-87) 116/78     Weight: 74.9 kg (165 lb 2 oz)  Body mass index is 28.34 kg/m².    Estimated Creatinine Clearance: 106.6 mL/min (based on SCr of 0.7 mg/dL).    Physical Exam   Constitutional: He is oriented to person, place, and time. He appears well-developed and well-nourished.   HENT:   Head: Normocephalic and atraumatic.   Eyes: Conjunctivae and EOM are normal. Pupils are equal, round, and reactive to light.   Neck: Normal range of motion. Neck supple.   Cardiovascular: Normal rate and regular rhythm.    Pulmonary/Chest: Effort normal and breath sounds normal.   Abdominal: Soft. Bowel sounds are normal.   Musculoskeletal: Normal range of motion.   Black eschar on L palm, 4cm x 3cm   Neurological: He is alert and oriented to person, place, and time.   Skin: Skin is dry.   Psychiatric: He has a normal mood and affect. His behavior is normal.     Interval History: Feel great    Review of Systems  Objective:     Vital Signs (Most Recent):  Temp: 100 °F (37.8 °C) (01/14/18 1855)  Pulse: 83 (01/14/18 1855)  Resp: 19 (01/14/18  1855)  BP: 116/72 (01/14/18 1855)  SpO2: 97 % (01/14/18 0730) Vital Signs (24h Range):  Temp:  [98 °F (36.7 °C)-100 °F (37.8 °C)] 100 °F (37.8 °C)  Pulse:  [67-83] 83  Resp:  [16-19] 19  SpO2:  [97 %] 97 %  BP: (113-128)/(72-79) 116/72     Weight: 74.9 kg (165 lb 2 oz)  Body mass index is 28.34 kg/m².    Estimated Creatinine Clearance: 67.8 mL/min (based on SCr of 1.1 mg/dL).    Physical Exam    Significant Labs: All pertinent labs within the past 24 hours have been reviewed.    Significant Imaging: I have reviewed all pertinent imaging results/findings within the past 24 hours.

## 2018-01-15 NOTE — PLAN OF CARE
"TN went to meet with patient. Patient off floor at this time.  TN reviewed progress notes. ID consulted on patient.  "Patient to have MARIYA today, duration of antibiotics pending results." TN informed NARDA Singh. Still working on placement for discharge.    Future Appointments  Date Time Provider Department Center   2/2/2018 3:40 PM Frank Lancaster MD [facility] [place]        01/15/18 1450   Discharge Reassessment   Assessment Type Discharge Planning Reassessment   Provided patient/caregiver education on the expected discharge date and the discharge plan No   Do you have any problems affording any of your prescribed medications? TBD   Discharge Plan A Rehab   Discharge Plan B Skilled Nursing Facility   Patient choice form signed by patient/caregiver N/A   Describe the patient's ability to walk at the present time. Walks with the help of equipment   During the past month, has the patient often been bothered by feeling down, depressed or hopeless? (Unable to assess)   During the past month, has the patient often been bothered by little interest or pleasure in doing things? (Unable to assess)     Maureen Haney RN  Transition Navigator  [phone]  "

## 2018-01-16 PROBLEM — L03.114 CELLULITIS OF LEFT HAND: Status: RESOLVED | Noted: 2018-01-11 | Resolved: 2018-01-16

## 2018-01-16 LAB
ANION GAP SERPL CALC-SCNC: 9 MMOL/L
BUN SERPL-MCNC: 20 MG/DL
CALCIUM SERPL-MCNC: 8.8 MG/DL
CHLORIDE SERPL-SCNC: 101 MMOL/L
CO2 SERPL-SCNC: 24 MMOL/L
CREAT SERPL-MCNC: 0.7 MG/DL
ERYTHROCYTE [DISTWIDTH] IN BLOOD BY AUTOMATED COUNT: 12.4 %
EST. GFR  (AFRICAN AMERICAN): >60 ML/MIN/1.73 M^2
EST. GFR  (NON AFRICAN AMERICAN): >60 ML/MIN/1.73 M^2
GLUCOSE SERPL-MCNC: 107 MG/DL
HCT VFR BLD AUTO: 34.7 %
HGB BLD-MCNC: 11.7 G/DL
MCH RBC QN AUTO: 29.1 PG
MCHC RBC AUTO-ENTMCNC: 33.7 G/DL
MCV RBC AUTO: 86 FL
PLATELET # BLD AUTO: 373 K/UL
PMV BLD AUTO: 9.3 FL
POTASSIUM SERPL-SCNC: 4.3 MMOL/L
RBC # BLD AUTO: 4.02 M/UL
SODIUM SERPL-SCNC: 134 MMOL/L
VANCOMYCIN TROUGH SERPL-MCNC: 7.2 UG/ML
WBC # BLD AUTO: 7.9 K/UL

## 2018-01-16 PROCEDURE — A4216 STERILE WATER/SALINE, 10 ML: HCPCS | Performed by: HOSPITALIST

## 2018-01-16 PROCEDURE — 97116 GAIT TRAINING THERAPY: CPT

## 2018-01-16 PROCEDURE — 80048 BASIC METABOLIC PNL TOTAL CA: CPT

## 2018-01-16 PROCEDURE — 63600175 PHARM REV CODE 636 W HCPCS: Performed by: HOSPITALIST

## 2018-01-16 PROCEDURE — 97110 THERAPEUTIC EXERCISES: CPT

## 2018-01-16 PROCEDURE — 25000003 PHARM REV CODE 250: Performed by: HOSPITALIST

## 2018-01-16 PROCEDURE — 97803 MED NUTRITION INDIV SUBSEQ: CPT

## 2018-01-16 PROCEDURE — 36415 COLL VENOUS BLD VENIPUNCTURE: CPT

## 2018-01-16 PROCEDURE — 80202 ASSAY OF VANCOMYCIN: CPT

## 2018-01-16 PROCEDURE — 11000001 HC ACUTE MED/SURG PRIVATE ROOM

## 2018-01-16 PROCEDURE — 02HV33Z INSERTION OF INFUSION DEVICE INTO SUPERIOR VENA CAVA, PERCUTANEOUS APPROACH: ICD-10-PCS | Performed by: HOSPITALIST

## 2018-01-16 PROCEDURE — 85027 COMPLETE CBC AUTOMATED: CPT

## 2018-01-16 PROCEDURE — 97535 SELF CARE MNGMENT TRAINING: CPT

## 2018-01-16 RX ORDER — RAMELTEON 8 MG/1
8 TABLET ORAL NIGHTLY PRN
Status: DISCONTINUED | OUTPATIENT
Start: 2018-01-16 | End: 2018-01-18 | Stop reason: HOSPADM

## 2018-01-16 RX ADMIN — SODIUM CHLORIDE, PRESERVATIVE FREE 3 ML: 5 INJECTION INTRAVENOUS at 09:01

## 2018-01-16 RX ADMIN — SODIUM CHLORIDE TAB 1 GM 1 G: 1 TAB at 08:01

## 2018-01-16 RX ADMIN — VANCOMYCIN HYDROCHLORIDE 1250 MG: 10 INJECTION, POWDER, LYOPHILIZED, FOR SOLUTION INTRAVENOUS at 08:01

## 2018-01-16 RX ADMIN — METOPROLOL SUCCINATE 50 MG: 50 TABLET, EXTENDED RELEASE ORAL at 09:01

## 2018-01-16 RX ADMIN — SODIUM CHLORIDE, PRESERVATIVE FREE 3 ML: 5 INJECTION INTRAVENOUS at 02:01

## 2018-01-16 RX ADMIN — ENOXAPARIN SODIUM 40 MG: 100 INJECTION SUBCUTANEOUS at 05:01

## 2018-01-16 RX ADMIN — VANCOMYCIN HYDROCHLORIDE 1250 MG: 10 INJECTION, POWDER, LYOPHILIZED, FOR SOLUTION INTRAVENOUS at 01:01

## 2018-01-16 RX ADMIN — SODIUM CHLORIDE TAB 1 GM 1 G: 1 TAB at 09:01

## 2018-01-16 RX ADMIN — SODIUM CHLORIDE, PRESERVATIVE FREE 3 ML: 5 INJECTION INTRAVENOUS at 05:01

## 2018-01-16 RX ADMIN — ASPIRIN 81 MG: 81 TABLET, COATED ORAL at 09:01

## 2018-01-16 RX ADMIN — THERA TABS 1 TABLET: TAB at 09:01

## 2018-01-16 RX ADMIN — RAMELTEON 8 MG: 8 TABLET, FILM COATED ORAL at 08:01

## 2018-01-16 NOTE — PROGRESS NOTES
"[facility]  Adult Nutrition  Progress Note    SUMMARY     Recommendations    Recommendation/Intervention:   1. Continue to encourage good intake at meals.    Goals:   Pt will meet at least 85% EEN  Nutrition Goal Status: goal met  Communication of RD Recs: reviewed with RN    Continuum of Care Plan  Referral to Outpatient Services:  (pt to d/c on Regular diet)    Reason for Assessment  Reason for Assessment: RD follow-up  Diagnosis:  (non traumatic rhabdomyolysis)  Relevent Medical History: depression, headache, ADD, HTN      General Information Comments: Pt on Regular diet with good intake at meals.     Nutrition Prescription Ordered  Current Diet Order: Regular     Evaluation of Received Nutrients/Fluid Intake  Energy Calories Required: meeting needs  Protein Required: meeting needs  I/O: 680/1307  Fluid Required: meeting needs  Comments: LBM 1/16  Tolerance: tolerating  % Intake of Estimated Energy Needs: 75 - 100 %  % Meal Intake: 75%     Nutrition Risk Screen   Nutrition Risk Screen: no indicators present    Nutrition/Diet History  Food Preferences: No Roman Catholic, food preferences     Labs/Tests/Procedures/Meds  Pertinent Labs Reviewed: reviewed  Pertinent Labs Comments: Na 132L  Pertinent Medications Reviewed: reviewed  Pertinent Medications Comments: MVI, aspirin    Physical Findings  Overall Physical Appearance: overweight  Tubes:  (none)  Oral/Mouth Cavity:  (unable to assess)  Skin:  (Ashish 20-abrasions)    Anthropometrics  Temp: 98.2 °F (36.8 °C)  Height: 5' 4" (162.6 cm)  Weight Method: Bed Scale  Weight: 74.9 kg (165 lb 2 oz)  Ideal Body Weight (IBW), Male: 130 lb  % Ideal Body Weight, Male (lb): 127.87 lb  BMI (Calculated): 28.6  BMI Grade: 25 - 29.9 - overweight     Estimated/Assessed Needs  Weight Used For Calorie Calculations: 74.8 kg (164 lb 14.5 oz)   Energy Calorie Requirements (kcal): 2710-5186 kcal  Energy Need Method: Colebrook-St Jeor  RMR (Colebrook-St. Jeor Equation): " 1479  Weight Used For Protein Calculations: 74.8 kg (164 lb 14.5 oz)  Protein Requirements: 75g (1.0g/kg)  Fluid Need Method: RDA Method  RDA Method (mL): 1800     Assessment and Plan  No nutrition dx at this time    Monitor and Evaluation  Food and Nutrient Intake: food and beverage intake  Food and Nutrient Adminstration: diet order  Physical Activity and Function: nutrition-related ADLs and IADLs  Anthropometric Measurements: weight  Biochemical Data, Medical Tests and Procedures: electrolyte and renal panel  Nutrition-Focused Physical Findings: overall appearance    Nutrition Risk  Level of Risk:  (1xweekly)    Nutrition Follow-Up  RD Follow-up?: Yes

## 2018-01-16 NOTE — PLAN OF CARE
Problem: Patient Care Overview  Goal: Plan of Care Review  Outcome: Ongoing (interventions implemented as appropriate)  Recommendation/Intervention:   1. Continue to encourage good intake at meals.    Goals:   Pt will meet at least 85% EEN  Nutrition Goal Status: goal met  Communication of RD Recs: reviewed with RN

## 2018-01-16 NOTE — PROGRESS NOTES
[facility] Hospital Medicine  Progress Note    Patient Name: Spenser Rabago  MRN: [id number]  Patient Class: IP- Inpatient   Admission Date: 1/5/2018  Length of Stay: 11 days  Attending Physician: Carlos Henderson MD  Primary Care Provider: Vladimir Apple MD        Subjective:     Principal Problem:Non-traumatic rhabdomyolysis    HPI:  Spenser Rabago is a 57 y.o. white man with hypertension, migraine headaches, attention deficit hyperactivity disorder, and depression.  He lives in [place], [place].  His primary care physician is Dr. Vladimir Apple.  His neurologist is Dr. Frank Lancaster.   He was taken by EMS to [facility] on 1/5/18 after falling and being on the floor for at least 6 hours.  Four days prior to this, a box fell on his right foot and he had pain and difficulty walking.  He was falling a lot after that.  He also had decreased appetite and oral intake the past week.  He was found to have dehydration (sodium 127, BUN 47, urine with ketones and hyaline casts), high anion gap acidosis (bicarbonate 18, anion gap 20), and rhabdomyolysis (CPK 1759).  He was admitted to [facility].    Hospital Course:  Rhabdomyolysis resolved with IV fluids.  He was found to have right greater than left lower extremity weakness.  MRI showed bilateral hyperintensity in the globus pallidi and throughout the supratentorial white matter.  Neurology was concerned for carbon monoxide exposure/toxicity.  His carboxyhemoglobin was within normal limits.  Neurology recommended hyperbaric oxygen therapy, but director of hyperbarics Dr. Alaina Kuo said that was only indicated in acute CO poisoning.  His family went to his house and found black mold in the vents.  Inpatient rehab was sought and he waited around for a few days.  Lisinopril was stopped because his blood pressures were controlled on metoprolol alone.  Alprazolam was stopped because he no longer had anxiety  likely due to the carbon monoxide brain damage.              He was set up for discharge to inpatient rehab, but on 1/10/18, he suddenly developed new fever.  Urinalysis showed no evidence for UTI.  Chest x-ray showed no pneumonia.  Influenza swab was negative.  CBC showed new leukocytosis.  Procalcitonin was elevated.  He denied any specific symptoms except for mild cough.  His left hand distal to a peripheral IV was swollen, with mild redness and tenderness at the IV site.  Doxycycline was started for suspected cellulitis.  Peripheral IVs were removed.  The blood cultures grew methicillin-sensitive Staphylococcus aureus so a new peripheral IV was placed, doxycycline was changed to IV vancomycin, and Infectious Disease was consulted.  Leukocytosis resolved after initiating antibiotics.  Infectious Disease noted the hand cellulitis, but Mr. Rabago told them that it had been there since admission.  They recommended repeating the echocardiogram, after hearing a systolic murmur.  Transthoracic echocardiogram showed no evidence of vegetation.  Tissue ultrasound of the left hand was unremarkable.  Infectious Disease recommended transesophageal echocardiogram to evaluate for endocarditis, and there was no vegetation seen.  A right arm PICC was placed.  He will complete vancomycin on 1/26/18 with a goal vancomycin trough of 15-20.    Interval History: PICC in arm, no complaints.    Review of Systems   Respiratory: Negative for shortness of breath.    Cardiovascular: Negative for chest pain and palpitations.   Gastrointestinal: Negative for nausea and vomiting.     Objective:     Vital Signs (Most Recent):  Temp: 98.2 °F (36.8 °C) (01/16/18 1203)  Pulse: 67 (01/16/18 1203)  Resp: 16 (01/16/18 1203)  BP: 130/82 (01/16/18 1203)  SpO2: 100 % (01/15/18 1630) Vital Signs (24h Range):  Temp:  [97 °F (36.1 °C)-99.1 °F (37.3 °C)] 98.2 °F (36.8 °C)  Pulse:  [] 67  Resp:  [16-18] 16  SpO2:  [100 %] 100 %  BP: (103-139)/(61-93)  130/82     Weight: 74.9 kg (165 lb 2 oz)  Body mass index is 28.34 kg/m².    Intake/Output Summary (Last 24 hours) at 01/16/18 1323  Last data filed at 01/16/18 0800   Gross per 24 hour   Intake             1255 ml   Output             1000 ml   Net              255 ml      Physical Exam   Constitutional: He is oriented to person, place, and time. He appears well-developed and well-nourished. No distress.   Pulmonary/Chest: Effort normal. No respiratory distress.   Neurological: He is alert and oriented to person, place, and time.   Skin: Skin is warm and dry.   Psychiatric: He has a normal mood and affect.   Nursing note and vitals reviewed.      Significant Labs:   CBC:     Recent Labs  Lab 01/15/18  0426 01/16/18  0124   WBC 8.39 7.90   HGB 12.0* 11.7*   HCT 35.1* 34.7*   * 373*     CMP:     Recent Labs  Lab 01/15/18  0426 01/16/18  0124   * 134*   K 4.2 4.3    101   CO2 24 24    107   BUN 18 20   CREATININE 0.7 0.7   CALCIUM 9.1 8.8   ANIONGAP 8 9   EGFRNONAA >60 >60     Magnesium:   No results for input(s): MG in the last 48 hours.    Significant Imaging: I have reviewed all pertinent imaging results/findings within the past 24 hours.     Assessment/Plan:      Bacteremia due to Staphylococcus aureus    Probably caused by abrasions from his fall.  Started vancomycin.  Appreciate ID and Cardiology.  MARIYA showed no vegetation.  Vancomycin until 1/26/18.  Not therapeutic yet and ID advised no discharge until it is therapeutic.          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  Appreciate Neurology.  Hyperbaric oxygen therapy not indicated per Dr. Kuo.  Inpatient rehab.        Attention deficit hyperactivity disorder (ADHD)    Holding Adderall for now.         Essential hypertension    Resume home Toprol XL.  Holding lisinopril.  Monitor.         Depression    Will resume trazodone and Wellbutrin in coming days.            Mixed migraine and muscle contraction headache    Currently has alprazolam, baclofen, Percocet, Opana ER, tizanadine, and topiramate on his med list in our system.  He could not confirm medications at this time.  His primary care physician and neurologist are [facility] so should be correct.  Not complaining of any headaches or other pain at this time.           VTE Risk Mitigation         Ordered     enoxaparin injection 40 mg  Daily     Route:  Subcutaneous        01/05/18 1000     High Risk of VTE  Once      01/05/18 1000     Reason for No Pharmacological VTE Prophylaxis  Once      01/05/18 1000     Place sequential compression device  Until discontinued      01/05/18 1000        Disposition: [facility] when vancomycin therapeutic.      Carlos Henderson MD  Department of Hospital Medicine   [facility]

## 2018-01-16 NOTE — SUBJECTIVE & OBJECTIVE
Interval History: Patient states he feels well. No complaints overnight.    Review of Systems   Constitutional: Negative for chills and fever.   HENT: Negative for hearing loss and tinnitus.    Respiratory: Negative for cough and shortness of breath.    Cardiovascular: Negative for chest pain and palpitations.   Gastrointestinal: Negative for abdominal pain, diarrhea, nausea and vomiting.   Genitourinary: Negative for dysuria and hematuria.   Musculoskeletal: Negative for arthralgias and myalgias.   Neurological: Negative for dizziness and syncope.   Psychiatric/Behavioral: Negative for agitation and confusion.     Objective:     Vital Signs (Most Recent):  Temp: 99.1 °F (37.3 °C) (01/16/18 0741)  Pulse: 80 (01/16/18 0741)  Resp: 16 (01/16/18 0741)  BP: (!) 138/93 (01/16/18 0741)  SpO2: 100 % (01/15/18 1630) Vital Signs (24h Range):  Temp:  [97 °F (36.1 °C)-99.1 °F (37.3 °C)] 99.1 °F (37.3 °C)  Pulse:  [] 80  Resp:  [16-18] 16  SpO2:  [100 %] 100 %  BP: (103-139)/(61-93) 138/93     Weight: 74.9 kg (165 lb 2 oz)  Body mass index is 28.34 kg/m².    Estimated Creatinine Clearance: 106.6 mL/min (based on SCr of 0.7 mg/dL).    Physical Exam   Constitutional: He is oriented to person, place, and time. He appears well-developed and well-nourished.   HENT:   Head: Normocephalic and atraumatic.   Eyes: Conjunctivae and EOM are normal. Pupils are equal, round, and reactive to light.   Neck: Normal range of motion. Neck supple.   Cardiovascular: Normal rate and regular rhythm.    Murmur heard.  Pulmonary/Chest: Effort normal and breath sounds normal.   Abdominal: Soft. Bowel sounds are normal.   Musculoskeletal: Normal range of motion.   L hand eschar, stable   Neurological: He is alert and oriented to person, place, and time.   Skin: Skin is warm and dry.   Psychiatric: He has a normal mood and affect. His behavior is normal.       Significant Labs:   Blood Culture:   Recent Labs  Lab 01/05/18  0522 01/10/18  1741  01/11/18 1756 01/12/18 2056 01/12/18 2057   [facility] No growth after 5 days. Gram stain aer bottle: Gram positive cocci in clusters resembling Staph   Results called to and read back by:Yolanda Estrada 01/11/2018  15:49  STAPHYLOCOCCUS AUREUSID consult required at [facility].For susceptibility see order # [id number]  Gram stain aer bottle: Gram positive cocci in clusters resembling Staph   Results called to and read back by: Yolanda Estrada 01/11/2018  15:48  STAPHYLOCOCCUS AUREUSID consult required at [facility]. No Growth to date  No Growth to date  No Growth to date  No Growth to date  No Growth to date  Gram stain aer bottle: Gram positive cocci in clusters resembling Staph   Results called to and read back by: Jose Roberto Rosado RN  01/12/2018  20:14  STAPHYLOCOCCUS AUREUSID consult required at [facility].For susceptibility see order # [id number] No Growth to date  No Growth to date  No Growth to date  No Growth to date No Growth to date  No Growth to date  No Growth to date  No Growth to date       Significant Imaging: MARIYA: no vegetations, no evidence of endocarditis

## 2018-01-16 NOTE — PLAN OF CARE
Problem: Patient Care Overview  Goal: Plan of Care Review  Outcome: Revised  Pt stable. Neuro intact with some weakness to bilateral extremities. Pt turned himself frequently throughout the night. Iv antibiotic administered. Feet remains in Z-flex boots. Commode at the bedside. Fall precautions maintained.

## 2018-01-16 NOTE — PROGRESS NOTES
The Sw spoke to Noon at [facility] and she states she will review the info. The Sw spoke to the ID team and they states the pt's Vanc levels are low and must come up before he can d/c. The team states the pt may be ready tomorrow or Thursday. Noon states she will put the date in for Thursday for the [employer] auth.

## 2018-01-16 NOTE — PROGRESS NOTES
[facility]  Infectious Disease  Progress Note    Patient Name: Spenser Rabago  MRN: [id number]  Admission Date: 1/5/2018  Length of Stay: 11 days  Attending Physician: Carlos Henderson MD  Primary Care Provider: Vladimir Apple MD    Isolation Status: No active isolations  Assessment/Plan:      Bacteremia due to Staphylococcus aureus    - Patient with fever on 1/10. Likely nidus is L hand.   - 1/10 Blood Cx x2: MSSA   - 1/11 Blood Cx x1: Staph   -1/12 Blood Cx x2: NGTD    1/5 TTE: No vegetations.  1/12 TTE: No vegetations.  1/15 MARIYA: No Vegetations    - Abx: Ok to continue on Vancomycin IV (due to PCN allergy).   - Dose: Goal trough 15-20 for bacteremia. Currently, not at goal. Will need weekly Vanc trough, CBC, & CMP once therapeutic.  - Duration: MARIYA negtative. 2wks IV abx from 1/12/18 (1st negative blood cx). End date 1/26/18.        Cellulitis of left hand    - On exam, 4cm x 3cm area of cellulitis, black in color. No discharge/drainage  - As per patient, started on day of admission 1/5  - US L Hand: no fluid collections    - Recommend Ortho Hand Surgery Consult  - This is likely nidus for MSSA.            Anticipated Disposition: Final Recommendations by ID. Stable for discharge from ID standpoint    Thank you for your consult. I will sign off. Please contact us if you have any additional questions.    Sekou Carrasco MD  Infectious Disease  [facility]    Subjective:     Principal Problem:Non-traumatic rhabdomyolysis    HPI:  57 y.o. male who  has a past medical history of ADD (attention deficit disorder); Depression; Headache(784.0); Hypertension; and Migraines presents with Fall x1 day    Patient presented to ED after a fall and was found to be in rhabdomyolysis. MRI brain was performed in which he was found to have brain injuries consistent with CO poisoning. On 1/10, as patient was being prepared to be discharged to IP rehab, he spiked a fever. Blood cx's were drawn and  "were positive for Staph.    On exam, patient has an area of 4cm x 3cm area of cellulitis that is dark black in odor, no discharge/drainage, with malodor. He states that this wound first became apparent at admission on 1/5. Patient admits to feeling tired and  "down," denies fever, chills, chest pain, abdominal pain.  Interval History: Patient states he feels well. No complaints overnight.    Review of Systems   Constitutional: Negative for chills and fever.   HENT: Negative for hearing loss and tinnitus.    Respiratory: Negative for cough and shortness of breath.    Cardiovascular: Negative for chest pain and palpitations.   Gastrointestinal: Negative for abdominal pain, diarrhea, nausea and vomiting.   Genitourinary: Negative for dysuria and hematuria.   Musculoskeletal: Negative for arthralgias and myalgias.   Neurological: Negative for dizziness and syncope.   Psychiatric/Behavioral: Negative for agitation and confusion.     Objective:     Vital Signs (Most Recent):  Temp: 99.1 °F (37.3 °C) (01/16/18 0741)  Pulse: 80 (01/16/18 0741)  Resp: 16 (01/16/18 0741)  BP: (!) 138/93 (01/16/18 0741)  SpO2: 100 % (01/15/18 1630) Vital Signs (24h Range):  Temp:  [97 °F (36.1 °C)-99.1 °F (37.3 °C)] 99.1 °F (37.3 °C)  Pulse:  [] 80  Resp:  [16-18] 16  SpO2:  [100 %] 100 %  BP: (103-139)/(61-93) 138/93     Weight: 74.9 kg (165 lb 2 oz)  Body mass index is 28.34 kg/m².    Estimated Creatinine Clearance: 106.6 mL/min (based on SCr of 0.7 mg/dL).    Physical Exam   Constitutional: He is oriented to person, place, and time. He appears well-developed and well-nourished.   HENT:   Head: Normocephalic and atraumatic.   Eyes: Conjunctivae and EOM are normal. Pupils are equal, round, and reactive to light.   Neck: Normal range of motion. Neck supple.   Cardiovascular: Normal rate and regular rhythm.    Murmur heard.  Pulmonary/Chest: Effort normal and breath sounds normal.   Abdominal: Soft. Bowel sounds are normal. "   Musculoskeletal: Normal range of motion.   L hand eschar, stable   Neurological: He is alert and oriented to person, place, and time.   Skin: Skin is warm and dry.   Psychiatric: He has a normal mood and affect. His behavior is normal.       Significant Labs:   Blood Culture:   Recent Labs  Lab 01/05/18  0522 01/10/18  1742 01/11/18  1756 01/12/18  2056 01/12/18 2057   [facility] No growth after 5 days. Gram stain aer bottle: Gram positive cocci in clusters resembling Staph   Results called to and read back by:Yolanda Estrada 01/11/2018  15:49  STAPHYLOCOCCUS AUREUSID consult required at [facility].For susceptibility see order # [id number]  Gram stain aer bottle: Gram positive cocci in clusters resembling Staph   Results called to and read back by: Yolanda Estrada 01/11/2018  15:48  STAPHYLOCOCCUS AUREUSID consult required at [facility]. No Growth to date  No Growth to date  No Growth to date  No Growth to date  No Growth to date  Gram stain aer bottle: Gram positive cocci in clusters resembling Staph   Results called to and read back by: Jose Roberto Rosado RN  01/12/2018  20:14  STAPHYLOCOCCUS AUREUSID consult required at [facility].For susceptibility see order # [id number] No Growth to date  No Growth to date  No Growth to date  No Growth to date No Growth to date  No Growth to date  No Growth to date  No Growth to date       Significant Imaging: MARIYA: no vegetations, no evidence of endocarditis

## 2018-01-16 NOTE — PROGRESS NOTES
The Sw received a message form Noon at [facility] requesting the MARIYA results,current MAR and duration of the iv abx. The duration currently is unknown but the Sw faxed the rest of the info to her via [facility].     1:09pm The Sw faxed the id notes to Noon at [facility] with the duration for the iv abx.

## 2018-01-16 NOTE — PLAN OF CARE
Problem: Occupational Therapy Goal  Goal: Occupational Therapy Goal  Goals to be met by: 2/5     Patient will increase functional independence with ADLs by performing:    UE Dressing with Set-up Assistance.  Grooming while standing at sink with Moderate Assistance. --MET 1/16/18  Toileting from toilet with Moderate Assistance for hygiene and clothing management.   Supine to sit with Stand-by Assistance. --MET 1/15/18  Toilet transfer to toilet with Moderate Assistance.  Increased functional strength to 4/5 for BUE.      Outcome: Ongoing (interventions implemented as appropriate)  Patient continues to improve with strength, functional mobility, and participation. Patient's affect appears brighter and with increased conversation. Patient remains appropriate for IPR to address functional deficits.

## 2018-01-16 NOTE — PT/OT/SLP PROGRESS
Occupational Therapy   Treatment    Name: Spenser Rabago  MRN: [id number]  Admitting Diagnosis:  Non-traumatic rhabdomyolysis  1 Day Post-Op    Recommendations:     Discharge Recommendations: rehabilitation facility  Discharge Equipment Recommendations:   (Defer to IPR)  Barriers to discharge:  Decreased caregiver support    Subjective     Communicated with: Shanice lopez prior to session.  Pain/Comfort:  · Pain Rating 1: 0/10  · Pain Rating Post-Intervention 1: 0/10    Objective:     Patient found with: bed alarm, pressure relief boots, peripheral IV    General Precautions: Standard, fall   Orthopedic Precautions:N/A   Braces: N/A     Bed Mobility:    · Patient completed Scooting/Bridging with stand by assistance  · Patient completed Supine to Sit with stand by assistance     Functional Mobility/Transfers:  · Patient completed Sit <> Stand Transfer with contact guard assistance  with  rolling walker   · Patient completed Bed <> Chair Transfer using Step Transfer technique with contact guard assistance with rolling walker    Activities of Daily Living:  · Grooming: stand by assistance and contact guard assistance      Patient left up in chair with all lines intact, call button in reach and RN notified    [facility] 6 Click:  [facility] Total Score: 18    Treatment & Education:  Patient with bed mob as noted above. Spoke about how he used the BSC earlier this morning with [facility]. Patient stood with CGA using RW and ambulated to bathroom with CGA. Completed oral care, facial hygiene and hair grooming standing at sink with CGA and min (A) for 1 slight LOB. Patient ambulated to bedside chair and requested to shave. Given items for electric razor shave and completed with Min (A).   Education:    Assessment:   Patient continues to improve with strength, functional mobility, and participation. Patient's affect appears brighter and with increased conversation. Patient remains appropriate for IPR to address functional deficits.      Spenser Rabago is a 57 y.o. male with a medical diagnosis of Non-traumatic rhabdomyolysis. Performance deficits affecting function are weakness, impaired endurance, impaired self care skills, impaired functional mobilty, gait instability, impaired balance, impaired sensation, impaired cognition, decreased coordination, decreased upper extremity function, impaired skin, pain, decreased lower extremity function.      Rehab Prognosis:  Good; patient would benefit from acute skilled OT services to address these deficits and reach maximum level of function.       Plan:     Patient to be seen 5 x/week to address the above listed problems via self-care/home management, therapeutic activities, therapeutic exercises  · Plan of Care Expires: 02/05/18  · Plan of Care Reviewed with: patient    This Plan of care has been discussed with the patient who was involved in its development and understands and is in agreement with the identified goals and treatment plan    GOALS:    Occupational Therapy Goals        Problem: Occupational Therapy Goal    Goal Priority Disciplines Outcome Interventions   Occupational Therapy Goal     OT, PT/OT Ongoing (interventions implemented as appropriate)    Description:  Goals to be met by: 2/5     Patient will increase functional independence with ADLs by performing:    UE Dressing with Set-up Assistance.  Grooming while standing at sink with Moderate Assistance. --MET 1/16/18  Toileting from toilet with Moderate Assistance for hygiene and clothing management.   Supine to sit with Stand-by Assistance. --MET 1/15/18  Toilet transfer to toilet with Moderate Assistance.  Increased functional strength to 4/5 for BUE.                        Time Tracking:     OT Date of Treatment: 01/16/18  OT Start Time: 0901  OT Stop Time: 0941  OT Total Time (min): 40 min    Billable Minutes:Self Care/Home Management 40    TING Garay  1/16/2018

## 2018-01-16 NOTE — SUBJECTIVE & OBJECTIVE
Interval History: PICC in arm, no complaints.    Review of Systems   Respiratory: Negative for shortness of breath.    Cardiovascular: Negative for chest pain and palpitations.   Gastrointestinal: Negative for nausea and vomiting.     Objective:     Vital Signs (Most Recent):  Temp: 98.2 °F (36.8 °C) (01/16/18 1203)  Pulse: 67 (01/16/18 1203)  Resp: 16 (01/16/18 1203)  BP: 130/82 (01/16/18 1203)  SpO2: 100 % (01/15/18 1630) Vital Signs (24h Range):  Temp:  [97 °F (36.1 °C)-99.1 °F (37.3 °C)] 98.2 °F (36.8 °C)  Pulse:  [] 67  Resp:  [16-18] 16  SpO2:  [100 %] 100 %  BP: (103-139)/(61-93) 130/82     Weight: 74.9 kg (165 lb 2 oz)  Body mass index is 28.34 kg/m².    Intake/Output Summary (Last 24 hours) at 01/16/18 1323  Last data filed at 01/16/18 0800   Gross per 24 hour   Intake             1255 ml   Output             1000 ml   Net              255 ml      Physical Exam   Constitutional: He is oriented to person, place, and time. He appears well-developed and well-nourished. No distress.   Pulmonary/Chest: Effort normal. No respiratory distress.   Neurological: He is alert and oriented to person, place, and time.   Skin: Skin is warm and dry.   Psychiatric: He has a normal mood and affect.   Nursing note and vitals reviewed.      Significant Labs:   CBC:     Recent Labs  Lab 01/15/18  0426 01/16/18  0124   WBC 8.39 7.90   HGB 12.0* 11.7*   HCT 35.1* 34.7*   * 373*     CMP:     Recent Labs  Lab 01/15/18  0426 01/16/18  0124   * 134*   K 4.2 4.3    101   CO2 24 24    107   BUN 18 20   CREATININE 0.7 0.7   CALCIUM 9.1 8.8   ANIONGAP 8 9   EGFRNONAA >60 >60     Magnesium:   No results for input(s): MG in the last 48 hours.    Significant Imaging: I have reviewed all pertinent imaging results/findings within the past 24 hours.

## 2018-01-16 NOTE — PLAN OF CARE
Problem: Physical Therapy Goal  Goal: Physical Therapy Goal  Goals to be met by: 18     Patient will increase functional independence with mobility by performin. Supine to sit with Stand-by Assistance  2. Sit to supine with Stand-by Assistance MET 2018  3. Sit to stand transfer with Moderate Assistance MET 2018  4. Bed to chair transfer with Moderate Assistance using least restirctive AD MET 2018  5.  Assess gait     Updated Goals:  1. Supine<>sit with SBA  2. Sit to stand to SBA with RW  3. Bed to chair transfers with RW and CGA  4. Gait x 20 ft with RW and CGA      Pt progressing towards goals, sup to sit mod Ind , sit to stand SBA, amb'd with RW and CGA/occ Karan ~30' x 2 trials,seated rest b/w. Recommend cont PT in REHAB.

## 2018-01-16 NOTE — ASSESSMENT & PLAN NOTE
Probably caused by abrasions from his fall.  Started vancomycin.  Appreciate ID and Cardiology.  MARIYA showed no vegetation.  Vancomycin until 1/26/18.  Not therapeutic yet and ID advised no discharge until it is therapeutic.

## 2018-01-16 NOTE — PROCEDURES
"Spenser Rabago is a 57 y.o. male patient.    Temp: 99.1 °F (37.3 °C) (01/16/18 0741)  Pulse: 80 (01/16/18 0741)  Resp: 16 (01/16/18 0741)  BP: (!) 138/93 (01/16/18 0741)  SpO2: 100 % (01/15/18 1630)  Weight: 74.9 kg (165 lb 2 oz) (01/06/18 0533)  Height: 5' 4" (162.6 cm) (01/05/18 1700)    PICC  Date/Time: 1/16/2018 10:38 AM  Performed by: TONY RIGGINS  Consent Done: Yes  Time out: Immediately prior to procedure a time out was called to verify the correct patient, procedure, equipment, support staff and site/side marked as required  Indications: med administration and vascular access  Anesthesia: local infiltration  Local anesthetic: lidocaine 1% without epinephrine  Anesthetic Total (mL): 5  Preparation: skin prepped with ChloraPrep  Skin prep agent dried: skin prep agent completely dried prior to procedure  Sterile barriers: all five maximum sterile barriers used - cap, mask, sterile gown, sterile gloves, and large sterile sheet  Hand hygiene: hand hygiene performed prior to central venous catheter insertion  Location details: right basilic  Catheter type: double lumen  Catheter size: 5 Fr  Catheter Length: 38cm    Ultrasound guidance: yes  Vessel Caliber: large and patent, compressibility normal  Needle advanced into vessel with real time Ultrasound guidance.  Guidewire confirmed in vessel.  Sterile sheath used.  Number of attempts: 1  Post-procedure: blood return through all ports and sterile dressing applied  Estimated blood loss (mL): 1    Assessment: placement verified by x-ray, no pneumothorax on x-ray, tip termination and successful placement  Complications: none  Comments: CXR VIEWED. WAIT FOR RAD REPORT AND MD REVIEW BEFORE USING        Tony Riggins  1/16/2018  "

## 2018-01-16 NOTE — PLAN OF CARE
[facility]  Manjit Farnsworth MD, [facility]     MD Lm Mosley FNP Renee Melancon, PA-C Rosanne Zeringue, NP  [address] [place] [id number]  Office: [phone]  Fax: [phone]    FACILITY TRANSFER ORDERS    Patient Name: Spenser Rabago  YOB: 1960    01/18/2018    Admit to [facility]                                                 Diagnoses:  Active Hospital Problems    Diagnosis  POA    Bacteremia due to Staphylococcus aureus [R78.81]  No    Toxic effect of carbon monoxide, unintentional [T58.91XA]  Yes    Right leg weakness [R29.898]  Yes    Essential hypertension [I10]  Yes     Chronic    Attention deficit hyperactivity disorder (ADHD) [F90.9]  Yes     Chronic    Depression [F32.9]  Yes    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, 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]  Yes     Chronic      Resolved Hospital Problems    Diagnosis Date Resolved POA    *Non-traumatic rhabdomyolysis [M62.82] 01/09/2018 Yes    Cellulitis of left hand [L03.114] 01/16/2018 No    Fever [R50.9] 01/13/2018 No    Fall [W19.XXXA] 01/09/2018 Yes    Dehydration with hyponatremia [E87.1] 01/15/2018 Yes    Azotemia [R79.89] 01/09/2018 Yes    Abnormal liver enzymes [R74.8] 01/09/2018 Yes    Leukocytosis [D72.829] 01/08/2018 Yes    Metabolic acidosis [E87.2] 01/09/2018 Yes     Allergies:  Review of patient's allergies indicates:   Allergen Reactions    Penicillins Hives       Discharge Procedure Orders  Ambulatory Referral to Psychiatry   Referral Priority: Routine Referral Type: Psychiatric   Referral Reason: Specialty Services Required    Requested Specialty: Psychiatry    Number of Visits Requested: 1      Diet Adult Regular     Vital signs per facility protocol     Intake and output per facility protocol     Skin assessment every shift      Notify Physician   Order Specific Question Answer Comments   Temperature (F) greater  than 101    Systolic Blood Pressure SBP greater than or equal to 160    Systolic Blood Pressure SBP less than or equal to 90    Diastolic Blood Pressure DBP greater than or equal to 110    Diastolic Blood Pressure DBP less than or equal to 60    Pulse greater than or equal to 120    Pulse less than or equal to 50    Respirations Rate RR greater than or equal to 30    Respirations Rate RR less than or equal to 6    Urine output less than 60 over 2 hours   SPO2% less than 90      Activity: Per rehab recommendations     Activity as tolerated     Full code     Pulse Oximetry       LABS:  Per facility protocol   Vancomycin trough weekly (goal trough 15-20), CBC weekly, CMP weekly until 1/26/18     CONSULTS:      Physical Therapy to evaluate and treat     Occupational Therapy to evaluate and treat      PICC Care:   Keep PICC insertion site clean and dry  Change PICC dressing every 7 days and PRN  Always clamp the PICC when not in use. Use only 10 mL syringes to flush PICC; flush each lumen of PICC every 6 hours with 10 mL preservative-free saline flush solution and sodium chloride 0.9% flush 10 mL every 6 hours as needed.  Blood draw PICC protocol - Stop infusions to all lumens, withdraw 5 mL of blood with 10 mL syringe and discard. Flush lumen with 20 mL preservative-free saline flush solution and resume infusions.  Always clamp PICC tubing prior to removing the syringe after flushin. Never clamp the PICC with a hemostat or device that could puncture the line.  Designate one port (PICC) exclusively for the administration of hyperalimentation when applicable.  Blood draw hyperalimentation infusing protocol - Stop infusions to all lumens, flush line with 20 mL preservative-free normal saline, withdraw 5 mL of blood with 10 mL syringe and discard. Flush lumen with 20 mL preservative-free saline flush solution and resume lumens.  Measure arm circumference daily.    Remove PICC after vancomycin finished on  1/26/18.    Medications:    Spenser Rabago   Home Medication Instructions ALANA:[id number]    Printed on:01/18/18 1003   Medication Information                      metoprolol succinate (TOPROL-XL) 50 MG 24 hr tablet  Take 1 tablet (50 mg total) by mouth once daily.             sodium chloride 0.9% 0.9 % SolP 250 mL with vancomycin 1,000 mg SolR 1,250 mg  Inject 1,250 mg into the vein every 8 (eight) hours. Until 1/26/18 for Staphylococcus aureus bacteremia             traZODone (DESYREL) 50 MG tablet  Take 1 tablet (50 mg total) by mouth every evening.                   Manjit Farnsworth MD  01/18/2018

## 2018-01-16 NOTE — PLAN OF CARE
Problem: Patient Care Overview  Goal: Plan of Care Review  Outcome: Ongoing (interventions implemented as appropriate)  Plan of care reviewed with patient. Right upper arm PICC line placed today for long term antibiotic therapy. Patient tolerated well. IV antibiotics administered for infection. Bed locked and low, call bell on, side rails up x2, and call bell in reach. No falls or injuries occurred to patient during shift. Tele monitor on patient. No red alarms or ectopy noted during shift. Patient verbalizes clear understanding of plan of care.

## 2018-01-16 NOTE — PT/OT/SLP PROGRESS
"Physical Therapy Treatment    Patient Name:  Spenser Rabago   MRN:  [id number]    Recommendations:     Discharge Recommendations:  rehabilitation facility   Discharge Equipment Recommendations:  (TBD in REHAB)   Barriers to discharge: Decreased caregiver support    Assessment:     Spenser Rabago is a 57 y.o. male admitted with a medical diagnosis of Non-traumatic rhabdomyolysis.  He presents with the following impairments/functional limitations:  weakness, impaired endurance, impaired functional mobilty, impaired self care skills, impaired balance, gait instability, pain, impaired cognition, decreased lower extremity function, decreased ROM ; pt with improved mobility today, increased amb distance.    Rehab Prognosis:  good; patient would benefit from acute skilled PT services to address these deficits and reach maximum level of function.      Recent Surgery: Procedure(s) (LRB):  MARIYA During Cath Case (N/A) 1 Day Post-Op    Plan:     During this hospitalization, patient to be seen 6 x/week to address the above listed problems via gait training, therapeutic activities, therapeutic exercises  · Plan of Care Expires:  02/04/18   Plan of Care Reviewed with: patient    Subjective     Communicated with nurse prior to session.  Patient found supine in bed upon PT entry to room, agreeable to treatment.      Chief Complaint: Pt reports some R foot pain/discomfort  Patient comments/goals: "It feels like pins and needles" regarding R foot.  Pain/Comfort:  · Pain Rating 1: 5/10  · Location - Side 1: Right  · Location 1: foot  · Pain Addressed 1: Reposition, Distraction  · Pain Rating Post-Intervention 1: 5/10    Patients cultural, spiritual, Worship conflicts given the current situation: n/a    Objective:     Patient found with: bed alarm, peripheral IV     General Precautions: Standard, fall   Orthopedic Precautions:N/A   Braces: N/A     Functional Mobility:  · Bed Mobility:     · Supine to Sit: modified " independence  · Transfers:     · Sit to Stand:  stand by assistance with rolling walker  · Gait: amb'd with RW and CGA (occ min A for RW mgmt) ~30' x 2      AM-PAC 6 CLICK MOBILITY  Turning over in bed (including adjusting bedclothes, sheets and blankets)?: 4  Sitting down on and standing up from a chair with arms (e.g., wheelchair, bedside commode, etc.): 3  Moving from lying on back to sitting on the side of the bed?: 4  Moving to and from a bed to a chair (including a wheelchair)?: 3  Need to walk in hospital room?: 3  Climbing 3-5 steps with a railing?: 3  Total Score: 20       Therapeutic Activities and Exercises:   pt perf'd seated LE ex's of AP's (LLE only, RLE PROM), LAQ's, hip flexion x 10 ea.     Patient left up in chair with all lines intact, call button in reach and nurse notified..    GOALS:    Physical Therapy Goals        Problem: Physical Therapy Goal    Goal Priority Disciplines Outcome Goal Variances Interventions   Physical Therapy Goal     PT/OT, PT Ongoing (interventions implemented as appropriate)     Description:  Goals to be met by: 18     Patient will increase functional independence with mobility by performin. Supine to sit with Stand-by Assistance  2. Sit to supine with Stand-by Assistance MET 2018  3. Sit to stand transfer with Moderate Assistance MET 2018  4. Bed to chair transfer with Moderate Assistance using least restirctive AD MET 2018  5.  Assess gait     Updated Goals:  1. Supine<>sit with SBA  2. Sit to stand to SBA with RW  3. Bed to chair transfers with RW and CGA  4. Gait x 20 ft with RW and CGA                       Time Tracking:     PT Received On: 18  PT Start Time: 1126     PT Stop Time: 1150  PT Total Time (min): 24 min     Billable Minutes: Gait Training 14 and Therapeutic Exercise 10    Treatment Type: Treatment  PT/PTA: PTA     PTA Visit Number: 1     Vero Wade PTA  2018

## 2018-01-16 NOTE — ASSESSMENT & PLAN NOTE
- Patient with fever on 1/10. Likely nidus is L hand.   - 1/10 Blood Cx x2: MSSA   - 1/11 Blood Cx x1: Staph   -1/12 Blood Cx x2: NGTD    1/5 TTE: No vegetations.  1/12 TTE: No vegetations.  1/15 MARIYA: No Vegetations    - Abx: Ok to continue on Vancomycin IV (due to PCN allergy).   - Dose: Goal trough 15-20 for bacteremia. Currently, not at goal. Will need weekly Vanc trough, CBC, & CMP once therapeutic.  - Duration: MARIYA negtative. 2wks IV abx from 1/12/18 (1st negative blood cx). End date 1/26/18.

## 2018-01-17 LAB
ANION GAP SERPL CALC-SCNC: 9 MMOL/L
BACTERIA BLD CULT: NORMAL
BUN SERPL-MCNC: 15 MG/DL
CALCIUM SERPL-MCNC: 9 MG/DL
CHLORIDE SERPL-SCNC: 101 MMOL/L
CO2 SERPL-SCNC: 26 MMOL/L
CREAT SERPL-MCNC: 0.7 MG/DL
ERYTHROCYTE [DISTWIDTH] IN BLOOD BY AUTOMATED COUNT: 12.8 %
EST. GFR  (AFRICAN AMERICAN): >60 ML/MIN/1.73 M^2
EST. GFR  (NON AFRICAN AMERICAN): >60 ML/MIN/1.73 M^2
GLUCOSE SERPL-MCNC: 112 MG/DL
HCT VFR BLD AUTO: 34.5 %
HGB BLD-MCNC: 11.6 G/DL
MCH RBC QN AUTO: 29.2 PG
MCHC RBC AUTO-ENTMCNC: 33.6 G/DL
MCV RBC AUTO: 87 FL
PLATELET # BLD AUTO: 378 K/UL
PMV BLD AUTO: 9.7 FL
POTASSIUM SERPL-SCNC: 4.1 MMOL/L
RBC # BLD AUTO: 3.97 M/UL
SODIUM SERPL-SCNC: 136 MMOL/L
VANCOMYCIN TROUGH SERPL-MCNC: 15.1 UG/ML
WBC # BLD AUTO: 8.52 K/UL

## 2018-01-17 PROCEDURE — 97116 GAIT TRAINING THERAPY: CPT

## 2018-01-17 PROCEDURE — 92507 TX SP LANG VOICE COMM INDIV: CPT

## 2018-01-17 PROCEDURE — 63600175 PHARM REV CODE 636 W HCPCS: Performed by: HOSPITALIST

## 2018-01-17 PROCEDURE — 25000003 PHARM REV CODE 250: Performed by: HOSPITALIST

## 2018-01-17 PROCEDURE — 11000001 HC ACUTE MED/SURG PRIVATE ROOM

## 2018-01-17 PROCEDURE — 97535 SELF CARE MNGMENT TRAINING: CPT

## 2018-01-17 PROCEDURE — 97110 THERAPEUTIC EXERCISES: CPT

## 2018-01-17 PROCEDURE — 85027 COMPLETE CBC AUTOMATED: CPT

## 2018-01-17 PROCEDURE — 80048 BASIC METABOLIC PNL TOTAL CA: CPT

## 2018-01-17 PROCEDURE — A4216 STERILE WATER/SALINE, 10 ML: HCPCS | Performed by: HOSPITALIST

## 2018-01-17 PROCEDURE — 97530 THERAPEUTIC ACTIVITIES: CPT

## 2018-01-17 PROCEDURE — 80202 ASSAY OF VANCOMYCIN: CPT

## 2018-01-17 RX ORDER — TRAZODONE HYDROCHLORIDE 50 MG/1
50 TABLET ORAL NIGHTLY
Status: DISCONTINUED | OUTPATIENT
Start: 2018-01-17 | End: 2018-01-18 | Stop reason: HOSPADM

## 2018-01-17 RX ADMIN — VANCOMYCIN HYDROCHLORIDE 1250 MG: 10 INJECTION, POWDER, LYOPHILIZED, FOR SOLUTION INTRAVENOUS at 02:01

## 2018-01-17 RX ADMIN — ASPIRIN 81 MG: 81 TABLET, COATED ORAL at 08:01

## 2018-01-17 RX ADMIN — SODIUM CHLORIDE TAB 1 GM 1 G: 1 TAB at 08:01

## 2018-01-17 RX ADMIN — SODIUM CHLORIDE, PRESERVATIVE FREE 3 ML: 5 INJECTION INTRAVENOUS at 09:01

## 2018-01-17 RX ADMIN — ENOXAPARIN SODIUM 40 MG: 100 INJECTION SUBCUTANEOUS at 04:01

## 2018-01-17 RX ADMIN — SODIUM CHLORIDE, PRESERVATIVE FREE 3 ML: 5 INJECTION INTRAVENOUS at 02:01

## 2018-01-17 RX ADMIN — SODIUM CHLORIDE, PRESERVATIVE FREE 3 ML: 5 INJECTION INTRAVENOUS at 05:01

## 2018-01-17 RX ADMIN — VANCOMYCIN HYDROCHLORIDE 1250 MG: 10 INJECTION, POWDER, LYOPHILIZED, FOR SOLUTION INTRAVENOUS at 05:01

## 2018-01-17 RX ADMIN — TRAZODONE HYDROCHLORIDE 50 MG: 50 TABLET ORAL at 09:01

## 2018-01-17 RX ADMIN — SODIUM CHLORIDE TAB 1 GM 1 G: 1 TAB at 09:01

## 2018-01-17 RX ADMIN — THERA TABS 1 TABLET: TAB at 08:01

## 2018-01-17 RX ADMIN — METOPROLOL SUCCINATE 50 MG: 50 TABLET, EXTENDED RELEASE ORAL at 08:01

## 2018-01-17 RX ADMIN — VANCOMYCIN HYDROCHLORIDE 1250 MG: 10 INJECTION, POWDER, LYOPHILIZED, FOR SOLUTION INTRAVENOUS at 09:01

## 2018-01-17 NOTE — MEDICAL/APP STUDENT
Subjective:       Patient ID: Spenser Rabago is a 57 y.o. male.    Chief Complaint: Fall (pt. called 911 s/p fall. pt states he has been on floor unable to get up since approx. 830pm last night. pt. also states he fell tuesday and has rt. foot pain/swelling. pt. has scab to lt. cheek, abrasion with redness/swelling to rt. hand. )    HPI:  Mr. Rabago is a 58yo Male with a hx of HTN, MDD, and chronic headaches who was BIBA to the [facility] ED following a fall on 1/5/18.  Prior to the fall he reported a ~25lb box fell on his R foot. Since the injury, he been unable to move his R foot.  Following presentation to the ED, he was diagnosed with Rhabdomyolysis, since resolved with IVF.  O/E RLE was found. MRI revealed bilateral hyperintensity in the globus pallidus. The MRI and pt hx of possible environmental CO exposure from an indoor gas stove are consistent with CO poisoning. MetHb was unremarkable, though notably outside the timframe of detection. As such, hypobaric treatment was not approved.  Pt was hyponatremic on admission, which is being corrected.  On 1/10 pt developed a high fever, was positive for leukocytosis and procalcitonin.  Site of IV in L hand was erythematous and tender to palpation.  Doxycycline was initiated for suspected cellulitis. After blood cultures revealed Staph bacteremia, pt was switched to IV vancomycin.  Vancomycin trough is in the target range; PICC has been inserted; pt is now awaiting D/C to [place] re[facility] facility.      Subjective:  Mendy, the pt's nurse, reported no events of note overnight - pt urinated several times and made no BMs. Pt reports being comfortable, denies N/V/D, pain.      Review of Systems   Constitutional: Negative for chills, fatigue and fever.   Respiratory: Negative for cough and shortness of breath.    Cardiovascular: Negative for chest pain.   Gastrointestinal: Negative for abdominal pain, diarrhea, nausea and vomiting.   Skin: Negative for pallor.    Neurological: Negative for dizziness and headaches.       Objective:       Vitals:    01/16/18 1913 01/17/18 0027 01/17/18 0525 01/17/18 0744   BP: 130/78 125/80 (!) 143/86 129/80   BP Location: Right arm Left arm Left arm Left arm   Patient Position: Lying Lying Lying Lying   Pulse: 77 72 73 73   Resp: 16 18 18 18   Temp: 98.4 °F (36.9 °C) 98.5 °F (36.9 °C) 97.5 °F (36.4 °C) 98.8 °F (37.1 °C)   TempSrc: Oral Oral Oral Oral   SpO2:       Weight:       Height:         Meds:  IV Vancomycin 1.25g in NaCl 0.9% 250mL Q8  Metoprolol succinate 50mg PO 1x/day  Asprin 81mg PO 1x/day  Enoxaparin 40mg IM 1x/day  Acetaminophen 650mg PO PRN Q6  Ondansetron 8mg PO PRN Q8  Ramelteon 8mg PO PRN Nocte    Medication Comments documented by Susan Thorpe RN on 4/3/2016 at 2020.  Patient unable to get baclofen, wellbutrin, flexeril, adderall, and percocet refilled until his next appt. in 2 weeks    Labs:  Mild normocytic anemia (hb 11.6; MCV 87; Hct 34.5)  Thrombocytopenia  No longer hyponatremic  Glucose mildly elevated (112)  Leukocytes, BUN, creatinine, anion gap: unremarkable    Vanco trough 15.1 - acceptable range    Imaging:  -MRI: bilateral hyperintensity in globus pallidus, consistent with CO poisoning  -CXR AP: PICC line appropriately placed; negative for pleural effusion, pneumothorax  -Echo/MARIYA: both negative for vegetation or evidence of IE  -U/S L hand: no fluid collections      Physical Exam   Constitutional: He is oriented to person, place, and time.   Eyes: Conjunctivae and EOM are normal. Pupils are equal, round, and reactive to light.   Cardiovascular: Normal rate and regular rhythm.    Murmur heard.   Crescendo decrescendo systolic murmur is present with a grade of 4/6   Pulses:       Radial pulses are 2+ on the right side, and 2+ on the left side.   Murmur loudest over the aortic area   Pulmonary/Chest: Effort normal and breath sounds normal. No respiratory distress.   Musculoskeletal: Normal range of motion.         Right foot: There is no deformity.   Feet:   Right Foot:   Skin Integrity: Negative for ulcer, blister, skin breakdown or erythema.   Left Foot:   Skin Integrity: Negative for ulcer, blister, skin breakdown or erythema.   Neurological: He is alert and oriented to person, place, and time. A sensory deficit is present. No cranial nerve deficit. He exhibits abnormal muscle tone. GCS eye subscore is 4. GCS verbal subscore is 5. GCS motor subscore is 6.   Reflex Scores:       Bicep reflexes are 2+ on the right side and 2+ on the left side.       Brachioradialis reflexes are 2+ on the right side and 2+ on the left side.       Patellar reflexes are 2+ on the right side and 2+ on the left side.       Achilles reflexes are 0 on the right side and 1+ on the left side.  Power:   5/5 LE   5/5 R hip flexion/extension/abduction/adduction  3/5 R knee flexion/extension  0/5 R dorsiflexion/planterflexion    Sensation:  Light touch sensory deficit in L hand distally in fingers along C8 dermotome.  Light touch sensory deficit on R sided toes, dorsolateral foot, medial malleolus    Coordination:  UE and LE L>R    Propioception:  UE and LE L>R     Skin: Skin is warm.   Psychiatric: His speech is normal and behavior is normal. His mood appears not anxious. He does not exhibit a depressed mood.       Assessment:       Mr. Rabago is a 56yo Male with HTN, MDD, and chronic headaches who was BIBA following a fall.  He has RLE weakness, likely due to direct trauma from heavy (~25lb)  box falling on his R foot. Elevated CPK and transaminitis indicated rhabdomyolisis upon arrival to ED, from which he has recovered following IVF infusion. His hx and MRI findings are highly suggestive of CO poisoning, though he was unable to receive hypobaric txtmt as his MetHb was not elevated.  Blood cultures and signs and symptoms indicate a Staph bacteremia acquired in hospital, for which he is being treated with IV vancomycin. Having reached acceptable  vancomycin troughs, he is medically fit for D/C.  Plan:       ***    1. Staph bactermia  -Repeat cultures confirmed bacteremia, ID has consulted  -Echo, MARIYA indicate pt negative for IE  -U/S indicate no fluid around IV site/area of cellulitis  -IV Vancomycin 1.25g in NaCl 0.9% 250mL Q8    2. R leg weakness  -PT/OT have consulted  -Inpatient rehab at [facility] following D/C    3. HTN  -Pt will continue on Metoprolol succinate 50mg PO 1x/day  -Lisinopril discontinued b/c adequate HTN control with Metoprolol    4. Depression  -Pt will continue on Bupropion, Trazodone when returns home    5. Chronic headaches  -Pt will continue on Alprazilam, Baclofen when returns home

## 2018-01-17 NOTE — PLAN OF CARE
Problem: SLP Goal  Goal: SLP Goal  Short Term Goals:  1. Patient will successfully participate in llcoab-[id number]-[id number] evaluation to further assess for any communication impairments.  2. Pt will answer y/n complex questions with 90%.  3. Pt will recall events of the day with min GENOVEVA using compensatory memory strategies.  4. Pt will complete 3-4 word mental manipulation tasks with 80%.   5. Pt will complete category exclusion tasks with 80%.   6. Pt will complete time/money reasoning tasks with 80%.   7. Pt will state 3 physical/cognitive changes impacting safety/independence with ADLS with min GENOVEVA.      Outcome: Ongoing (interventions implemented as appropriate)  Pt with significant improvement noted overall and reports he is feeling better.

## 2018-01-17 NOTE — PLAN OF CARE
[facility] Infectious Disease Plan of Care Note    Vancomycin trough: 15.1, in therapeutic range today.   OK to discharge from ID stand point with Vancomycin IV (goal trough 15-20 for bacteremia) for a total of 2wks.    Sekou Carrasco  [facility] Internal [facility]

## 2018-01-17 NOTE — PT/OT/SLP PROGRESS
"Physical Therapy Treatment    Patient Name:  Spenser Rabago   MRN:  [id number]    Recommendations:     Discharge Recommendations:  rehabilitation facility   Discharge Equipment Recommendations:  (defer to IPR)   Barriers to discharge: Inaccessible home and Decreased caregiver support    Assessment:     Spenser Rabago is a 57 y.o. male admitted with a medical diagnosis of Right leg weakness.  He presents with the following impairments/functional limitations:  weakness, impaired endurance, impaired self care skills, impaired functional mobilty, gait instability, impaired balance, decreased ROM, pain, decreased lower extremity function. Pt with improved activity tolerance and was able to ambulate 55 ft with RW and CGA 2x with seated rest break between bouts. Pt participates well in therapy. Pt was independent and living alone PTA and will benefit from IPR for continued therapy to return to independence.    Rehab Prognosis:  Good; patient would benefit from acute skilled PT services to address these deficits and reach maximum level of function.      Recent Surgery: Procedure(s) (LRB):  MARIYA During Cath Case (N/A) 2 Days Post-Op    Plan:     During this hospitalization, patient to be seen 6 x/week to address the above listed problems via gait training, therapeutic activities, therapeutic exercises  · Plan of Care Expires:  02/04/18   Plan of Care Reviewed with: patient    Subjective     Communicated with TERI Prasad prior to session.  Patient found supine with HOB slightly elevated upon PT entry to room, agreeable to treatment.      Chief Complaint: feeling tired  Patient comments/goals: "I didn't sleep at all last night"  Pain/Comfort:  · Pain Rating 1: 6/10  · Location - Side 1: Right  · Location - Orientation 1: generalized  · Location 1: foot  · Pain Addressed 1: Reposition, Distraction  · Pain Rating Post-Intervention 1: 6/10    Patients cultural, spiritual, Buddhist conflicts given the current situation: " n/a    Objective:     Patient found with: bed alarm     General Precautions: Standard, fall   Orthopedic Precautions:N/A   Braces: N/A     Functional Mobility:  · Bed Mobility:     · Scooting: modified independence  · Supine to Sit: modified independence  · Sit to Supine: modified independence  · Transfers:     · Sit to Stand:  stand by assistance with rolling walker  · Gait: 55 ft with RW and CGA x 2. Pt with R foot drop and R foot dragging during swing phase, cues for increased R hip flexion to clear RLE    AM-PAC 6 CLICK MOBILITY  Turning over in bed (including adjusting bedclothes, sheets and blankets)?: 4  Sitting down on and standing up from a chair with arms (e.g., wheelchair, bedside commode, etc.): 3  Moving from lying on back to sitting on the side of the bed?: 4  Moving to and from a bed to a chair (including a wheelchair)?: 3  Need to walk in hospital room?: 3  Climbing 3-5 steps with a railing?: 3  Total Score: 20     Therapeutic Activities and Exercises:  Pt instructed in 2 bouts of gait as reported above with prolonged rest break and seated exercises completed between bouts. Seated exercises included BLE APs, LAQs, hip flexion, and iso hip adduction x 10 reps each LE. Pt with trace R tib anterior activation, RLE LAQs through 1/2 ROM, and LLE LAQs until ~10 deg from neutral. Pt with reports of pain in R upper calf during R LAQs. Pt returned to supine and PT provided passive stretch to R gastroc/heel cord 2 x 45 sec hold. Pt instructed in BLE supine exercises x 10 reps: heel slides and hip abduction slides. Pt educated to complete BLE exercises in supine throughout the days within tolerance.    Patient left HOB elevated with call button in reach and bed alarm on..    GOALS:    Physical Therapy Goals        Problem: Physical Therapy Goal    Goal Priority Disciplines Outcome Goal Variances Interventions   Physical Therapy Goal     PT/OT, PT Ongoing (interventions implemented as appropriate)      Description:  Goals to be met by: 18     Patient will increase functional independence with mobility by performin. Supine to sit with Stand-by Assistance MET 2018  2. Sit to supine with Stand-by Assistance MET 2018  3. Sit to stand transfer with Moderate Assistance MET 2018  4. Bed to chair transfer with Moderate Assistance using least restirctive AD MET 2018  5.  Assess gait     Updated Goals:  1. Supine<>sit with SBA-MET 18  2. Sit to stand to SBA with RW-MET 18  3. Bed to chair transfers with RW and SBA  4. Gait x 50 ft with RW and SBA                         Time Tracking:     PT Received On: 18  PT Start Time: 1152     PT Stop Time: 1216  PT Total Time (min): 24 min     Billable Minutes: Gait Training 14 and Therapeutic Exercise 10    Treatment Type: Treatment  PT/PTA: PT     PTA Visit Number: 0     Gracy Faith, PT  2018

## 2018-01-17 NOTE — PROGRESS NOTES
The Sw faxed updated info to Noon at [facility] via [facility] and [email or web address] by routing. The Sw left her a message also informing her the pt's ready for d/c. The pt will not be able to d/c until she receives the auth from [facility].

## 2018-01-17 NOTE — PLAN OF CARE
Problem: Physical Therapy Goal  Goal: Physical Therapy Goal  Goals to be met by: 18     Patient will increase functional independence with mobility by performin. Supine to sit with Stand-by Assistance MET 2018  2. Sit to supine with Stand-by Assistance MET 2018  3. Sit to stand transfer with Moderate Assistance MET 2018  4. Bed to chair transfer with Moderate Assistance using least restirctive AD MET 2018  5.  Assess gait     Updated Goals:  1. Supine<>sit with SBA-MET 18  2. Sit to stand to SBA with RW-MET 18  3. Bed to chair transfers with RW and SBA  4. Gait x 50 ft with RW and SBA       Outcome: Ongoing (interventions implemented as appropriate)  Pt ambulated 55 ft with RW and CGA and completed supine<>sit mod I from elevated bed. Recommending IPR upon d/c.

## 2018-01-18 VITALS
RESPIRATION RATE: 19 BRPM | TEMPERATURE: 98 F | BODY MASS INDEX: 28.19 KG/M2 | SYSTOLIC BLOOD PRESSURE: 119 MMHG | DIASTOLIC BLOOD PRESSURE: 79 MMHG | HEART RATE: 70 BPM | WEIGHT: 165.13 LBS | OXYGEN SATURATION: 99 % | HEIGHT: 64 IN

## 2018-01-18 LAB
BACTERIA BLD CULT: NORMAL
BACTERIA BLD CULT: NORMAL

## 2018-01-18 PROCEDURE — 63600175 PHARM REV CODE 636 W HCPCS: Performed by: HOSPITALIST

## 2018-01-18 PROCEDURE — A4216 STERILE WATER/SALINE, 10 ML: HCPCS | Performed by: HOSPITALIST

## 2018-01-18 PROCEDURE — 25000003 PHARM REV CODE 250: Performed by: HOSPITALIST

## 2018-01-18 PROCEDURE — 97116 GAIT TRAINING THERAPY: CPT

## 2018-01-18 PROCEDURE — 97110 THERAPEUTIC EXERCISES: CPT

## 2018-01-18 RX ORDER — TRAZODONE HYDROCHLORIDE 50 MG/1
50 TABLET ORAL NIGHTLY
Start: 2018-01-18 | End: 2018-09-21

## 2018-01-18 RX ADMIN — SODIUM CHLORIDE, PRESERVATIVE FREE 3 ML: 5 INJECTION INTRAVENOUS at 05:01

## 2018-01-18 RX ADMIN — SODIUM CHLORIDE TAB 1 GM 1 G: 1 TAB at 09:01

## 2018-01-18 RX ADMIN — VANCOMYCIN HYDROCHLORIDE 1250 MG: 10 INJECTION, POWDER, LYOPHILIZED, FOR SOLUTION INTRAVENOUS at 05:01

## 2018-01-18 RX ADMIN — ASPIRIN 81 MG: 81 TABLET, COATED ORAL at 09:01

## 2018-01-18 RX ADMIN — THERA TABS 1 TABLET: TAB at 09:01

## 2018-01-18 RX ADMIN — METOPROLOL SUCCINATE 50 MG: 50 TABLET, EXTENDED RELEASE ORAL at 09:01

## 2018-01-18 NOTE — PT/OT/SLP DISCHARGE
Physical Therapy Discharge Summary    Name: Spenser Rabago  MRN: [id number]   Principal Problem: Right leg weakness     Patient Discharged from acute Physical Therapy on 18.  Please refer to prior PT noted date on 18 for functional status.     Assessment:     Patient appropriate for care in another setting.    Objective:     GOALS:    Physical Therapy Goals        Problem: Physical Therapy Goal    Goal Priority Disciplines Outcome Goal Variances Interventions   Physical Therapy Goal     PT/OT, PT Ongoing (interventions implemented as appropriate)     Description:  Goals to be met by: 18     Patient will increase functional independence with mobility by performin. Supine to sit with Stand-by Assistance MET 2018  2. Sit to supine with Stand-by Assistance MET 2018  3. Sit to stand transfer with Moderate Assistance MET 2018  4. Bed to chair transfer with Moderate Assistance using least restirctive AD MET 2018  5.  Assess gait     Updated Goals:  1. Supine<>sit with SBA-MET 18  2. Sit to stand to SBA with RW-MET 18  3. Bed to chair transfers with RW and SBA MET 2018  4. Gait x 50 ft with RW and SBA  5. Bed to chair transfers with RW and mod I                          Reasons for Discontinuation of Therapy Services  Transfer to alternate level of care.      Plan:     Patient Discharged to: Inpatient Rehab.    Gracy Faith, PT  2018

## 2018-01-18 NOTE — ASSESSMENT & PLAN NOTE
Currently has alprazolam, baclofen, Percocet, Opana ER, tizanadine, and topiramate on his med list in our system. Not complaining of any headaches or other pain at this time, so will hold these medications.

## 2018-01-18 NOTE — PT/OT/SLP PROGRESS
"Physical Therapy Treatment    Patient Name:  Spenser Rabago   MRN:  [id number]    Recommendations:     Discharge Recommendations:  rehabilitation facility   Discharge Equipment Recommendations:  (defer to IPR)   Barriers to discharge: Inaccessible home and Decreased caregiver support    Assessment:     Spenser Rabago is a 57 y.o. male admitted with a medical diagnosis of Right leg weakness.  He presents with the following impairments/functional limitations:  weakness, impaired endurance, impaired sensation, impaired self care skills, impaired functional mobilty, gait instability, impaired balance, decreased lower extremity function, decreased ROM. Pt with improved activity tolerance today, ambulating increased gait distance up to 80 ft with RW and CGA/SBA. Pt continues to present with R anterior tibialis activation for trace DF but with foot drop during gait. Pt was living alone independently prior to admission, recommending IPR upon d/c.    Rehab Prognosis:  Good; patient would benefit from acute skilled PT services to address these deficits and reach maximum level of function.      Recent Surgery: Procedure(s) (LRB):  MARIYA During Cath Case (N/A) 3 Days Post-Op    Plan:     During this hospitalization, patient to be seen 6 x/week to address the above listed problems via gait training, therapeutic activities, therapeutic exercises  · Plan of Care Expires:  02/04/18   Plan of Care Reviewed with: patient    Subjective     Communicated with TERI Tobias prior to session.  Patient found supine with HOB elevated upon PT entry to room, agreeable to treatment.      Chief Complaint: no complaints  Patient comments/goals: "can you fix my sheets"  Pain/Comfort:  · Pain Rating 1: 0/10  · Pain Rating Post-Intervention 1: 0/10    Patients cultural, spiritual, Faith conflicts given the current situation: n/a    Objective:     Patient found with: bed alarm     General Precautions: Standard, fall   Orthopedic Precautions:N/A "   Braces: N/A     Functional Mobility:  · Bed Mobility:     · Rolling Right: modified independence  · Scooting: modified independence  · Supine to Sit: modified independence  · Sit to Supine: modified independence  · Transfers:     · Sit to Stand:  stand by assistance with rolling walker  · Bed to Chair: stand by assistance with  rolling walker  using  Step Transfer  · Gait: 80 ft and 60 ft with RW and SBA/CGA with improved stability. Cues for safe turning technique with RW.      AM-PAC 6 CLICK MOBILITY  Turning over in bed (including adjusting bedclothes, sheets and blankets)?: 4  Sitting down on and standing up from a chair with arms (e.g., wheelchair, bedside commode, etc.): 3  Moving from lying on back to sitting on the side of the bed?: 4  Moving to and from a bed to a chair (including a wheelchair)?: 3  Need to walk in hospital room?: 3  Climbing 3-5 steps with a railing?: 3  Total Score: 20       Therapeutic Activities and Exercises:  Pt instructed in gait training as reported above with improved stability but continued foot drop on RLE. Cues provided to prevent instability and LOB during turning as pt trying to step backwards with RW to turn but provided cues for continued forward stepping and pt with improved stability. Pt instructed in standing LE exercises with BUE support on RW x 8 reps: march in place, hip flexion with knee extended, hip abduction, and knee flexion. Pt required CGA up to min A during exercises 2/2 instability during R single leg stance leading to pt leaning posteriorly and to the right.   Pt required 2 seated rest breaks during exercises. Following exercises pt instructed in 2nd bout of gait then returned to supine with HOB elevated.    Patient left HOB elevated with call button in reach, bed alarm on and RN notified..    GOALS:    Physical Therapy Goals        Problem: Physical Therapy Goal    Goal Priority Disciplines Outcome Goal Variances Interventions   Physical Therapy Goal      PT/OT, PT Ongoing (interventions implemented as appropriate)     Description:  Goals to be met by: 18     Patient will increase functional independence with mobility by performin. Supine to sit with Stand-by Assistance MET 2018  2. Sit to supine with Stand-by Assistance MET 2018  3. Sit to stand transfer with Moderate Assistance MET 2018  4. Bed to chair transfer with Moderate Assistance using least restirctive AD MET 2018  5.  Assess gait     Updated Goals:  1. Supine<>sit with SBA-MET 18  2. Sit to stand to SBA with RW-MET 18  3. Bed to chair transfers with RW and SBA MET 2018  4. Gait x 50 ft with RW and SBA  5. Bed to chair transfers with RW and mod I                          Time Tracking:     PT Received On: 18  PT Start Time: 0930     PT Stop Time: 0953  PT Total Time (min): 23 min     Billable Minutes: Gait Training 13 and Therapeutic Exercise 10    Treatment Type: Treatment  PT/PTA: PT     PTA Visit Number: 0     Gracy Faith, PT  2018

## 2018-01-18 NOTE — PLAN OF CARE
Report given to KENNETH Leija at [facility].  Nurse verbalized understanding.  PICC line in place upon discharge.  Awaiting transportation via [facility] to take pt to [facility].

## 2018-01-18 NOTE — PLAN OF CARE
Problem: Patient Care Overview  Goal: Plan of Care Review  Outcome: Ongoing (interventions implemented as appropriate)  Plan of care reviewed with patient. Patient verbalized complete understanding. Fall/safety precautions maintained. Slip resistant socks on. Bed in lowest position, locked, call light within reach. Bed alarm on, Side rails up x's 2. Nurse instructed patient to notify staff for any assistance and pt verbalized complete understanding. Pt turned Q2h to prevent skin breakdown. PROM performed with pt. Pt received IV antibiotics during shift. PICC dressing CDI. No acute distress noted, will continue to monitor. Pt not on telemetry.

## 2018-01-18 NOTE — PLAN OF CARE
01/18/18 1143   Final Note   Assessment Type Final Discharge Note   Discharge Disposition Rehab   Right Care Referral Info   Post Acute Recommendation IRF   Referral Type [facility]   [facility]

## 2018-01-18 NOTE — PT/OT/SLP DISCHARGE
Occupational Therapy Discharge Summary    Spenser Rabago  MRN: [id number]   Principal Problem: Right leg weakness      Patient Discharged from acute Occupational Therapy on 1/18.  Please refer to prior OT note dated 1/17 for functional status.    Assessment:      Patient appropriate for care in another setting.    Objective:     GOALS:    Occupational Therapy Goals        Problem: Occupational Therapy Goal    Goal Priority Disciplines Outcome Interventions   Occupational Therapy Goal     OT, PT/OT Ongoing (interventions implemented as appropriate)    Description:  Goals to be met by: 2/5     Patient will increase functional independence with ADLs by performing:    UE Dressing with Set-up Assistance.  Grooming while standing at sink with Moderate Assistance. --MET 1/16/18  Toileting from toilet with Moderate Assistance for hygiene and clothing management.   Supine to sit with Stand-by Assistance. --MET 1/15/18  Toilet transfer to toilet with Moderate Assistance.  Increased functional strength to 4/5 for BUE.                        Reasons for Discontinuation of Therapy Services  Transfer to alternate level of care.      Plan:     Patient Discharged to: Inpatient Rehab    Armond Barger OT  1/18/2018

## 2018-01-18 NOTE — PLAN OF CARE
Problem: Physical Therapy Goal  Goal: Physical Therapy Goal  Goals to be met by: 18     Patient will increase functional independence with mobility by performin. Supine to sit with Stand-by Assistance MET 2018  2. Sit to supine with Stand-by Assistance MET 2018  3. Sit to stand transfer with Moderate Assistance MET 2018  4. Bed to chair transfer with Moderate Assistance using least restirctive AD MET 2018  5.  Assess gait     Updated Goals:  1. Supine<>sit with SBA-MET 18  2. Sit to stand to SBA with RW-MET 18  3. Bed to chair transfers with RW and SBA MET 2018  4. Gait x 50 ft with RW and SBA  5. Bed to chair transfers with RW and mod I        Outcome: Ongoing (interventions implemented as appropriate)  Pt with improved activity tolerance today, ambulating up to 80 ft with RW and SBA/CGA with improved stability with cues for turning with Rw. Pt completed transfer from bed to chair with RW and SBA. Recommending IPR placement upon d/c.

## 2018-01-18 NOTE — PROGRESS NOTES
The Sw spoke to Noon who states she already spoke to the [pt's nurse and gave her the number to call report. The pt's going to room 1703. The Sw faxed the d/c orders to Noon via [facility]. Noon states she need the pt there by 2pm. The Sw will have to arrange transport for the pt. The called [facility] w/c vinod([phone])spoke to Andreia and she stated she can get a  there for 1pm but if it's before she will call to inform the Sw so she can alert the nurse. The Sw informed the pt's nurse of this info listed above. The Sw informed the pt's friend Susan([phone])of this info and she's in agreement with  ed/c plan today for [facility]. She will call the pt's brother Mando and inform him. The Sw informed the pt's nurse of the above mentioned info.

## 2018-01-18 NOTE — PLAN OF CARE
Patient signed choice form for [place] left packet with TERI Guthrie who is expecting transport to arrive at approximately 1pm.  Follow-up With  Details  Why  Contact Info   Vladimir Apple MD  Go to  after inpatient rehab stay  [address]  [address]  [place] [address]  [phone]   [facility]    [facility]     Dr. Franki Lancaster  On 2/2/2018  at 3:40 pm--Neurology Follow-Up  [address]  [address]  [place], [place] [id number] [phone]   Hoa Chung MD  Schedule an appointment as soon as possible for a visit  Psychiatric Doctor  [facility] [address]  [phone]             01/18/18 1242   Final Note   Assessment Type Final Discharge Note   Discharge Disposition Rehab   Hospital Follow Up  Appt(s) scheduled? Yes   Right Care Referral Info   Post Acute Recommendation IRF

## 2018-01-18 NOTE — PROGRESS NOTES
[facility] Medicine  Progress Note    Patient Name: Spenser Rabago  MRN: [id number]  Patient Class: IP- Inpatient   Admission Date: 1/5/2018  Length of Stay: 12 days  Attending Physician: Manjit Farnsworth MD  Primary Care Provider: Vladimir Apple MD        Subjective:     Principal Problem:Right leg weakness    HPI:  Spenser Rabago is a 57 y.o. white man with hypertension, migraine headaches, attention deficit hyperactivity disorder, and depression.  He lives in [place], [place].  His primary care physician is Dr. Vladimir Apple.  His neurologist is Dr. Frank Lancaster.   He was taken by EMS to [facility] on 1/5/18 after falling and being on the floor for at least 6 hours.  Four days prior to this, a box fell on his right foot and he had pain and difficulty walking.  He was falling a lot after that.  He also had decreased appetite and oral intake the past week.  He was found to have dehydration (sodium 127, BUN 47, urine with ketones and hyaline casts), high anion gap acidosis (bicarbonate 18, anion gap 20), and rhabdomyolysis (CPK 1759).  He was admitted to [facility].    Hospital Course:  Rhabdomyolysis resolved with IV fluids.  He was found to have right greater than left lower extremity weakness.  MRI showed bilateral hyperintensity in the globus pallidi and throughout the supratentorial white matter.  Neurology was concerned for carbon monoxide exposure/toxicity.  His carboxyhemoglobin was within normal limits.  Neurology recommended hyperbaric oxygen therapy, but director of hyperbarics Dr. Alaina Kuo said that was only indicated in acute CO poisoning.  His family went to his house and found black mold in the vents.  Inpatient rehab was sought and he waited around for a few days.  Lisinopril was stopped because his blood pressures were controlled on metoprolol alone.  Alprazolam was stopped because he no longer had anxiety likely due  to the carbon monoxide brain damage.              He was set up for discharge to inpatient rehab, but on 1/10/18, he suddenly developed new fever.  Urinalysis showed no evidence for UTI.  Chest x-ray showed no pneumonia.  Influenza swab was negative.  CBC showed new leukocytosis.  Procalcitonin was elevated.  He denied any specific symptoms except for mild cough.  His left hand distal to a peripheral IV was swollen, with mild redness and tenderness at the IV site.  Doxycycline was started for suspected cellulitis.  Peripheral IVs were removed.  The blood cultures grew methicillin-sensitive Staphylococcus aureus so a new peripheral IV was placed, doxycycline was changed to IV vancomycin, and Infectious Disease was consulted.  Leukocytosis resolved after initiating antibiotics.  Infectious Disease noted the hand cellulitis, but Mr. Rabago told them that it had been there since admission.  They recommended repeating the echocardiogram, after hearing a systolic murmur.  Transthoracic echocardiogram showed no evidence of vegetation.  Tissue ultrasound of the left hand was unremarkable.  Infectious Disease recommended transesophageal echocardiogram to evaluate for endocarditis, and there was no vegetation seen.  A right arm PICC was placed.  He will complete vancomycin on 1/26/18 with a goal vancomycin trough of 15-20.    Interval History: Doing okay today. Denies any significant pain. Working well with therapy.    Review of Systems   Constitutional: Negative for chills and fever.   Respiratory: Negative for cough and shortness of breath.    Cardiovascular: Negative for chest pain and palpitations.   Gastrointestinal: Negative for nausea and vomiting.     Objective:     Vital Signs (Most Recent):  Temp: 98.4 °F (36.9 °C) (01/17/18 1112)  Pulse: 67 (01/17/18 1112)  Resp: 18 (01/17/18 1112)  BP: 128/80 (01/17/18 1112)  SpO2: 100 % (01/15/18 1630) Vital Signs (24h Range):  Temp:  [97.5 °F (36.4 °C)-98.8 °F (37.1 °C)] 98.4 °F  (36.9 °C)  Pulse:  [67-77] 67  Resp:  [16-18] 18  BP: (125-143)/(78-86) 128/80     Weight: 74.9 kg (165 lb 2 oz)  Body mass index is 28.34 kg/m².    Intake/Output Summary (Last 24 hours) at 01/17/18 1852  Last data filed at 01/17/18 1634   Gross per 24 hour   Intake              250 ml   Output             2350 ml   Net            -2100 ml      Physical Exam   Constitutional: He is oriented to person, place, and time. He appears well-developed and well-nourished. No distress.   Neurological: He is alert and oriented to person, place, and time. He displays atrophy.   Decreased strength in R>L LE   Psychiatric: He has a normal mood and affect. His behavior is normal.   Nursing note and vitals reviewed.      Significant Labs:   CBC:   Recent Labs  Lab 01/16/18  0124 01/17/18  0500   WBC 7.90 8.52   HGB 11.7* 11.6*   HCT 34.7* 34.5*   * 378*     CMP:   Recent Labs  Lab 01/16/18  0124 01/17/18  0500   * 136   K 4.3 4.1    101   CO2 24 26    112*   BUN 20 15   CREATININE 0.7 0.7   CALCIUM 8.8 9.0   ANIONGAP 9 9   EGFRNONAA >60 >60       Significant Imaging: I have reviewed all pertinent imaging results/findings within the past 24 hours.    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  Appreciate Neurology.  Hyperbaric oxygen therapy not indicated per Dr. Kuo. Awaiting inpatient rehab placement.         Bacteremia due to Staphylococcus aureus    Probably caused by abrasions from his fall. Appreciate ID and Cardiology.  MARIYA showed no vegetation.  Vancomycin until 1/26/18. Level this AM is in therapeutic range of 15 to 20.         Attention deficit hyperactivity disorder (ADHD)    Holding Adderall for now.         Essential hypertension    Resumed home Toprol XL.  Holding lisinopril.  Monitor. SBP ranged 124 to 143.         Depression    Will resume trazodone 50 mg nightly. Holding Wellbutrin for now.          Mixed migraine and muscle contraction headache    Currently has alprazolam, baclofen, Percocet, Opana ER, tizanadine, and topiramate on his med list in our system. Not complaining of any headaches or other pain at this time, so will hold these medications.           VTE Risk Mitigation         Ordered     enoxaparin injection 40 mg  Daily     Route:  Subcutaneous        01/05/18 1000     High Risk of VTE  Once      01/05/18 1000     Reason for No Pharmacological VTE Prophylaxis  Once      01/05/18 1000     Place sequential compression device  Until discontinued      01/05/18 1000              Manjit Farnsworth MD  Department of Hospital Medicine   [facility]

## 2018-01-18 NOTE — PLAN OF CARE
Problem: Patient Care Overview  Goal: Plan of Care Review  Outcome: Ongoing (interventions implemented as appropriate)  Plan of care reviewed with patient. Plan is for patient to go to SNF tomorrow. Right upper arm PICC line clean, dry, and intact. IV antibiotics administered for infection. Bed alarm on, bed locked and low, side rails up x2, and call bell in reach. Patient verbalizes clear understanding of plan of care.

## 2018-01-18 NOTE — ASSESSMENT & PLAN NOTE
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  Appreciate Neurology.  Hyperbaric oxygen therapy not indicated per Dr. Kuo. Awaiting inpatient rehab placement.

## 2018-01-18 NOTE — MEDICAL/APP STUDENT
Subjective:       Patient ID: Spenser Rabago is a 57 y.o. male.    Chief Complaint: Fall (pt. called 911 s/p fall. pt states he has been on floor unable to get up since approx. 830pm last night. pt. also states he fell tuesday and has rt. foot pain/swelling. pt. has scab to lt. cheek, abrasion with redness/swelling to rt. hand. )      HPI:  Mr. Rabago is a 58yo Male with a hx of HTN, MDD, and chronic headaches who was BIBA to the [facility] ED following a fall on 1/5/18.  In the week leading up to his fall he had a decreased appetite and oral intake.  Prior to the fall he reported a ~25lb box fell on his R foot. Since the injury, he been unable to move his R foot. At the time of the fall, pt had the gas stove on to heat his home (with no ventilation).    Hospital Course:  Following presentation to the ED, he was diagnosed with Rhabdomyolysis, since resolved with IVF.  O/E RLE weakness was found. MRI revealed bilateral hyperintensity in the globus pallidus. The MRI and pt hx of possible environmental CO exposure from an indoor gas stove are consistent with CO poisoning. MetHb was unremarkable, though notably outside the timframe of detection. As such, hypobaric treatment was not approved.  Pt was hyponatremic on admission, which is being corrected.  On 1/10 pt developed a high fever, was positive for leukocytosis and procalcitonin.  Site of IV in L hand was erythematous and tender to palpation.  Doxycycline was initiated for suspected cellulitis. After blood cultures revealed Staph bacteremia, pt was switched to IV vancomycin and ID was consulted.  On 1/17 Vancomycin trough reached the target therepeutic range; PICC has been inserted; pt is now awaiting D/C to [facility] rehab facility.      Interval History:  Pt reports feeling better. No complaints overnight. Denies N/V/D, fever, chills. Still unable to move RLE, despite phys therapy. Hopeful that phys therapy at [facility] will return full function to  RLE.         Review of Systems   Constitutional: Negative for activity change, chills, fatigue and fever.   Respiratory: Negative.    Cardiovascular: Negative.    Gastrointestinal: Negative.    Neurological: Positive for weakness. Negative for light-headedness and headaches.   Psychiatric/Behavioral: Negative.        Objective:      Physical Exam   Constitutional: He is oriented to person, place, and time.   Cardiovascular: Normal rate and regular rhythm.    Pulmonary/Chest: Effort normal.   Neurological: He is alert and oriented to person, place, and time.   RLE:  Power 2/5  No change from yesterday   Skin: Skin is warm and dry.   Psychiatric: He has a normal mood and affect. His speech is normal and behavior is normal.       No recent labs/imaging    Assessment:   Mr. Rabago is a 58yo Male with HTN, MDD, and chronic headaches who was BIBA following a fall.  He has RLE weakness, likely due to direct trauma from heavy (~25lb)  box falling on his R foot. Elevated CPK and transaminitis indicated rhabdomyolisis upon arrival to ED.  Head/neck MRI findings and hx are highly suggestive of CO poisoning. Blood cultures and signs/symptoms indicate a Staph bacteremia acquired in hospital. Having reached acceptable vancomycin troughs, he is medically fit for D/C.    Plan:       ***      1. R leg weakness  -PT/OT have consulted  -Inpatient rehab at [facility] following D/C    2. Staph bactermia  -Repeat cultures confirmed bacteremia, ID has consulted  -Echo, MARIYA indicate pt negative for IE  -U/S indicate no fluid around IV site/area of cellulitis  -IV Vancomycin 1.25g in NaCl 0.9% 250mL Q8 until 1/29    3. CO poisoning  -Unable to receive hypobaric txtmt as MetHb was not elevated (possiblly outside timeframe of detection)  -Pt or relative should check CO levels in home before returning  -Pt should FU with PCP and/or Neurology for concerns about effects of CO exposure    4. HTN  -Pt will continue on Metoprolol succinate 50mg PO  1x/day  -Lisinopril discontinued b/c adequate HTN control with Metoprolol     5. Depression  -Pt will continue on Bupropion, Trazodone when returns home     6. Chronic headaches  -Pt will continue on Alprazilam, Baclofen when returns home    7. Rhabdomyolysis  -Pt recovered following IVF infusion

## 2018-01-18 NOTE — ASSESSMENT & PLAN NOTE
Probably caused by abrasions from his fall. Appreciate ID and Cardiology.  AMRIYA showed no vegetation.  Vancomycin until 1/26/18. Level this AM is in therapeutic range of 15 to 20.

## 2018-01-18 NOTE — PROGRESS NOTES
The Eduard spoke to Noon at [facility] and she states she received the updated notes the Sw sent on the pt yesterday. She states they were not able to d/c any pt's yesterday due to the weather. She states the  hasn't arrived yet but she will speak with him when he does to see if they will be able to discharge pt's today. She will call the Sw back. The Sw informed her the pt's ready for d/c.

## 2018-01-22 NOTE — ASSESSMENT & PLAN NOTE
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  Appreciate Neurology.  Hyperbaric oxygen therapy not indicated per Dr. Kuo. Inpatient rehab placement.

## 2018-01-22 NOTE — DISCHARGE SUMMARY
[facility]  Discharge Summary      Patient Name: Spenser Rabago  MRN: [id number]  Admission Date: 1/5/2018  Hospital Length of Stay: 13 days  Discharge Date and Time: 1/18/2018 12:43 PM  Attending Physician: Manjit Farnsworth MD   Discharging Provider: Manjit Farnsworth MD  Primary Care Provider: Vladimir Apple MD      HPI:   Spenser Rabago is a 57 y.o. white man with hypertension, migraine headaches, attention deficit hyperactivity disorder, and depression.  He lives in [place], [place].  His primary care physician is Dr. Vladimir Apple.  His neurologist is Dr. Frank Lancaster.   He was taken by EMS to [facility] on 1/5/18 after falling and being on the floor for at least 6 hours.  Four days prior to this, a box fell on his right foot and he had pain and difficulty walking.  He was falling a lot after that.  He also had decreased appetite and oral intake the past week.  He was found to have dehydration (sodium 127, BUN 47, urine with ketones and hyaline casts), high anion gap acidosis (bicarbonate 18, anion gap 20), and rhabdomyolysis (CPK 1759).  He was admitted to [facility].    Procedure(s) (LRB):  MARIYA During Cath Case (N/A)      Hospital Course:   Rhabdomyolysis resolved with IV fluids.  He was found to have right greater than left lower extremity weakness.  MRI showed bilateral hyperintensity in the globus pallidi and throughout the supratentorial white matter.  Neurology was concerned for carbon monoxide exposure/toxicity.  His carboxyhemoglobin was within normal limits.  Neurology recommended hyperbaric oxygen therapy, but director of hyperbarics Dr. Alaina Kuo said that was only indicated in acute CO poisoning.  His family went to his house and found black mold in the vents.  Inpatient rehab was sought and he waited around for a few days.  Lisinopril was stopped because his blood pressures were controlled on metoprolol  alone.  Alprazolam was stopped because he no longer had anxiety likely due to the carbon monoxide brain damage.              He was set up for discharge to inpatient rehab, but on 1/10/18, he suddenly developed new fever.  Urinalysis showed no evidence for UTI.  Chest x-ray showed no pneumonia.  Influenza swab was negative.  CBC showed new leukocytosis.  Procalcitonin was elevated.  He denied any specific symptoms except for mild cough.  His left hand distal to a peripheral IV was swollen, with mild redness and tenderness at the IV site.  Doxycycline was started for suspected cellulitis.  Peripheral IVs were removed.  The blood cultures grew methicillin-sensitive Staphylococcus aureus so a new peripheral IV was placed, doxycycline was changed to IV vancomycin, and Infectious Disease was consulted.  Leukocytosis resolved after initiating antibiotics.  Infectious Disease noted the hand cellulitis, but Mr. Rabago told them that it had been there since admission.  They recommended repeating the echocardiogram, after hearing a systolic murmur.  Transthoracic echocardiogram showed no evidence of vegetation.  Tissue ultrasound of the left hand was unremarkable.  Infectious Disease recommended transesophageal echocardiogram to evaluate for endocarditis, and there was no vegetation seen.  A right arm PICC was placed.  He will complete vancomycin on 1/26/18 with a goal vancomycin trough of 15-20.     Consults:   Consults         Status Ordering Provider     Inpatient consult to [facility]  Once     Provider:  (Not yet assigned)    MIRACLE Ornelas     Inpatient consult to Infectious Diseases  Once     Provider:  Sekou Carrasco MD    Completed MIRACLE WHITE     Inpatient consult to Neurology  Once     Provider:  Claire Andrade MD    Completed MUNIR ROGER     Inpatient consult to Registered Dietitian/Nutritionist  Once     Provider:  (Not yet assigned)    MUNIR Rees      Inpatient consult to Registered Dietitian/Nutritionist  Once     Provider:  (Not yet assigned)    Completed DELBERT JOVEL          * Right leg weakness    Fall  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  Appreciate Neurology.  Hyperbaric oxygen therapy not indicated per Dr. Kuo. Inpatient rehab placement.         Bacteremia due to Staphylococcus aureus    Probably caused by abrasions from his fall. Appreciate ID and Cardiology.  MARIYA showed no vegetation.  Vancomycin until 1/26/18. Level this AM is in therapeutic range of 15 to 20.         Attention deficit hyperactivity disorder (ADHD)    Holding Adderall for now.         Essential hypertension    Resumed home Toprol XL.  Holding lisinopril.  BP controlled.        Depression    Continue trazodone 50 mg nightly. Holding Wellbutrin for now because of initial hyponatremia.         Mixed migraine and muscle contraction headache    Currently has alprazolam, baclofen, Percocet, Opana ER, tizanadine, and topiramate on his med list in our system. Not complaining of any headaches or other pain at this time, so will hold these medications.           Final Active Diagnoses:    Diagnosis Date Noted POA    PRINCIPAL PROBLEM:  Right leg weakness [R29.898] 01/05/2018 Yes    Bacteremia due to Staphylococcus aureus [R78.81] 01/11/2018 No    Toxic effect of carbon monoxide, unintentional [T58.91XA] 01/09/2018 Yes    Essential hypertension [I10] 04/20/2016 Yes     Chronic    Attention deficit hyperactivity disorder (ADHD) [F90.9] 04/20/2016 Yes     Chronic    Depression [F32.9]  Yes    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, G44.209] 01/24/2013 Yes     Chronic      Problems Resolved During this Admission:    Diagnosis Date Noted Date Resolved POA    Non-traumatic rhabdomyolysis [M62.82] 01/05/2018 01/09/2018 Yes    Cellulitis of left hand [L03.114] 01/11/2018 01/16/2018 No    Fever [R50.9] 01/10/2018 01/13/2018 No     Fall [W19.XXXA] 01/05/2018 01/09/2018 Yes    Dehydration with hyponatremia [E87.1] 01/05/2018 01/15/2018 Yes    Azotemia [R79.89] 01/05/2018 01/09/2018 Yes    Abnormal liver enzymes [R74.8] 01/05/2018 01/09/2018 Yes    Leukocytosis [D72.829] 01/05/2018 01/08/2018 Yes    Metabolic acidosis [E87.2] 01/05/2018 01/09/2018 Yes       Discharged Condition: good    Disposition: Rehab Facility    Follow Up:  Follow-up Information     Go to Vladimir Apple MD.    Specialty:  Internal Medicine  Why:  after inpatient rehab stay  Contact information:  [address]  [address]  [place] [id number] [phone]             [facility].    Why:  [facility]           Dr. Franki Lancaster On 2/2/2018.    Why:  at 3:40 pm--Neurology Follow-Up  Contact information:  [address]  [address]  [facility] [place] [id number] [phone]           Schedule an appointment as soon as possible for a visit with Hoa Chung MD.    Specialty:  Psychiatry  Why:  Psychiatric Doctor  Contact information:  [facility] [address]  [phone]                 Patient Instructions:     Ambulatory Referral to Psychiatry   Referral Priority: Routine Referral Type: Psychiatric   Referral Reason: Specialty Services Required    Requested Specialty: Psychiatry    Number of Visits Requested: 1      Diet Adult Regular     Vital signs per facility protocol     Intake and output per facility protocol     Skin assessment every shift      Notify Physician   Order Specific Question Answer Comments   Temperature (F) greater than 101    Systolic Blood Pressure SBP greater than or equal to 160    Systolic Blood Pressure SBP less than or equal to 90    Diastolic Blood Pressure DBP greater than or equal to 110    Diastolic Blood Pressure DBP less than or equal to 60    Pulse greater than or equal to 120    Pulse less than or equal to 50    Respirations Rate RR greater than or equal to 30    Respirations Rate RR  less than or equal to 6    Urine output less than 60 over 2 hours   SPO2% less than 90      Activity: Per rehab recommendations     Activity as tolerated     Full code     Pulse Oximetry         Significant Diagnostic Studies:   Microbiology:   Blood Culture   Lab Results   Component Value Date    LABBLOO No growth after 5 days. 01/12/2018     Radiology:   Imaging Results          X-Ray Chest 1 View for PICC_Central line (Final result)  Result time 01/16/18 11:23:25    Final result by Wilver Smart MD (01/16/18 11:23:25)                 Impression:     No pneumothorax.      Electronically signed by: WILVER SMART MD  Date:     01/16/18  Time:    11:23              Narrative:    AP chest    Comparison: 1/10/18    Results: There is a PICC line entering from the right, distal tip in the SVC.  The cardiac silhouette is normal in size.  The lungs are clear.  No pleural effusion, no pneumothorax.                             US Soft Tissue Misc (Final result)  Result time 01/13/18 04:47:10    Final result by Terra Montiel MD (01/13/18 04:47:10)                 Impression:      No sonographic evidence of focal/discrete fluid collection in the palmar soft tissues of the left hand.        Electronically signed by: TERRA MONTIEL  Date:     01/13/18  Time:    04:47              Narrative:    Technique: Limited sonographic evaluation of the left palmar soft tissues was performed.    Comparison: None    Findings: Limited sonographic evaluation of the soft tissues of the left palm demonstrate no discrete/focal fluid collection or other abnormality.                             X-Ray Chest AP Portable (Final result)  Result time 01/11/18 08:15:00    Final result by Balta Mayberry MD (01/11/18 08:15:00)                 Impression:      Normal chest radiograph      Electronically signed by: BALTA MAYBERRY  Date:     01/11/18  Time:    08:15              Narrative:    CLINICAL HISTORY:  fever    TECHNIQUE: Single view  portable frontal radiograph of the chest    COMPARISON: Chest radiograph 01/05/2018      FINDINGS:  Support devices: None    Chest: Cardiac and mediastinal silhouettes are normal.  Lungs are well aerated and clear.  No pleural effusion or pneumothorax.    Upper Abdomen: Normal.    Other: N/A.                             MRA Neck with contrast (Final result)     Abnormal  Result time 01/05/18 12:48:48   Procedure changed from MRA Neck     Final result by Erna Talbert MD (01/05/18 12:48:48)                 Impression:        Persistent T2 FLAIR hyperintensity within the globus pallidi (without diffusion restriction on today's exam). Findings again suggest recent carbon monoxide poisoning or other prolonged hypoxic event.    New symmetric confluent T2/FLAIR hyperintensity and subtle diffusion restriction throughout the supratentorial white matter. 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  Other acute toxic leukoencephalopathy would have a similar appearance with broad differential considerations for toxic exposure include prescription drugs (antineoplastic, immunosuppressive, antimicrobial), non-prescription drugs (heroin, MDMA, ethanol) and environmental exposures (carbon monoxide, arsenic, carbon tetrachloride). An inflammatory/infectious etiology or other demyelinating process possible but thought less likely. Clinical correlation advised.      MR angiogram of the head and neck appears within normal limits. No high grade stenosis or major branch occlusion.    Epic notification system activated.       Electronically signed by: ERNA TALBERT MD  Date:     01/05/18  Time:    12:48              Narrative:    MRI brain, MRA head, MRA neck    01/05/18 11:21:23    Accession# [id number]      CLINICAL INDICATION: 57 year old M with Right leg weakness. Evaluate for stroke.     TECHNIQUE: Multiplanar multisequence MR imaging of the brain was performed before and after the  administration of 10 ml Gadavist intravenous contrast.  Examination performed in conjunction with a 3-D time-of-flight noncontrast MR angiogram of the intracranial vasculature and a contrast enhanced MRA of the neck.  Multiple MIP reconstructions were performed.    COMPARISON: MRI 12/06/2017, CT 01/05/2018.    FINDINGS:    The ventricles are normal in size for age, without evidence of hydrocephalus.    There is symmetric confluent T2/FLAIR signal hyperintensity throughout the supratentorial white matter which is new or notably progressed from the prior exam. Subtle diffusion restriction present throughout this region. This involves primarily the deep periventricular white matter at the level of the centrum semiovale and corona radiata. Symmetric T2/FLAIR hyperintensity in the region of the globus thalami, appears less conspicuous than the prior exam.    No parenchymal mass lesion or hemorrhage. No recent or remote major vascular distribution infarct. No abnormal postcontrast parenchymal or leptomeningeal enhancement.    No extra-axial blood or fluid collections.    The T2 skull base flow voids are preserved. Bone marrow signal intensity is unremarkable.    MRA:     Common and internal carotid arteries are normal in caliber.  No significant stenosis at either carotid bifurcation.    Right vertebral artery is diminutive in caliber on a developmental basis. Vertebral arteries are otherwise unremarkable. The vertebrobasilar system appears within normal limits.     The ACAs, MCAs and PCAs demonstrate no evidence of  high-grade stenosis, focal occlusion or intracranial aneurysm.                             MRA Brain (Final result)     Abnormal  Result time 01/05/18 12:48:48    Final result by Henry Connolly MD (01/05/18 12:48:48)                 Impression:        Persistent T2 FLAIR hyperintensity within the globus pallidi (without diffusion restriction on today's exam). Findings again suggest recent carbon monoxide  poisoning or other prolonged hypoxic event.    New symmetric confluent T2/FLAIR hyperintensity and subtle diffusion restriction throughout the supratentorial white matter. 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  Other acute toxic leukoencephalopathy would have a similar appearance with broad differential considerations for toxic exposure include prescription drugs (antineoplastic, immunosuppressive, antimicrobial), non-prescription drugs (heroin, MDMA, ethanol) and environmental exposures (carbon monoxide, arsenic, carbon tetrachloride). An inflammatory/infectious etiology or other demyelinating process possible but thought less likely. Clinical correlation advised.      MR angiogram of the head and neck appears within normal limits. No high grade stenosis or major branch occlusion.    Epic notification system activated.       Electronically signed by: ERNA TALBERT MD  Date:     01/05/18  Time:    12:48              Narrative:    MRI brain, MRA head, MRA neck    01/05/18 11:21:23    Accession# [id number]      CLINICAL INDICATION: 57 year old M with Right leg weakness. Evaluate for stroke.     TECHNIQUE: Multiplanar multisequence MR imaging of the brain was performed before and after the administration of 10 ml Gadavist intravenous contrast.  Examination performed in conjunction with a 3-D time-of-flight noncontrast MR angiogram of the intracranial vasculature and a contrast enhanced MRA of the neck.  Multiple MIP reconstructions were performed.    COMPARISON: MRI 12/06/2017, CT 01/05/2018.    FINDINGS:    The ventricles are normal in size for age, without evidence of hydrocephalus.    There is symmetric confluent T2/FLAIR signal hyperintensity throughout the supratentorial white matter which is new or notably progressed from the prior exam. Subtle diffusion restriction present throughout this region. This involves primarily the deep periventricular white matter at the  level of the centrum semiovale and corona radiata. Symmetric T2/FLAIR hyperintensity in the region of the globus thalami, appears less conspicuous than the prior exam.    No parenchymal mass lesion or hemorrhage. No recent or remote major vascular distribution infarct. No abnormal postcontrast parenchymal or leptomeningeal enhancement.    No extra-axial blood or fluid collections.    The T2 skull base flow voids are preserved. Bone marrow signal intensity is unremarkable.    MRA:     Common and internal carotid arteries are normal in caliber.  No significant stenosis at either carotid bifurcation.    Right vertebral artery is diminutive in caliber on a developmental basis. Vertebral arteries are otherwise unremarkable. The vertebrobasilar system appears within normal limits.     The ACAs, MCAs and PCAs demonstrate no evidence of  high-grade stenosis, focal occlusion or intracranial aneurysm.                             MRI Brain W WO Contrast (Final result)     Abnormal  Result time 01/05/18 12:48:47    Final result by Henry Connolly MD (01/05/18 12:48:47)                 Impression:        Persistent T2 FLAIR hyperintensity within the globus pallidi (without diffusion restriction on today's exam). Findings again suggest recent carbon monoxide poisoning or other prolonged hypoxic event.    New symmetric confluent T2/FLAIR hyperintensity and subtle diffusion restriction throughout the supratentorial white matter. 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  Other acute toxic leukoencephalopathy would have a similar appearance with broad differential considerations for toxic exposure include prescription drugs (antineoplastic, immunosuppressive, antimicrobial), non-prescription drugs (heroin, MDMA, ethanol) and environmental exposures (carbon monoxide, arsenic, carbon tetrachloride). An inflammatory/infectious etiology or other demyelinating process possible but thought less likely. Clinical  correlation advised.      MR angiogram of the head and neck appears within normal limits. No high grade stenosis or major branch occlusion.    Epic notification system activated.       Electronically signed by: ERNA TALBERT MD  Date:     01/05/18  Time:    12:48              Narrative:    MRI brain, MRA head, MRA neck    01/05/18 11:21:23    Accession# [id number]      CLINICAL INDICATION: 57 year old M with Right leg weakness. Evaluate for stroke.     TECHNIQUE: Multiplanar multisequence MR imaging of the brain was performed before and after the administration of 10 ml Gadavist intravenous contrast.  Examination performed in conjunction with a 3-D time-of-flight noncontrast MR angiogram of the intracranial vasculature and a contrast enhanced MRA of the neck.  Multiple MIP reconstructions were performed.    COMPARISON: MRI 12/06/2017, CT 01/05/2018.    FINDINGS:    The ventricles are normal in size for age, without evidence of hydrocephalus.    There is symmetric confluent T2/FLAIR signal hyperintensity throughout the supratentorial white matter which is new or notably progressed from the prior exam. Subtle diffusion restriction present throughout this region. This involves primarily the deep periventricular white matter at the level of the centrum semiovale and corona radiata. Symmetric T2/FLAIR hyperintensity in the region of the globus thalami, appears less conspicuous than the prior exam.    No parenchymal mass lesion or hemorrhage. No recent or remote major vascular distribution infarct. No abnormal postcontrast parenchymal or leptomeningeal enhancement.    No extra-axial blood or fluid collections.    The T2 skull base flow voids are preserved. Bone marrow signal intensity is unremarkable.    MRA:     Common and internal carotid arteries are normal in caliber.  No significant stenosis at either carotid bifurcation.    Right vertebral artery is diminutive in caliber on a developmental basis.  Vertebral arteries are otherwise unremarkable. The vertebrobasilar system appears within normal limits.     The ACAs, MCAs and PCAs demonstrate no evidence of  high-grade stenosis, focal occlusion or intracranial aneurysm.                             X-Ray Hand 3 view Left (Final result)  Result time 01/05/18 06:58:53    Final result by Kristen Mac MD (01/05/18 06:58:53)                 Impression:        No fractures.      Electronically signed by: KRISTEN MAC MD  Date:     01/05/18  Time:    06:58              Narrative:    Technique: PA, lateral and oblique views of the left hand.    Comparison: None.    Findings:    No fractures.  No osseous destruction.  Mild cartilage space narrowing of the DIP joints noted.  Remaining cartilage spaces are well preserved.  Subcutaneous soft tissues are within normal limits.  No radiopaque foreign bodies.                             CT Cervical Spine Without Contrast (Final result)  Result time 01/05/18 05:53:00    Final result by Kristen Mac MD (01/05/18 05:53:00)                 Impression:        No fractures.  No traumatic subluxation or dislocation.      Electronically signed by: KRISTEN MAC MD  Date:     01/05/18  Time:    05:53              Narrative:    Technique: 2.5 mm axial images were obtained through the cervical spine without the use of contrast.  Coronal and sagittal reformat were performed.    Comparison: None.    Findings:    Cervical spine alignment demonstrates 2 mm of retrolisthesis of C3 on C4.  Occipital condyles and odontoid process are intact.  Atlantooccipital and atlantoaxial alignment are within normal limits.  Atlantodens interval is within normal limits allowing for degenerative changes.  Vertebral body heights are well-maintained without evidence for fracture.  Facet joints are well aligned.  Transverse foramina are unremarkable.  Posterior elements demonstrate no significant abnormalities.  Mandibular condyles are in their  expected location.    Multilevel degenerative changes identified, most pronounced at C3-C4 and C6-C7.  No large focal disc herniation by CT criteria.    Prevertebral soft tissues are normal.  Parotid, submandibular and thyroid glands are within normal limits.  No cervical lymph node enlargement.  Lung apices are clear.  Paraspinal musculature demonstrates no significant abnormalities.                             CT Head Without Contrast (Final result)  Result time 01/05/18 05:47:15    Final result by Kristen Mac MD (01/05/18 05:47:15)                 Impression:        No acute intracranial CT abnormalities.    Symmetric hypodensities within the bilateral basal ganglia, possibly related to a remote anoxic episode and unchanged when compared to MRI dated 12/06/2017.      Electronically signed by: KRISTEN MAC MD  Date:     01/05/18  Time:    05:47              Narrative:    Technique: 5-mm axial images were obtained through the head without the use of IV contrast. Coronal and sagittal reformats were performed.    Comparison: None.    Findings:    No CT findings to suggest an acute major vascular distribution infarct.  Symmetric hypodensities noted within the bilateral basal ganglia.  No intra-or extra-axial hemorrhage.  No hydrocephalus.  No midline shift or mass effect.  Sellar region is unremarkable.    Paranasal sinuses and mastoids are clear.  No acute osseous abnormalities.  Globes are symmetric.  Subcutaneous soft tissues are normal.                             X-Ray Foot Complete Right (Final result)  Result time 01/05/18 05:41:57    Final result by Kristen Mac MD (01/05/18 05:41:57)                 Impression:        No fractures.      Electronically signed by: KRISTEN MAC MD  Date:     01/05/18  Time:    05:41              Narrative:    Technique: AP, lateral and oblique views of the right foot.    Comparison: None.    Findings:    No fractures.  No osseous destruction.  Distal Achilles  enthesopathy noted.  Cartilage spaces are well preserved.  Tarsometatarsal alignment is well maintained noting nonweightbearing films. Subcutaneous soft tissues demonstrate minimal swelling along the dorsum of the midfoot.                             X-Ray Chest AP Portable (Final result)  Result time 01/05/18 05:48:34    Final result by Kristen Mac MD (01/05/18 05:48:34)                 Impression:        No acute radiographic findings in the chest on this single view.      Electronically signed by: KRISTEN MAC MD  Date:     01/05/18  Time:    05:48              Narrative:    Technique: AP chest radiograph.    Comparison: None.    Findings:    Mediastinal structures are midline.  Cardiac silhouette is normal in size.  Lung volumes are normal and symmetric.  No focal consolidation.  No pneumothorax or pleural effusions.  No acute osseous abnormalities.  Degenerative changes of the spine noted. No free air beneath the diaphragm.                              Cardiac Graphics: Echocardiogram:   2D echo with color flow doppler:   Results for orders placed or performed during the hospital encounter of 01/05/18   2D echo with color flow doppler   Result Value Ref Range    EF 65 55 - 65    Diastolic Dysfunction Yes (A)     Pericardial Effusion NONE     Mitral Valve Mobility NORMAL        Pending Diagnostic Studies:     None         Medications:  Reconciled Home Medications:   Discharge Medication List as of 1/18/2018 12:28 PM      START taking these medications    Details   sodium chloride 0.9% 0.9 % SolP 250 mL with vancomycin 1,000 mg SolR 1,250 mg Inject 1,250 mg into the vein every 8 (eight) hours. Until 1/26/18 for Staphylococcus aureus bacteremia, Starting Tue 1/16/2018, Until Fri 1/26/2018, No Print         CONTINUE these medications which have CHANGED    Details   traZODone (DESYREL) 50 MG tablet Take 1 tablet (50 mg total) by mouth every evening., Starting Thu 1/18/2018, Until Fri 1/18/2019, No Print          CONTINUE these medications which have NOT CHANGED    Details   metoprolol succinate (TOPROL-XL) 50 MG 24 hr tablet Take 1 tablet (50 mg total) by mouth once daily., Starting Wed 4/20/2016, Until Fri 12/1/2017, Normal         STOP taking these medications       ALPRAZolam (XANAX) 1 MG tablet Comments:   Reason for Stopping:         baclofen (LIORESAL) 10 MG tablet Comments:   Reason for Stopping:         buPROPion (WELLBUTRIN XL) 150 MG TB24 tablet Comments:   Reason for Stopping:         dextroamphetamine-amphetamine (ADDERALL) 20 mg tablet Comments:   Reason for Stopping:         lisinopril 10 MG tablet Comments:   Reason for Stopping:         oxyCODONE-acetaminophen (PERCOCET)  mg per tablet Comments:   Reason for Stopping:         oxyMORphone (OPANA ER) 10 MG 12 hr tablet Comments:   Reason for Stopping:         tizanidine (ZANAFLEX) 4 MG tablet Comments:   Reason for Stopping:         topiramate (TOPAMAX) 100 MG tablet Comments:   Reason for Stopping:               Indwelling Lines/Drains at time of discharge:   Lines/Drains/Airways     Peripherally Inserted Central Catheter Line                 PICC Double Lumen 01/16/18 1030 right basilic 5 days                Time spent on the discharge of patient: 40 minutes  Patient was seen and examined on the date of discharge and determined to be suitable for discharge.         Manjit Farnsworth MD  Department of Hospital Medicine  [facility]

## 2018-01-22 NOTE — ASSESSMENT & PLAN NOTE
Probably caused by abrasions from his fall. Appreciate ID and Cardiology.  MARIYA showed no vegetation.  Vancomycin until 1/26/18. Level this AM is in therapeutic range of 15 to 20.

## 2018-03-21 ENCOUNTER — HOSPITAL ENCOUNTER (EMERGENCY)
Facility: HOSPITAL | Age: 58
Discharge: HOME | End: 2018-03-21
Attending: EMERGENCY MEDICINE
Payer: COMMERCIAL

## 2018-03-21 VITALS
HEART RATE: 75 BPM | RESPIRATION RATE: 18 BRPM | TEMPERATURE: 99 F | OXYGEN SATURATION: 96 % | HEIGHT: 64 IN | BODY MASS INDEX: 28.17 KG/M2 | WEIGHT: 165 LBS | SYSTOLIC BLOOD PRESSURE: 92 MMHG | DIASTOLIC BLOOD PRESSURE: 56 MMHG

## 2018-03-21 DIAGNOSIS — Z86.69: Primary | ICD-10-CM

## 2018-03-21 LAB
ALBUMIN SERPL BCP-MCNC: 3 G/DL
ALP SERPL-CCNC: 79 U/L
ALT SERPL W/O P-5'-P-CCNC: 9 U/L
AMPHET+METHAMPHET UR QL: NEGATIVE
ANION GAP SERPL CALC-SCNC: 10 MMOL/L
AST SERPL-CCNC: 15 U/L
BARBITURATES UR QL SCN>200 NG/ML: NEGATIVE
BASOPHILS # BLD AUTO: 0.03 K/UL
BASOPHILS NFR BLD: 0.2 %
BENZODIAZ UR QL SCN>200 NG/ML: NORMAL
BILIRUB SERPL-MCNC: 0.3 MG/DL
BILIRUB UR QL STRIP: NEGATIVE
BUN SERPL-MCNC: 22 MG/DL
BZE UR QL SCN: NEGATIVE
CALCIUM SERPL-MCNC: 9.3 MG/DL
CANNABINOIDS UR QL SCN: NEGATIVE
CHLORIDE SERPL-SCNC: 95 MMOL/L
CLARITY UR: CLEAR
CO2 SERPL-SCNC: 26 MMOL/L
COLOR UR: YELLOW
CREAT SERPL-MCNC: 1.4 MG/DL
CREAT UR-MCNC: 102.8 MG/DL
DIFFERENTIAL METHOD: ABNORMAL
EOSINOPHIL # BLD AUTO: 0.1 K/UL
EOSINOPHIL NFR BLD: 0.5 %
ERYTHROCYTE [DISTWIDTH] IN BLOOD BY AUTOMATED COUNT: 14.2 %
EST. GFR  (AFRICAN AMERICAN): >60 ML/MIN/1.73 M^2
EST. GFR  (NON AFRICAN AMERICAN): 55 ML/MIN/1.73 M^2
ETHANOL SERPL-MCNC: <10 MG/DL
GLUCOSE SERPL-MCNC: 108 MG/DL
GLUCOSE UR QL STRIP: NEGATIVE
HCT VFR BLD AUTO: 33.5 %
HGB BLD-MCNC: 10.8 G/DL
HGB UR QL STRIP: ABNORMAL
KETONES UR QL STRIP: NEGATIVE
LEUKOCYTE ESTERASE UR QL STRIP: NEGATIVE
LYMPHOCYTES # BLD AUTO: 1.3 K/UL
LYMPHOCYTES NFR BLD: 10.1 %
MCH RBC QN AUTO: 28.6 PG
MCHC RBC AUTO-ENTMCNC: 32.2 G/DL
MCV RBC AUTO: 89 FL
METHADONE UR QL SCN>300 NG/ML: NEGATIVE
MONOCYTES # BLD AUTO: 1.2 K/UL
MONOCYTES NFR BLD: 9 %
NEUTROPHILS # BLD AUTO: 10.4 K/UL
NEUTROPHILS NFR BLD: 80 %
NITRITE UR QL STRIP: NEGATIVE
OPIATES UR QL SCN: NORMAL
PCP UR QL SCN>25 NG/ML: NEGATIVE
PH UR STRIP: 6 [PH] (ref 5–8)
PLATELET # BLD AUTO: 320 K/UL
PMV BLD AUTO: 9.6 FL
POTASSIUM SERPL-SCNC: 4.7 MMOL/L
PROT SERPL-MCNC: 7.6 G/DL
PROT UR QL STRIP: ABNORMAL
RBC # BLD AUTO: 3.77 M/UL
SODIUM SERPL-SCNC: 131 MMOL/L
SP GR UR STRIP: 1.01 (ref 1–1.03)
TOXICOLOGY INFORMATION: NORMAL
TROPONIN I SERPL DL<=0.01 NG/ML-MCNC: 0.12 NG/ML
URN SPEC COLLECT METH UR: ABNORMAL
UROBILINOGEN UR STRIP-ACNC: NEGATIVE EU/DL
WBC # BLD AUTO: 12.94 K/UL

## 2018-03-21 PROCEDURE — 80307 DRUG TEST PRSMV CHEM ANLYZR: CPT

## 2018-03-21 PROCEDURE — 96360 HYDRATION IV INFUSION INIT: CPT

## 2018-03-21 PROCEDURE — 99285 EMERGENCY DEPT VISIT HI MDM: CPT | Mod: 25

## 2018-03-21 PROCEDURE — 80053 COMPREHEN METABOLIC PANEL: CPT

## 2018-03-21 PROCEDURE — 96361 HYDRATE IV INFUSION ADD-ON: CPT

## 2018-03-21 PROCEDURE — 81003 URINALYSIS AUTO W/O SCOPE: CPT

## 2018-03-21 PROCEDURE — 80320 DRUG SCREEN QUANTALCOHOLS: CPT

## 2018-03-21 PROCEDURE — 25000003 PHARM REV CODE 250: Performed by: EMERGENCY MEDICINE

## 2018-03-21 PROCEDURE — 84484 ASSAY OF TROPONIN QUANT: CPT

## 2018-03-21 PROCEDURE — 85025 COMPLETE CBC W/AUTO DIFF WBC: CPT

## 2018-03-21 RX ORDER — ALPRAZOLAM 1 MG/1
1 TABLET ORAL 2 TIMES DAILY
Status: ON HOLD | COMMUNITY
End: 2018-08-02 | Stop reason: CLARIF

## 2018-03-21 RX ORDER — BACLOFEN 10 MG/1
10 TABLET ORAL 3 TIMES DAILY
Status: ON HOLD | COMMUNITY
End: 2018-08-02 | Stop reason: CLARIF

## 2018-03-21 RX ORDER — BUPROPION HYDROCHLORIDE 100 MG/1
100 TABLET ORAL DAILY
COMMUNITY

## 2018-03-21 RX ORDER — OXYMORPHONE HYDROCHLORIDE 10 MG/1
10 TABLET, FILM COATED, EXTENDED RELEASE ORAL
COMMUNITY

## 2018-03-21 RX ORDER — TOPIRAMATE 100 MG/1
100 TABLET, FILM COATED ORAL DAILY
Status: ON HOLD | COMMUNITY
End: 2018-08-02 | Stop reason: CLARIF

## 2018-03-21 RX ORDER — OXYCODONE AND ACETAMINOPHEN 10; 325 MG/1; MG/1
1 TABLET ORAL EVERY 8 HOURS PRN
COMMUNITY

## 2018-03-21 RX ORDER — TIZANIDINE 4 MG/1
4 TABLET ORAL EVERY 8 HOURS
Status: ON HOLD | COMMUNITY
End: 2018-08-02 | Stop reason: CLARIF

## 2018-03-21 RX ORDER — DEXTROAMPHETAMINE SACCHARATE, AMPHETAMINE ASPARTATE MONOHYDRATE, DEXTROAMPHETAMINE SULFATE AND AMPHETAMINE SULFATE 5; 5; 5; 5 MG/1; MG/1; MG/1; MG/1
20 CAPSULE, EXTENDED RELEASE ORAL EVERY MORNING
Status: ON HOLD | COMMUNITY
End: 2018-08-02 | Stop reason: CLARIF

## 2018-03-21 RX ADMIN — SODIUM CHLORIDE 999 ML: 0.9 INJECTION, SOLUTION INTRAVENOUS at 04:03

## 2018-03-21 RX ADMIN — SODIUM CHLORIDE 1000 ML: 0.9 INJECTION, SOLUTION INTRAVENOUS at 04:03

## 2018-03-21 NOTE — ED PROVIDER NOTES
Encounter Date: 3/21/2018       History     Chief Complaint   Patient presents with    Altered Mental Status     pt's physical therapist called 911 because pt was weak while walking with her today. Has only seen pt once before. EMS reports that pt has intermittent confusion and lethargy. Pt falls asleep when not stimulated.      57-year-old male brought into the emergency department by EMS from [place], where his physical therapist called 911.  His physical therapist was making home calls, after he had been recently diagnosed with foot drop, for unclear reasons, and noted that he was acting differently.  He apparently walked out on the street, and then turned around, and required assistance getting back into the home.  According to the EMS providers, they asked him if he had eaten, and he said no when the therapist that said she just fed him a sandwich.  On my examination, when asking the patient why he is in the emergency department, he says I don't know, they brought me here.  He denies new medications, or history of falls, he trails off and conversation, but answers questions appropriately, follows commands, has no difficulty naming objects, or performing tasks.    Notably, patient's medication list includes Topamax, Zanaflex, oxymorphone, Percocet, Adderall, Wellbutrin, baclofen, Xanax.          Review of patient's allergies indicates:   Allergen Reactions    Penicillins Hives     Past Medical History:   Diagnosis Date    ADD (attention deficit disorder)     Depression     Headache(784.0)     Hypertension     Migraines      History reviewed. No pertinent surgical history.  Family History   Problem Relation Age of Onset    Heart disease Mother     Hypertension Mother     Heart disease Father     Hypertension Father      Social History   Substance Use Topics    Smoking status: Never Smoker    Smokeless tobacco: Never Used    Alcohol use 0.6 oz/week     1 Shots of liquor per week     Review of Systems  "  Eyes: Negative.    Endocrine: Negative.    Allergic/Immunologic: Negative.    All other systems reviewed and are negative.      Physical Exam     Initial Vitals [03/21/18 1415]   BP Pulse Resp Temp SpO2   105/64 81 20 99.2 °F (37.3 °C) 100 %      MAP       77.67         Physical Exam    Nursing note and vitals reviewed.  Constitutional: He appears well-developed and well-nourished.   HENT:   Head: Normocephalic.   Eyes: EOM are normal. Pupils are equal, round, and reactive to light.   Neck: Normal range of motion.   Cardiovascular: Normal rate.   Pulmonary/Chest: Breath sounds normal.   Abdominal: Soft. Bowel sounds are normal.   Musculoskeletal: Normal range of motion.   Neurological: He is alert and oriented to person, place, and time. He has normal strength.   He walks with a limp, with an antalgic gait, given his right-sided foot drop.  He has 3 out of 5 strength with hip flexion ankle dorsiflexion, and leg extension.  He has 5 out of 5 strength of his upper extremities is normal reflexes of his upper extremities.  He has no cranial nerve deficits he has no dysarthria or aphasia.  Has an 8 scrotal stroke scale is 0.  He does have some behavioral components, where he at some points he doesn't answer questions, however at other points answers and perfectly appropriately.  During my stroke scale evaluation, patient would not answer the last question, then after leaving the room, he asked the medical student "what? Did they bring you in here to rough me up if I get questions wrong? "    Skin: Skin is warm and dry.         ED Course   Procedures  Labs Reviewed   CBC W/ AUTO DIFFERENTIAL - Abnormal; Notable for the following:        Result Value    WBC 12.94 (*)     RBC 3.77 (*)     Hemoglobin 10.8 (*)     Hematocrit 33.5 (*)     Gran # (ANC) 10.4 (*)     Mono # 1.2 (*)     Gran% 80.0 (*)     Lymph% 10.1 (*)     All other components within normal limits   COMPREHENSIVE METABOLIC PANEL - Abnormal; Notable for the " following:     Sodium 131 (*)     BUN, Bld 22 (*)     Albumin 3.0 (*)     ALT 9 (*)     eGFR if non  55 (*)     All other components within normal limits   URINALYSIS - Abnormal; Notable for the following:     Protein, UA Trace (*)     Occult Blood UA Trace (*)     All other components within normal limits   TROPONIN I - Abnormal; Notable for the following:     Troponin I 0.115 (*)     All other components within normal limits   DRUG SCREEN PANEL, URINE EMERGENCY   ALCOHOL,MEDICAL (ETHANOL)             Medical Decision Making:   History:   I obtained history from: EMS provider.       <> Summary of History: Patient was not answering questions appropriately, and according to EMS provider therapist called brother, was listening to him and also saying he was not answering questions appropriately.  Initial Assessment:   This is a 49-year-old male with a diagnosis of foot drop, as well as depression, migraines, and ADHD, presents emergency department by EMS for a change in his mental status that was noticed this morning by physical therapist  Differential Diagnosis:   Drug, toxic encephalopathy, metabolic, less likely structural, most likely behavioral  Clinical Tests:   Lab Tests: Ordered and Reviewed  Radiological Study: Ordered and Reviewed  Medical Tests: Ordered and Reviewed  ED Management:  Patient's episode of confusion, resolved in the emergency department.  He does have a history of toxic encephalopathy.  Given his prior history, and his resolution of symptoms, patient was discharged home                   ED Course as of Mar 22 0145   Wed Mar 21, 2018   1818 Patient is back at his baseline, and has previously been diagnosed with toxic metabolic encephalopathy. Will dc back home  [MG]      ED Course User Index  [MG] Nasreen Rivera MD     Clinical Impression:   The encounter diagnosis was H/O toxic encephalopathy.                           Nasreen Rivera MD  03/22/18 0146

## 2018-04-05 NOTE — ASSESSMENT & PLAN NOTE
Azotemia  Dehydration with hyponatremia  Metabolic acidosis  Continue IV fluids. Monitor the renal function and CK.     anabella wife

## 2018-07-02 DIAGNOSIS — G44.209 MIXED MIGRAINE AND MUSCLE CONTRACTION HEADACHE: ICD-10-CM

## 2018-07-02 DIAGNOSIS — G43.909 MIXED MIGRAINE AND MUSCLE CONTRACTION HEADACHE: ICD-10-CM

## 2018-07-02 DIAGNOSIS — F32.A DEPRESSION, UNSPECIFIED DEPRESSION TYPE: ICD-10-CM

## 2018-07-02 RX ORDER — ALPRAZOLAM 1 MG/1
TABLET ORAL
Qty: 60 TABLET | Refills: 3 | Status: SHIPPED | OUTPATIENT
Start: 2018-07-02 | End: 2018-10-22 | Stop reason: SDUPTHER

## 2018-07-23 LAB
AORTIC ATHEROMA: YES
DIASTOLIC DYSFUNCTION: NO
RETIRED EF AND QEF - SEE NOTES: 65 (ref 55–65)

## 2018-08-01 ENCOUNTER — HOSPITAL ENCOUNTER (OUTPATIENT)
Facility: HOSPITAL | Age: 58
Discharge: HOME | End: 2018-08-03
Attending: EMERGENCY MEDICINE | Admitting: HOSPITALIST
Payer: COMMERCIAL

## 2018-08-01 DIAGNOSIS — R06.02 SHORTNESS OF BREATH: ICD-10-CM

## 2018-08-01 DIAGNOSIS — R29.898 RIGHT LEG WEAKNESS: ICD-10-CM

## 2018-08-01 DIAGNOSIS — I50.9 CHF (CONGESTIVE HEART FAILURE): ICD-10-CM

## 2018-08-01 DIAGNOSIS — J81.0 ACUTE PULMONARY EDEMA: Primary | ICD-10-CM

## 2018-08-01 PROCEDURE — 99291 CRITICAL CARE FIRST HOUR: CPT | Mod: 25

## 2018-08-01 PROCEDURE — 82962 GLUCOSE BLOOD TEST: CPT

## 2018-08-01 PROCEDURE — 96376 TX/PRO/DX INJ SAME DRUG ADON: CPT

## 2018-08-01 PROCEDURE — 93005 ELECTROCARDIOGRAM TRACING: CPT

## 2018-08-01 PROCEDURE — 81003 URINALYSIS AUTO W/O SCOPE: CPT

## 2018-08-01 PROCEDURE — 94660 CPAP INITIATION&MGMT: CPT

## 2018-08-01 PROCEDURE — 96374 THER/PROPH/DIAG INJ IV PUSH: CPT

## 2018-08-01 PROCEDURE — 27000190 HC CPAP FULL FACE MASK W/VALVE

## 2018-08-01 PROCEDURE — 99900035 HC TECH TIME PER 15 MIN (STAT)

## 2018-08-01 PROCEDURE — 93010 ELECTROCARDIOGRAM REPORT: CPT | Mod: ,,, | Performed by: INTERNAL MEDICINE

## 2018-08-01 RX ORDER — FUROSEMIDE 10 MG/ML
80 INJECTION INTRAMUSCULAR; INTRAVENOUS
Status: COMPLETED | OUTPATIENT
Start: 2018-08-01 | End: 2018-08-02

## 2018-08-02 PROBLEM — J81.0 ACUTE PULMONARY EDEMA: Status: ACTIVE | Noted: 2018-08-02

## 2018-08-02 PROBLEM — J96.01 ACUTE RESPIRATORY FAILURE WITH HYPOXIA: Status: ACTIVE | Noted: 2018-08-02

## 2018-08-02 PROBLEM — I50.31 ACUTE DIASTOLIC HEART FAILURE: Status: ACTIVE | Noted: 2018-08-02

## 2018-08-02 PROBLEM — E87.1 HYPONATREMIA: Status: ACTIVE | Noted: 2018-08-02

## 2018-08-02 LAB
ALBUMIN SERPL BCP-MCNC: 4.1 G/DL
ALLENS TEST: ABNORMAL
ALP SERPL-CCNC: 74 U/L
ALT SERPL W/O P-5'-P-CCNC: 13 U/L
AMPHET+METHAMPHET UR QL: NEGATIVE
ANION GAP SERPL CALC-SCNC: 10 MMOL/L
ANION GAP SERPL CALC-SCNC: 12 MMOL/L
AST SERPL-CCNC: 23 U/L
BARBITURATES UR QL SCN>200 NG/ML: NEGATIVE
BASOPHILS # BLD AUTO: 0.06 K/UL
BASOPHILS # BLD AUTO: 0.16 K/UL
BASOPHILS NFR BLD: 0.3 %
BASOPHILS NFR BLD: 1 %
BENZODIAZ UR QL SCN>200 NG/ML: NEGATIVE
BILIRUB SERPL-MCNC: 1.1 MG/DL
BILIRUB UR QL STRIP: NEGATIVE
BNP SERPL-MCNC: 188 PG/ML
BUN SERPL-MCNC: 18 MG/DL
BUN SERPL-MCNC: 19 MG/DL
BZE UR QL SCN: NEGATIVE
CALCIUM SERPL-MCNC: 9.4 MG/DL
CALCIUM SERPL-MCNC: 9.8 MG/DL
CANNABINOIDS UR QL SCN: NEGATIVE
CHLORIDE SERPL-SCNC: 95 MMOL/L
CHLORIDE SERPL-SCNC: 98 MMOL/L
CLARITY UR: CLEAR
CO2 SERPL-SCNC: 22 MMOL/L
CO2 SERPL-SCNC: 26 MMOL/L
COLOR UR: YELLOW
CREAT SERPL-MCNC: 0.9 MG/DL
CREAT SERPL-MCNC: 1 MG/DL
CREAT UR-MCNC: 29.5 MG/DL
D DIMER PPP IA.FEU-MCNC: 1.24 MG/L FEU
DELSYS: ABNORMAL
DIASTOLIC DYSFUNCTION: YES
DIFFERENTIAL METHOD: ABNORMAL
DIFFERENTIAL METHOD: ABNORMAL
EOSINOPHIL # BLD AUTO: 0.1 K/UL
EOSINOPHIL # BLD AUTO: 0.1 K/UL
EOSINOPHIL NFR BLD: 0.2 %
EOSINOPHIL NFR BLD: 0.6 %
EP: 7
ERYTHROCYTE [DISTWIDTH] IN BLOOD BY AUTOMATED COUNT: 13.9 %
ERYTHROCYTE [DISTWIDTH] IN BLOOD BY AUTOMATED COUNT: 14.2 %
EST. GFR  (AFRICAN AMERICAN): >60 ML/MIN/1.73 M^2
EST. GFR  (AFRICAN AMERICAN): >60 ML/MIN/1.73 M^2
EST. GFR  (NON AFRICAN AMERICAN): >60 ML/MIN/1.73 M^2
EST. GFR  (NON AFRICAN AMERICAN): >60 ML/MIN/1.73 M^2
ETHANOL SERPL-MCNC: <10 MG/DL
FIO2: 40
GLUCOSE SERPL-MCNC: 116 MG/DL
GLUCOSE SERPL-MCNC: 139 MG/DL
GLUCOSE UR QL STRIP: NEGATIVE
HCO3 UR-SCNC: 24.8 MMOL/L (ref 24–28)
HCT VFR BLD AUTO: 40.5 %
HCT VFR BLD AUTO: 41.4 %
HGB BLD-MCNC: 13.8 G/DL
HGB BLD-MCNC: 14.5 G/DL
HGB UR QL STRIP: ABNORMAL
INR PPP: 1
IP: 12
KETONES UR QL STRIP: NEGATIVE
LEUKOCYTE ESTERASE UR QL STRIP: NEGATIVE
LYMPHOCYTES # BLD AUTO: 1 K/UL
LYMPHOCYTES # BLD AUTO: 1.8 K/UL
LYMPHOCYTES NFR BLD: 10.8 %
LYMPHOCYTES NFR BLD: 4.6 %
MCH RBC QN AUTO: 29.2 PG
MCH RBC QN AUTO: 30.3 PG
MCHC RBC AUTO-ENTMCNC: 34.1 G/DL
MCHC RBC AUTO-ENTMCNC: 35 G/DL
MCV RBC AUTO: 86 FL
MCV RBC AUTO: 87 FL
METHADONE UR QL SCN>300 NG/ML: NEGATIVE
MITRAL VALVE MOBILITY: NORMAL
MODE: ABNORMAL
MONOCYTES # BLD AUTO: 1.3 K/UL
MONOCYTES # BLD AUTO: 1.6 K/UL
MONOCYTES NFR BLD: 7.5 %
MONOCYTES NFR BLD: 7.7 %
NEUTROPHILS # BLD AUTO: 13 K/UL
NEUTROPHILS # BLD AUTO: 18.8 K/UL
NEUTROPHILS NFR BLD: 79.7 %
NEUTROPHILS NFR BLD: 87.4 %
NITRITE UR QL STRIP: NEGATIVE
OPIATES UR QL SCN: NEGATIVE
PCO2 BLDA: 42.7 MMHG (ref 35–45)
PCP UR QL SCN>25 NG/ML: NEGATIVE
PH SMN: 7.37 [PH] (ref 7.35–7.45)
PH UR STRIP: 6 [PH] (ref 5–8)
PLATELET # BLD AUTO: 282 K/UL
PLATELET # BLD AUTO: 303 K/UL
PMV BLD AUTO: 10.3 FL
PMV BLD AUTO: 10.7 FL
PO2 BLDA: 61 MMHG (ref 80–100)
POC BE: 0 MMOL/L
POC SATURATED O2: 90 % (ref 95–100)
POC TCO2: 26 MMOL/L (ref 23–27)
POCT GLUCOSE: 152 MG/DL (ref 70–110)
POTASSIUM SERPL-SCNC: 4.1 MMOL/L
POTASSIUM SERPL-SCNC: 5 MMOL/L
PROT SERPL-MCNC: 8.4 G/DL
PROT UR QL STRIP: NEGATIVE
PROTHROMBIN TIME: 10.4 SEC
RBC # BLD AUTO: 4.72 M/UL
RBC # BLD AUTO: 4.78 M/UL
RETIRED EF AND QEF - SEE NOTES: 75 (ref 55–65)
SAMPLE: ABNORMAL
SITE: ABNORMAL
SODIUM SERPL-SCNC: 131 MMOL/L
SODIUM SERPL-SCNC: 132 MMOL/L
SP GR UR STRIP: 1.01 (ref 1–1.03)
TOXICOLOGY INFORMATION: NORMAL
TROPONIN I SERPL DL<=0.01 NG/ML-MCNC: 0.11 NG/ML
TROPONIN ISTAT: 0.04 NG/ML
URN SPEC COLLECT METH UR: ABNORMAL
UROBILINOGEN UR STRIP-ACNC: NEGATIVE EU/DL
WBC # BLD AUTO: 16.26 K/UL
WBC # BLD AUTO: 21.5 K/UL

## 2018-08-02 PROCEDURE — 25000003 PHARM REV CODE 250: Performed by: EMERGENCY MEDICINE

## 2018-08-02 PROCEDURE — 93306 TTE W/DOPPLER COMPLETE: CPT | Mod: 26,,, | Performed by: INTERNAL MEDICINE

## 2018-08-02 PROCEDURE — 80048 BASIC METABOLIC PNL TOTAL CA: CPT

## 2018-08-02 PROCEDURE — 36600 WITHDRAWAL OF ARTERIAL BLOOD: CPT

## 2018-08-02 PROCEDURE — 85025 COMPLETE CBC W/AUTO DIFF WBC: CPT | Mod: 91

## 2018-08-02 PROCEDURE — 84484 ASSAY OF TROPONIN QUANT: CPT

## 2018-08-02 PROCEDURE — 84484 ASSAY OF TROPONIN QUANT: CPT | Mod: 91

## 2018-08-02 PROCEDURE — 94761 N-INVAS EAR/PLS OXIMETRY MLT: CPT

## 2018-08-02 PROCEDURE — G0378 HOSPITAL OBSERVATION PER HR: HCPCS

## 2018-08-02 PROCEDURE — 25500020 PHARM REV CODE 255: Performed by: EMERGENCY MEDICINE

## 2018-08-02 PROCEDURE — 93306 TTE W/DOPPLER COMPLETE: CPT

## 2018-08-02 PROCEDURE — 80307 DRUG TEST PRSMV CHEM ANLYZR: CPT

## 2018-08-02 PROCEDURE — 25000003 PHARM REV CODE 250: Performed by: HOSPITALIST

## 2018-08-02 PROCEDURE — 85610 PROTHROMBIN TIME: CPT

## 2018-08-02 PROCEDURE — 82803 BLOOD GASES ANY COMBINATION: CPT

## 2018-08-02 PROCEDURE — 80320 DRUG SCREEN QUANTALCOHOLS: CPT

## 2018-08-02 PROCEDURE — 63600175 PHARM REV CODE 636 W HCPCS: Performed by: EMERGENCY MEDICINE

## 2018-08-02 PROCEDURE — G8979 MOBILITY GOAL STATUS: HCPCS | Mod: CH

## 2018-08-02 PROCEDURE — 63600175 PHARM REV CODE 636 W HCPCS: Performed by: HOSPITALIST

## 2018-08-02 PROCEDURE — 97161 PT EVAL LOW COMPLEX 20 MIN: CPT

## 2018-08-02 PROCEDURE — G8978 MOBILITY CURRENT STATUS: HCPCS | Mod: CJ

## 2018-08-02 PROCEDURE — 94660 CPAP INITIATION&MGMT: CPT

## 2018-08-02 PROCEDURE — 80053 COMPREHEN METABOLIC PANEL: CPT

## 2018-08-02 PROCEDURE — A4216 STERILE WATER/SALINE, 10 ML: HCPCS | Performed by: HOSPITALIST

## 2018-08-02 PROCEDURE — 85379 FIBRIN DEGRADATION QUANT: CPT

## 2018-08-02 PROCEDURE — 99900035 HC TECH TIME PER 15 MIN (STAT)

## 2018-08-02 PROCEDURE — 83880 ASSAY OF NATRIURETIC PEPTIDE: CPT

## 2018-08-02 RX ORDER — ACETAMINOPHEN 325 MG/1
650 TABLET ORAL EVERY 6 HOURS PRN
Status: DISCONTINUED | OUTPATIENT
Start: 2018-08-02 | End: 2018-08-03 | Stop reason: HOSPADM

## 2018-08-02 RX ORDER — BUPROPION HYDROCHLORIDE 100 MG/1
100 TABLET ORAL DAILY
Status: DISCONTINUED | OUTPATIENT
Start: 2018-08-02 | End: 2018-08-03 | Stop reason: HOSPADM

## 2018-08-02 RX ORDER — MORPHINE SULFATE 2 MG/ML
2 INJECTION, SOLUTION INTRAMUSCULAR; INTRAVENOUS EVERY 4 HOURS PRN
Status: DISCONTINUED | OUTPATIENT
Start: 2018-08-02 | End: 2018-08-03 | Stop reason: HOSPADM

## 2018-08-02 RX ORDER — ACETAMINOPHEN 325 MG/1
650 TABLET ORAL EVERY 6 HOURS PRN
Status: DISCONTINUED | OUTPATIENT
Start: 2018-08-02 | End: 2018-08-02

## 2018-08-02 RX ORDER — SODIUM CHLORIDE 0.9 % (FLUSH) 0.9 %
3 SYRINGE (ML) INJECTION EVERY 8 HOURS
Status: DISCONTINUED | OUTPATIENT
Start: 2018-08-02 | End: 2018-08-03 | Stop reason: HOSPADM

## 2018-08-02 RX ORDER — FUROSEMIDE 10 MG/ML
40 INJECTION INTRAMUSCULAR; INTRAVENOUS DAILY
Status: DISCONTINUED | OUTPATIENT
Start: 2018-08-02 | End: 2018-08-03

## 2018-08-02 RX ORDER — TOPIRAMATE 100 MG/1
100 TABLET, FILM COATED ORAL DAILY
Status: DISCONTINUED | OUTPATIENT
Start: 2018-08-02 | End: 2018-08-02

## 2018-08-02 RX ORDER — MORPHINE SULFATE 4 MG/ML
4 INJECTION, SOLUTION INTRAMUSCULAR; INTRAVENOUS EVERY 4 HOURS PRN
Status: DISCONTINUED | OUTPATIENT
Start: 2018-08-02 | End: 2018-08-02

## 2018-08-02 RX ORDER — ONDANSETRON 2 MG/ML
4 INJECTION INTRAMUSCULAR; INTRAVENOUS EVERY 6 HOURS PRN
Status: DISCONTINUED | OUTPATIENT
Start: 2018-08-02 | End: 2018-08-03 | Stop reason: HOSPADM

## 2018-08-02 RX ORDER — OXYCODONE AND ACETAMINOPHEN 5; 325 MG/1; MG/1
1 TABLET ORAL EVERY 6 HOURS PRN
Status: DISCONTINUED | OUTPATIENT
Start: 2018-08-02 | End: 2018-08-03 | Stop reason: HOSPADM

## 2018-08-02 RX ORDER — TRAZODONE HYDROCHLORIDE 50 MG/1
50 TABLET ORAL NIGHTLY
Status: DISCONTINUED | OUTPATIENT
Start: 2018-08-02 | End: 2018-08-03 | Stop reason: HOSPADM

## 2018-08-02 RX ORDER — OXYCODONE AND ACETAMINOPHEN 10; 325 MG/1; MG/1
1 TABLET ORAL EVERY 6 HOURS PRN
Status: DISCONTINUED | OUTPATIENT
Start: 2018-08-02 | End: 2018-08-03 | Stop reason: HOSPADM

## 2018-08-02 RX ORDER — MORPHINE SULFATE 2 MG/ML
2 INJECTION, SOLUTION INTRAMUSCULAR; INTRAVENOUS EVERY 4 HOURS PRN
Status: DISCONTINUED | OUTPATIENT
Start: 2018-08-02 | End: 2018-08-02

## 2018-08-02 RX ORDER — ALPRAZOLAM 1 MG/1
1 TABLET ORAL 2 TIMES DAILY
Status: DISCONTINUED | OUTPATIENT
Start: 2018-08-02 | End: 2018-08-03 | Stop reason: HOSPADM

## 2018-08-02 RX ORDER — OXYCODONE AND ACETAMINOPHEN 10; 325 MG/1; MG/1
1 TABLET ORAL EVERY 8 HOURS PRN
Status: DISCONTINUED | OUTPATIENT
Start: 2018-08-02 | End: 2018-08-02

## 2018-08-02 RX ADMIN — MORPHINE SULFATE 2 MG: 2 INJECTION, SOLUTION INTRAMUSCULAR; INTRAVENOUS at 04:08

## 2018-08-02 RX ADMIN — FUROSEMIDE 80 MG: 10 INJECTION, SOLUTION INTRAMUSCULAR; INTRAVENOUS at 12:08

## 2018-08-02 RX ADMIN — TOPIRAMATE 100 MG: 100 TABLET, FILM COATED ORAL at 10:08

## 2018-08-02 RX ADMIN — TRAZODONE HYDROCHLORIDE 50 MG: 50 TABLET ORAL at 08:08

## 2018-08-02 RX ADMIN — OXYCODONE HYDROCHLORIDE AND ACETAMINOPHEN 1 TABLET: 10; 325 TABLET ORAL at 02:08

## 2018-08-02 RX ADMIN — IOHEXOL 100 ML: 350 INJECTION, SOLUTION INTRAVENOUS at 02:08

## 2018-08-02 RX ADMIN — ACETAMINOPHEN 650 MG: 325 TABLET ORAL at 05:08

## 2018-08-02 RX ADMIN — BUPROPION HYDROCHLORIDE 100 MG: 100 TABLET, FILM COATED ORAL at 02:08

## 2018-08-02 RX ADMIN — ALPRAZOLAM 1 MG: 1 TABLET ORAL at 08:08

## 2018-08-02 RX ADMIN — Medication 3 ML: at 10:08

## 2018-08-02 RX ADMIN — OXYCODONE HYDROCHLORIDE AND ACETAMINOPHEN 1 TABLET: 10; 325 TABLET ORAL at 08:08

## 2018-08-02 RX ADMIN — Medication 3 ML: at 02:08

## 2018-08-02 RX ADMIN — FUROSEMIDE 40 MG: 10 INJECTION, SOLUTION INTRAMUSCULAR; INTRAVENOUS at 10:08

## 2018-08-02 NOTE — ED PROVIDER NOTES
Encounter Date: 8/1/2018    SCRIBE #1 NOTE: I, Brant Craig, am scribing for, and in the presence of,  Dr. Lefort. I have scribed the entire note.       History   No chief complaint on file.    Spenser Rabago is a 57 y.o. male who  has a past medical history of ADD (attention deficit disorder); Depression; Headache(784.0); Hypertension; and Migraines.    The patient presents via EMS to the ED due to SOB that began about 2 hours ago. Per EMS, initially in moderate respiratory distress with audible crackles. His initial oxygen saturation was 73% on RA. Patient's condition improved en route after application of CPAP, 3 doses of nitro paste, and 324mg Aspirin. Patient's current saturation on CPAP is 97%. He denies any heart or lung problems. Reports he has had SOB in the past but not to this extent. Patient has never being hospitalized for SOB before. He admits to swelling in legs; denies any fever, cough, or chest pain.        The history is provided by the patient.     Review of patient's allergies indicates:   Allergen Reactions    Penicillins Hives     Past Medical History:   Diagnosis Date    ADD (attention deficit disorder)     Depression     Headache(784.0)     Hypertension     Migraines      No past surgical history on file.  Family History   Problem Relation Age of Onset    Heart disease Mother     Hypertension Mother     Heart disease Father     Hypertension Father      Social History   Substance Use Topics    Smoking status: Never Smoker    Smokeless tobacco: Never Used    Alcohol use 0.6 oz/week     1 Shots of liquor per week     Review of Systems   Constitutional: Negative for chills and fever.   HENT: Negative for congestion, ear pain, rhinorrhea and sore throat.    Respiratory: Positive for shortness of breath. Negative for cough and wheezing.    Cardiovascular: Negative for chest pain and palpitations.   Gastrointestinal: Negative for abdominal pain, diarrhea, nausea and vomiting.    Genitourinary: Negative for dysuria and hematuria.   Musculoskeletal: Negative for back pain, myalgias and neck pain.   Skin: Negative for rash.   Neurological: Negative for dizziness, weakness, light-headedness and headaches.   Psychiatric/Behavioral: Negative for confusion.   All other systems reviewed and are negative.      Physical Exam     Initial Vitals   BP Pulse Resp Temp SpO2   -- -- -- -- --      MAP       --         Physical Exam    Nursing note and vitals reviewed.  Constitutional: He appears well-developed and well-nourished. He is not diaphoretic. No distress.   HENT:   Head: Normocephalic and atraumatic.   Mouth/Throat: Oropharynx is clear and moist.   Eyes: Conjunctivae and EOM are normal. Pupils are equal, round, and reactive to light.   Neck: Normal range of motion. Neck supple. JVD present.   Cardiovascular: Regular rhythm and intact distal pulses. Exam reveals no gallop and no friction rub.    Murmur heard.  Tachycardic. Holosystolic murmur heard on exam.   Pulmonary/Chest: He has no wheezes. He has no rhonchi. He has rales (diffuse).   Abdominal: Soft. He exhibits no distension. There is no tenderness. There is no rebound and no guarding.   Musculoskeletal: Normal range of motion. He exhibits no edema or tenderness.   No LE edema.   Neurological: He is alert and oriented to person, place, and time. He has normal strength.   Skin: Skin is warm and dry. Capillary refill takes less than 2 seconds.   Psychiatric: Thought content normal.         ED Course   Critical Care  Date/Time: 8/2/2018 3:49 AM  Performed by: DELBERT JOVEL  Authorized by: LEFORT, GUY J.   Direct patient critical care time: 15 minutes  Additional history critical care time: 15 minutes  Ordering / reviewing critical care time: 15 minutes  Documentation critical care time: 15 minutes  Consulting other physicians critical care time: 15 minutes  Consult with family critical care time: 10 minutes  Total critical care time  (exclusive of procedural time) : 85 minutes  Critical care time was exclusive of separately billable procedures and treating other patients.  Critical care was necessary to treat or prevent imminent or life-threatening deterioration of the following conditions: cardiac failure and respiratory failure.  Critical care was time spent personally by me on the following activities: interpretation of cardiac output measurements, evaluation of patient's response to treatment, examination of patient, obtaining history from patient or surrogate, ordering and performing treatments and interventions, ordering and review of laboratory studies, ordering and review of radiographic studies, pulse oximetry, re-evaluation of patient's condition and review of old charts.        Labs Reviewed   CBC W/ AUTO DIFFERENTIAL - Abnormal; Notable for the following:        Result Value    WBC 21.50 (*)     Gran # (ANC) 18.8 (*)     Mono # 1.6 (*)     Gran% 87.4 (*)     Lymph% 4.6 (*)     All other components within normal limits   D DIMER, QUANTITATIVE - Abnormal; Notable for the following:     D-Dimer 1.24 (*)     All other components within normal limits   URINALYSIS - Abnormal; Notable for the following:     Occult Blood UA Trace (*)     All other components within normal limits   POCT GLUCOSE - Abnormal; Notable for the following:     POCT Glucose 152 (*)     All other components within normal limits   ISTAT PROCEDURE - Abnormal; Notable for the following:     POC PO2 61 (*)     POC SATURATED O2 90 (*)     All other components within normal limits   PROTIME-INR   COMPREHENSIVE METABOLIC PANEL   TROPONIN I   B-TYPE NATRIURETIC PEPTIDE   POCT GLUCOSE MONITORING CONTINUOUS     EKG Readings: (Independently Interpreted)   23:50: EKG: Rate 107 bpm. Sinus tachycardia. QTc: 421 ms. Possible left atrial enlargement. Borderline EKG.       Imaging Results          X-Ray Chest AP Portable (Final result)  Result time 08/02/18 00:33:07    Final result by  Carlos Alberto Perdomo MD (08/02/18 00:33:07)                 Impression:      Cardiomegaly with mild bilateral edema.  No effusion.      Electronically signed by: Carlos Alberto Perdomo MD  Date:    08/02/2018  Time:    00:33             Narrative:    EXAMINATION:  XR CHEST AP PORTABLE    CLINICAL HISTORY:  CHF;    TECHNIQUE:  Single frontal view of the chest was performed.    COMPARISON:  None    FINDINGS:  Cardiomegaly with mild bilateral edema.  No effusion or pneumothorax.    Heart and lungs otherwise appear unchanged when allowing for differences in technique and positioning.                              X-Rays:   Independently Interpreted Readings:   Other Readings:  Imaging interpreted by radiologist and visualized by me:     Imaging Results          CTA Chest Non-Coronary (PE Study) (Final result)  Result time 08/02/18 03:05:56    Final result by Trixie Nichols MD (08/02/18 03:05:56)                 Impression:      1. No evidence of PE.  2. Bronchiectasis with patchy opacities and ground-glass attenuation seen throughout the lungs with additional multifocal patchy consolidative changes.  Findings are nonspecific but could reflect multifocal or atypical pneumonia, pulmonary edema, or other diffuse infectious or inflammatory process.  Consider pulmonary medicine consultation if not previously obtained.  3. More confluent focal area of consolidation seen within the medial aspect of the right lower lobe.  This could represent similar process or atelectasis although potential neoplastic process not excluded.  Future follow-up is recommended to ensure resolution.  4. Enlarged subcarinal lymph node, possibly reactive.      Electronically signed by: Trixie Nichols MD  Date:    08/02/2018  Time:    03:05             Narrative:    EXAMINATION:  CTA CHEST NON CORONARY    CLINICAL HISTORY:  Chest pain, acute, PE suspected, intermed prob, positive D-dimer;    TECHNIQUE:  Low dose axial images, sagittal and coronal reformations  were obtained from the thoracic inlet to the lung bases following the IV administration of 100 mL of Omnipaque 350.  Contrast timing was optimized to evaluate the pulmonary arteries.  MIP images were performed.    COMPARISON:  None    FINDINGS:  Structures at the base of the neck are unremarkable.  Aorta is non-aneurysmal.  The heart is normal in size without pericardial effusion.  No intraluminal filling defects within the pulmonary arteries to suggest pulmonary thromboembolism.   Enlarged subcarinal lymph node is visualized measuring 1.7 cm.  The esophagus maintains a normal course and caliber.    The trachea and bronchi are patent.  The lungs are symmetrically expanded.  There is bilateral bronchiectasis, more prominent within the lower lobes.  Patchy opacities and focal patchy consolidative changes are seen within the lungs.  Focal area of consolidation versus atelectasis is seen within the medial aspect of the right lower lobe with potential underlying neoplastic process not excluded in this region.  No evidence of pneumothorax or significant effusion.    Spleen is mildly enlarged.  Osseous structures demonstrate degenerative change.  Extrathoracic soft tissues are unremarkable.                               X-Ray Chest AP Portable (Final result)  Result time 08/02/18 00:33:07    Final result by Carlos Alberto Perdomo MD (08/02/18 00:33:07)                 Impression:      Cardiomegaly with mild bilateral edema.  No effusion.      Electronically signed by: Carlos Alberto Perdomo MD  Date:    08/02/2018  Time:    00:33             Narrative:    EXAMINATION:  XR CHEST AP PORTABLE    CLINICAL HISTORY:  CHF;    TECHNIQUE:  Single frontal view of the chest was performed.    COMPARISON:  None    FINDINGS:  Cardiomegaly with mild bilateral edema.  No effusion or pneumothorax.    Heart and lungs otherwise appear unchanged when allowing for differences in technique and positioning.                                Medical Decision  Making:   Clinical Tests:   Lab Tests: Ordered and Reviewed  Radiological Study: Ordered and Reviewed  Medical Tests: Ordered and Reviewed  ED Management:  Accepted hand off from Dr. LeFort for follow-up of pending CTA, which is negative. Patient has clinically improved significantly.  I discussed the case with Dr. Farnsworth, who agrees with plan to admit for observation.  Patient comfortable with admission at this time.  Car to see admit orders have been placed.                      Clinical Impression:     1. Acute pulmonary edema    2. Shortness of breath            Disposition:   Disposition: Discharged  Condition: Stable       I, Dr. Brian Monroe, personally performed the services described in this documentation. All medical record entries made by the scribe were at my direction and in my presence.  I have reviewed the chart and agree that the record reflects my personal performance and is accurate and complete. Brian Monroe MD.  3:49 AM 08/02/2018                     Brian Monroe MD  08/02/18 0349

## 2018-08-02 NOTE — H&P
"[facility] Hospital Medicine  History & Physical    Patient Name: Spenser Rabago  MRN: [id number]  Admission Date: 8/1/2018  Attending Physician: Manjit Farnsworth MD   Primary Care Provider: Vladimir Apple MD      Patient information was obtained from patient and ER records.     Subjective:     Principal Problem:Acute respiratory failure with hypoxia    Chief Complaint:   Chief Complaint   Patient presents with    Shortness of Breath     sudden  onset of sob approx. 1.5hrs ago. ems reports initial moderate distress with audible crackles.  initial sao2-73%. condition improved in route after application of cpap, ntg. sl x3, 1" ortiz to cw and asa 324mg p.o..  sao2 on arrival 93%.         HPI: Mr. Rabago is a 58 y/o white male with hypertension, migraines, cluster headaches, ADHD and depression who presented by EMS to [facility] ED in acute respiratory distress. Patient states that he was walking around after waking up from a nap around 11pm and felt that he could not catch his breath for over an hour. He admits to cough and headache with onset of the dyspnea, but denies recent illness. Per EMS, his initial SpO2 was 73% on room air and he was placed on CPAP en route with improvement to 97%. He is currently breathing comfortably on room air and states that his symptoms have improved. He states that he has had some shortness of breath in the past but never this severe. He denies any prior hospitalizations for similar episodes. No exacerbating or alleviating factors identified. No recent changes in medication. 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 He denies fever, chills, chest pain, diaphoresis, abdominal pain, or dizziness. BNP was 188 and troponin I was 0.113. D-dimer was elevated at 1.24. Chest XR showed cardiomegaly with mild bilateral edema. Echo showed grade 1 diastolic " dysfunction. CTA revealed no evidence of PE. Patient admitted for observation.    Past Medical History:   Diagnosis Date    ADD (attention deficit disorder)     Depression     Headache(784.0)     Hypertension     Migraines        History reviewed. No pertinent surgical history.    Review of patient's allergies indicates:   Allergen Reactions    Penicillins Hives       No current facility-administered medications on file prior to encounter.      Current Outpatient Prescriptions on File Prior to Encounter   Medication Sig    ALPRAZolam (XANAX) 1 MG tablet TAKE 1 TABLET BY MOUTH TWO TIMES A DAY    buPROPion (WELLBUTRIN) 100 MG tablet Take 100 mg by mouth once daily.     oxyCODONE-acetaminophen (PERCOCET)  mg per tablet Take 1 tablet by mouth every 8 (eight) hours as needed for Pain.    oxyMORphone (OPANA ER) 10 MG 12 hr tablet Take 10 mg by mouth every 12 (twelve) hours.    traZODone (DESYREL) 50 MG tablet Take 1 tablet (50 mg total) by mouth every evening.    [DISCONTINUED] ALPRAZolam (XANAX) 1 MG tablet Take 1 mg by mouth 2 (two) times daily.    [DISCONTINUED] baclofen (LIORESAL) 10 MG tablet Take 10 mg by mouth 3 (three) times daily.    [DISCONTINUED] dextroamphetamine-amphetamine (ADDERALL XR) 20 MG 24 hr capsule Take 20 mg by mouth every morning.    [DISCONTINUED] tiZANidine (ZANAFLEX) 4 MG tablet Take 4 mg by mouth every 8 (eight) hours.    [DISCONTINUED] topiramate (TOPAMAX) 100 MG tablet Take 100 mg by mouth once daily.     Family History     Problem Relation (Age of Onset)    Heart disease Mother, Father    Hypertension Mother, Father        Social History Main Topics    Smoking status: Never Smoker    Smokeless tobacco: Never Used    Alcohol use 0.6 oz/week     1 Shots of liquor per week    Drug use: No    Sexual activity: Not on file     Review of Systems   Constitutional: Negative for chills, diaphoresis and fever.   HENT: Negative for congestion, rhinorrhea and sore throat.     Eyes: Negative for redness and visual disturbance.   Respiratory: Positive for cough and shortness of breath. Negative for chest tightness, wheezing and stridor.    Cardiovascular: Negative for chest pain, palpitations and leg swelling.   Gastrointestinal: Negative for abdominal pain, nausea and vomiting.   Endocrine: Negative.    Genitourinary: Negative for dysuria, flank pain and hematuria.   Skin: Negative for color change and pallor.   Neurological: Positive for headaches. Negative for dizziness, syncope, speech difficulty, weakness and light-headedness.     Objective:     Vital Signs (Most Recent):  Temp: 98.7 °F (37.1 °C) (08/02/18 1201)  Pulse: 85 (08/02/18 1222)  Resp: 17 (08/02/18 1222)  BP: 116/65 (08/02/18 1201)  SpO2: 95 % (08/02/18 1340) Vital Signs (24h Range):  Temp:  [97.9 °F (36.6 °C)-98.7 °F (37.1 °C)] 98.7 °F (37.1 °C)  Pulse:  [] 85  Resp:  [16-20] 17  SpO2:  [95 %-100 %] 95 %  BP: ()/(56-89) 116/65     Weight: 73.3 kg (161 lb 9.6 oz)  Body mass index is 27.74 kg/m².    Physical Exam   Constitutional: He is oriented to person, place, and time. He appears well-developed and well-nourished.   HENT:   Head: Normocephalic and atraumatic.   Eyes: EOM are normal. Pupils are equal, round, and reactive to light.   Neck: Normal range of motion. Neck supple.   Cardiovascular: Normal rate, regular rhythm and intact distal pulses.    Murmur heard.  Pulmonary/Chest: Effort normal. No respiratory distress. He has no wheezes. He has rales. He exhibits no tenderness.   Abdominal: Soft. Bowel sounds are normal.   Musculoskeletal: He exhibits no edema.   Neurological: He is alert and oriented to person, place, and time.   Skin: Skin is warm and dry. Capillary refill takes less than 2 seconds.   Psychiatric: He has a normal mood and affect.         CRANIAL NERVES     CN III, IV, VI   Pupils are equal, round, and reactive to light.  Extraocular motions are normal.        Significant Labs:   ABGs:    Recent Labs  Lab 08/01/18  2355   PH 7.373   PCO2 42.7   HCO3 24.8   POCSATURATED 90*   BE 0     BMP:   Recent Labs  Lab 08/02/18  0600   *   *   K 4.1   CL 95   CO2 26   BUN 18   CREATININE 0.9   CALCIUM 9.4     CBC:     Recent Labs  Lab 08/02/18  0002 08/02/18 0600   WBC 21.50* 16.26*   HGB 14.5 13.8*   HCT 41.4 40.5    282     Cardiac Markers:     Recent Labs  Lab 08/02/18  0002 08/02/18 0600   *  --    TROPISTAT  --  0.04     Troponin:     Recent Labs  Lab 08/02/18  0002   TROPONINI 0.113*     D-dimer: 1.24    Significant Imaging: I have reviewed all pertinent imaging results/findings within the past 24 hours.                    Assessment/Plan:     * Acute respiratory failure with hypoxia    Acute pulmonary edema  Admitted for observation. SpO2 improved on CPAP, now breathing comfortably on RA at 98%. D-dimer elevated. CTA showed no pulmonary embolism.    Acute diastolic heart failure  Giving furosemide. Echo showed grade 1 diastolic dysfunction. Troponin I was 0.113, will continue to trend.          Essential hypertension    Low sodium diet          Depression    Continue home bupropion           VTE Risk Mitigation         Ordered     IP VTE LOW RISK PATIENT  Once      08/02/18 0803     Place sequential compression device  Until discontinued      08/02/18 0803             Latisha Selby PA-C  Department of Hospital Medicine   [facility]

## 2018-08-02 NOTE — ED TRIAGE NOTES
Pt. To the ER via [facility] with c/o having shortness of breath and cough that began today. Pt. Placed on cardiac, BP and continuous pulse oximetry monitors. Respiratory at the bedside placing pt. On BiPap. Skin is diaphoretic, denies c/o chest pain or chest discomfort. Reports having a headache. Bed in the low position, side rails elevated x 2 and call light at the bedside. Dr. LeFort at the bedside.

## 2018-08-02 NOTE — SUBJECTIVE & OBJECTIVE
Past Medical History:   Diagnosis Date    ADD (attention deficit disorder)     Depression     Headache(784.0)     Hypertension     Migraines        History reviewed. No pertinent surgical history.    Review of patient's allergies indicates:   Allergen Reactions    Penicillins Hives       No current facility-administered medications on file prior to encounter.      Current Outpatient Prescriptions on File Prior to Encounter   Medication Sig    ALPRAZolam (XANAX) 1 MG tablet TAKE 1 TABLET BY MOUTH TWO TIMES A DAY    buPROPion (WELLBUTRIN) 100 MG tablet Take 100 mg by mouth once daily.     oxyCODONE-acetaminophen (PERCOCET)  mg per tablet Take 1 tablet by mouth every 8 (eight) hours as needed for Pain.    oxyMORphone (OPANA ER) 10 MG 12 hr tablet Take 10 mg by mouth every 12 (twelve) hours.    traZODone (DESYREL) 50 MG tablet Take 1 tablet (50 mg total) by mouth every evening.    [DISCONTINUED] ALPRAZolam (XANAX) 1 MG tablet Take 1 mg by mouth 2 (two) times daily.    [DISCONTINUED] baclofen (LIORESAL) 10 MG tablet Take 10 mg by mouth 3 (three) times daily.    [DISCONTINUED] dextroamphetamine-amphetamine (ADDERALL XR) 20 MG 24 hr capsule Take 20 mg by mouth every morning.    [DISCONTINUED] tiZANidine (ZANAFLEX) 4 MG tablet Take 4 mg by mouth every 8 (eight) hours.    [DISCONTINUED] topiramate (TOPAMAX) 100 MG tablet Take 100 mg by mouth once daily.     Family History     Problem Relation (Age of Onset)    Heart disease Mother, Father    Hypertension Mother, Father        Social History Main Topics    Smoking status: Never Smoker    Smokeless tobacco: Never Used    Alcohol use 0.6 oz/week     1 Shots of liquor per week    Drug use: No    Sexual activity: Not on file     Review of Systems   Constitutional: Negative for chills, diaphoresis and fever.   HENT: Negative for congestion, rhinorrhea and sore throat.    Eyes: Negative for redness and visual disturbance.   Respiratory: Positive for cough  and shortness of breath. Negative for chest tightness, wheezing and stridor.    Cardiovascular: Negative for chest pain, palpitations and leg swelling.   Gastrointestinal: Negative for abdominal pain, nausea and vomiting.   Endocrine: Negative.    Genitourinary: Negative for dysuria, flank pain and hematuria.   Skin: Negative for color change and pallor.   Neurological: Positive for headaches. Negative for dizziness, syncope, speech difficulty, weakness and light-headedness.     Objective:     Vital Signs (Most Recent):  Temp: 98.7 °F (37.1 °C) (08/02/18 1201)  Pulse: 85 (08/02/18 1222)  Resp: 17 (08/02/18 1222)  BP: 116/65 (08/02/18 1201)  SpO2: 95 % (08/02/18 1340) Vital Signs (24h Range):  Temp:  [97.9 °F (36.6 °C)-98.7 °F (37.1 °C)] 98.7 °F (37.1 °C)  Pulse:  [] 85  Resp:  [16-20] 17  SpO2:  [95 %-100 %] 95 %  BP: ()/(56-89) 116/65     Weight: 73.3 kg (161 lb 9.6 oz)  Body mass index is 27.74 kg/m².    Physical Exam   Constitutional: He is oriented to person, place, and time. He appears well-developed and well-nourished.   HENT:   Head: Normocephalic and atraumatic.   Eyes: EOM are normal. Pupils are equal, round, and reactive to light.   Neck: Normal range of motion. Neck supple.   Cardiovascular: Normal rate, regular rhythm and intact distal pulses.    Murmur heard.  Pulmonary/Chest: Effort normal. No respiratory distress. He has no wheezes. He has rales. He exhibits no tenderness.   Abdominal: Soft. Bowel sounds are normal.   Musculoskeletal: He exhibits no edema.   Neurological: He is alert and oriented to person, place, and time.   Skin: Skin is warm and dry. Capillary refill takes less than 2 seconds.   Psychiatric: He has a normal mood and affect.         CRANIAL NERVES     CN III, IV, VI   Pupils are equal, round, and reactive to light.  Extraocular motions are normal.        Significant Labs:   ABGs:   Recent Labs  Lab 08/01/18  2355   PH 7.373   PCO2 42.7   HCO3 24.8   POCSATURATED 90*   BE  0     BMP:   Recent Labs  Lab 08/02/18  0600   *   *   K 4.1   CL 95   CO2 26   BUN 18   CREATININE 0.9   CALCIUM 9.4     CBC:     Recent Labs  Lab 08/02/18  0002 08/02/18 0600   WBC 21.50* 16.26*   HGB 14.5 13.8*   HCT 41.4 40.5    282     Cardiac Markers:     Recent Labs  Lab 08/02/18  0002 08/02/18 0600   *  --    TROPISTAT  --  0.04     Troponin:     Recent Labs  Lab 08/02/18  0002   TROPONINI 0.113*     D-dimer: 1.24    Significant Imaging: I have reviewed all pertinent imaging results/findings within the past 24 hours.

## 2018-08-02 NOTE — ED NOTES
Pt. Returned to room from CT scan. Pt. returned to cardiac, BP and continuous pulse oximetry monitors. Skin is PWD. Resp. Even and non labored. Oxygen saturations are 97% on 2 L NC.

## 2018-08-02 NOTE — ASSESSMENT & PLAN NOTE
Acute pulmonary edema  Admitted for observation. SpO2 improved on CPAP, now breathing comfortably on RA at 98%. D-dimer elevated. CTA showed no pulmonary embolism.    Acute diastolic heart failure  Giving furosemide. Echo showed grade 1 diastolic dysfunction. Troponin I was 0.113, will continue to trend.

## 2018-08-02 NOTE — HPI
"Mr. Rabago is a 56 y/o white male with hypertension, migraines, cluster headaches, ADHD and depression who presented by EMS to [facility] ED in acute respiratory distress. Patient states that he was walking around after waking up from a nap around 11pm and felt that he could not catch his breath for over an hour. He admits to cough and headache with onset of the dyspnea, but denies recent illness. Per EMS, his initial SpO2 was 73% on room air and he was placed on CPAP en route with improvement to 97%. He is currently breathing comfortably on room air and states that his symptoms have improved. He states that he has had some shortness of breath in the past but never this severe. He denies any prior hospitalizations for similar episodes. No exacerbating or alleviating factors identified. No recent changes in medication. 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 He denies fever, chills, chest pain, diaphoresis, abdominal pain, or dizziness. BNP was 188 and troponin I was 0.113. D-dimer was elevated at 1.24. Chest XR showed cardiomegaly with mild bilateral edema. Echo showed grade 1 diastolic dysfunction. CTA revealed no evidence of PE. Patient admitted for observation.  "

## 2018-08-02 NOTE — PT/OT/SLP EVAL
Physical Therapy Evaluation    Patient Name:  Spenser Rabago   MRN:  [id number]    Recommendations:     Discharge Recommendations:  home, outpatient PT   Discharge Equipment Recommendations: cane, straight   Barriers to discharge: None    Assessment:     Spenser Rabago is a 57 y.o. male admitted with a medical diagnosis of Acute respiratory failure with hypoxia.  He presents with the following impairments/functional limitations:  impaired functional mobilty, gait instability, decreased lower extremity function, impaired balance . Patient demonstrates decrease step and stride lengths with increase DIVINE. May benefit from SPC and higher balance training    Rehab Prognosis:  good; patient would benefit from acute skilled PT services to address these deficits and reach maximum level of function.      Recent Surgery: * No surgery found *      Plan:     During this hospitalization, patient to be seen 6 x/week to address the above listed problems via therapeutic activities, therapeutic exercises, gait training  · Plan of Care Expires:  18   Plan of Care Reviewed with: patient    Subjective     Communicated with primary nurse prior to session.  Patient found supine upon PT entry to room, agreeable to evaluation.      Chief Complaint: none voiced  Patient comments/goals: go home  Pain/Comfort:  · Pain Rating 1: 0/10  · Pain Rating Post-Intervention 1: 0/10    Patients cultural, spiritual, Caodaism conflicts given the current situation:      Living Environment:  Lives alone H on concerns  Prior to admission, patients level of function was independent.  Patient has the following equipment: shower chair, walker, rolling.  DME owned (not currently used): none.  Upon discharge, patient will have assistance from family?.    Objective:     Patient found with:       General Precautions: Standard, fall   Orthopedic Precautions:N/A   Braces: N/A     Exams:  · RLE ROM: WFL except ankle and foot  active and passive    · RLE Strength: WFL except ankle and foot +2/5 to -3/5  · LLE ROM: WFL  · LLE Strength: WFL    Functional Mobility:  · Bed Mobility:     · Supine to Sit: supervision  · Sit to Supine: supervision  · Transfers:     · Sit to Stand:  supervision with no AD  · Gait: 100 ft no AD decrease R foot clearance  · Balance: fair     AM-PAC 6 CLICK MOBILITY  Total Score:21       Therapeutic Activities and Exercises:   na    Patient left supine with all lines intact and call button in reach.    GOALS:    Physical Therapy Goals        Problem: Physical Therapy Goal    Goal Priority Disciplines Outcome Goal Variances Interventions   Physical Therapy Goal     PT/OT, PT Ongoing (interventions implemented as appropriate)     Description:  Goals to be met by: 2018     Patient will increase functional independence with mobility by performin. Supine to sit with Modified [place]  2. Sit to stand transfer with Modified [place]  3. Gait  x 200 feet with Modified [place] using Single-point Cane .                       History:     Past Medical History:   Diagnosis Date    ADD (attention deficit disorder)     Depression     Headache(784.0)     Hypertension     Migraines        History reviewed. No pertinent surgical history.    Clinical Decision Making:     History  Co-morbidities and personal factors that may impact the plan of care Examination  Body Structures and Functions, activity limitations and participation restrictions that may impact the plan of care Clinical Presentation   Decision Making/ Complexity Score   Co-morbidities:   [] Time since onset of injury / illness / exacerbation  [] Status of current condition  []Patient's cognitive status and safety concerns    [] Multiple Medical Problems (see med hx)  Personal Factors:   [] Patient's age  [x] Prior Level of function   [] Patient's home situation (environment and family support)  [] Patient's level of motivation  [] Expected progression of  patient      HISTORY:(criteria)    [] 53937 - no personal factors/history    [x] 73648 - has 1-2 personal factor/comorbidity     [] 07916 - has >3 personal factor/comorbidity     Body Regions:  [] Objective examination findings  [] Head     []  Neck  [] Trunk   [] Upper Extremity  [x] Lower Extremity    Body Systems:  [] For communication ability, affect, cognition, language, and learning style: the assessment of the ability to make needs known, consciousness, orientation (person, place, and time), expected emotional /behavioral responses, and learning preferences (eg, learning barriers, education  needs)  [x] For the neuromuscular system: a general assessment of gross coordinated movement (eg, balance, gait, locomotion, transfers, and transitions) and motor function  (motor control and motor learning)  [x] For the musculoskeletal system: the assessment of gross symmetry, gross range of motion, gross strength, height, and weight  [] For the integumentary system: the assessment of pliability(texture), presence of scar formation, skin color, and skin integrity  [x] For cardiovascular/pulmonary system: the assessment of heart rate, respiratory rate, blood pressure, and edema     Activity limitations:    [] Patient's cognitive status and saf ety concerns          [] Status of current condition      [] Weight bearing restriction  [] Cardiopulmunary Restriction    Participation Restrictions:   [] Goals and goal agreement with the patient     [] Rehab potential (prognosis) and probable outcome      Examination of Body System: (criteria)    [] 55076 - addressing 1-2 elements    [x] 49971 - addressing a total of 3 or more elements     [] 35743 -  Addressing a total of 4 or more elements         Clinical Presentation: (criteria)  Stable - 37303     On examination of body system using standardized tests and measures patient presents with 3 or more elements from any of the following: body structures and functions, activity  limitations, and/or participation restrictions.  Leading to a clinical presentation that is considered stable and/or uncomplicated                              Clinical Decision Making  (Eval Complexity):  Low- 86621     Time Tracking:     PT Received On: 08/02/18  PT Start Time: 1540     PT Stop Time: 1600  PT Total Time (min): 20 min     Billable Minutes: Evaluation 20      Luther Westbrook, PT  08/02/2018

## 2018-08-02 NOTE — PLAN OF CARE
Problem: Physical Therapy Goal  Goal: Physical Therapy Goal  Goals to be met by: 2018     Patient will increase functional independence with mobility by performin. Supine to sit with Modified [place]  2. Sit to stand transfer with Modified [place]  3. Gait  x 200 feet with Modified [place] using Single-point Cane .     Outcome: Ongoing (interventions implemented as appropriate)  Recommend Home possible outpatient PT    May benefit from SPC

## 2018-08-02 NOTE — ED NOTES
Pt. Removed off of Bipap. Pt. Tolerated well. Placed on 2 L NC and tolerating oxygen saturations at 95 %. Skin dry.

## 2018-08-02 NOTE — PLAN OF CARE
Pt voices no d/c needs. PCP follow up scheduled.  Pt given d/c folder, brochure, and business card and encouraged to call for any needs.       08/02/18 0825   Discharge Assessment   Assessment Type Discharge Planning Assessment   Confirmed/corrected address and phone number on facesheet? Yes   Assessment information obtained from? Patient   Expected Length of Stay (days) 1   Communicated expected length of stay with patient/caregiver yes   Prior to hospitilization cognitive status: Alert/Oriented   Prior to hospitalization functional status: Independent   Current cognitive status: Alert/Oriented   Current Functional Status: Independent   Lives With alone   Able to Return to Prior Arrangements yes   Is patient able to care for self after discharge? Yes   Who are your caregiver(s) and their phone number(s)? Mando (brother) [phone]   Patient's perception of discharge disposition home or selfcare   Readmission Within The Last 30 Days no previous admission in last 30 days   Patient currently being followed by outpatient case management? No   Patient currently receives any other outside agency services? No   Equipment Currently Used at Home none   Do you have any problems affording any of your prescribed medications? No   Is the patient taking medications as prescribed? yes   Does the patient have transportation home? Yes  (brother)   Transportation Available family or friend will provide   Does the patient receive services at the [facility]? No   Discharge Plan A Home   Discharge Plan B Home with family   Patient/Family In Agreement With Plan yes

## 2018-08-03 VITALS
OXYGEN SATURATION: 96 % | WEIGHT: 157.88 LBS | DIASTOLIC BLOOD PRESSURE: 64 MMHG | RESPIRATION RATE: 18 BRPM | HEIGHT: 64 IN | SYSTOLIC BLOOD PRESSURE: 114 MMHG | BODY MASS INDEX: 26.95 KG/M2 | HEART RATE: 82 BPM | TEMPERATURE: 98 F

## 2018-08-03 PROBLEM — J96.01 ACUTE RESPIRATORY FAILURE WITH HYPOXIA: Status: RESOLVED | Noted: 2018-08-02 | Resolved: 2018-08-03

## 2018-08-03 PROBLEM — J81.0 ACUTE PULMONARY EDEMA: Status: RESOLVED | Noted: 2018-08-02 | Resolved: 2018-08-03

## 2018-08-03 LAB
ANION GAP SERPL CALC-SCNC: 8 MMOL/L
BASOPHILS # BLD AUTO: 0.05 K/UL
BASOPHILS NFR BLD: 0.4 %
BUN SERPL-MCNC: 24 MG/DL
CALCIUM SERPL-MCNC: 9.4 MG/DL
CHLORIDE SERPL-SCNC: 94 MMOL/L
CO2 SERPL-SCNC: 27 MMOL/L
CREAT SERPL-MCNC: 1.1 MG/DL
DIFFERENTIAL METHOD: ABNORMAL
EOSINOPHIL # BLD AUTO: 1 K/UL
EOSINOPHIL NFR BLD: 7.5 %
ERYTHROCYTE [DISTWIDTH] IN BLOOD BY AUTOMATED COUNT: 14.2 %
EST. GFR  (AFRICAN AMERICAN): >60 ML/MIN/1.73 M^2
EST. GFR  (NON AFRICAN AMERICAN): >60 ML/MIN/1.73 M^2
GLUCOSE SERPL-MCNC: 93 MG/DL
HCT VFR BLD AUTO: 40 %
HGB BLD-MCNC: 13.7 G/DL
LYMPHOCYTES # BLD AUTO: 2.7 K/UL
LYMPHOCYTES NFR BLD: 20.1 %
MCH RBC QN AUTO: 29.9 PG
MCHC RBC AUTO-ENTMCNC: 34.3 G/DL
MCV RBC AUTO: 87 FL
MONOCYTES # BLD AUTO: 1.2 K/UL
MONOCYTES NFR BLD: 9.1 %
NEUTROPHILS # BLD AUTO: 8.5 K/UL
NEUTROPHILS NFR BLD: 62.9 %
PLATELET # BLD AUTO: 259 K/UL
PMV BLD AUTO: 10.6 FL
POTASSIUM SERPL-SCNC: 4.1 MMOL/L
RBC # BLD AUTO: 4.58 M/UL
SODIUM SERPL-SCNC: 129 MMOL/L
WBC # BLD AUTO: 13.47 K/UL

## 2018-08-03 PROCEDURE — 94761 N-INVAS EAR/PLS OXIMETRY MLT: CPT

## 2018-08-03 PROCEDURE — 63600175 PHARM REV CODE 636 W HCPCS: Performed by: HOSPITALIST

## 2018-08-03 PROCEDURE — G0378 HOSPITAL OBSERVATION PER HR: HCPCS

## 2018-08-03 PROCEDURE — 36415 COLL VENOUS BLD VENIPUNCTURE: CPT

## 2018-08-03 PROCEDURE — 97116 GAIT TRAINING THERAPY: CPT

## 2018-08-03 PROCEDURE — 25000003 PHARM REV CODE 250: Performed by: HOSPITALIST

## 2018-08-03 PROCEDURE — 80048 BASIC METABOLIC PNL TOTAL CA: CPT

## 2018-08-03 PROCEDURE — A4216 STERILE WATER/SALINE, 10 ML: HCPCS | Performed by: HOSPITALIST

## 2018-08-03 PROCEDURE — 85025 COMPLETE CBC W/AUTO DIFF WBC: CPT

## 2018-08-03 RX ORDER — ENOXAPARIN SODIUM 100 MG/ML
40 INJECTION SUBCUTANEOUS EVERY 24 HOURS
Status: DISCONTINUED | OUTPATIENT
Start: 2018-08-03 | End: 2018-08-03 | Stop reason: HOSPADM

## 2018-08-03 RX ADMIN — Medication 3 ML: at 03:08

## 2018-08-03 RX ADMIN — OXYCODONE HYDROCHLORIDE AND ACETAMINOPHEN 1 TABLET: 10; 325 TABLET ORAL at 07:08

## 2018-08-03 RX ADMIN — OXYCODONE HYDROCHLORIDE AND ACETAMINOPHEN 1 TABLET: 10; 325 TABLET ORAL at 03:08

## 2018-08-03 RX ADMIN — MORPHINE SULFATE 2 MG: 2 INJECTION, SOLUTION INTRAMUSCULAR; INTRAVENOUS at 09:08

## 2018-08-03 RX ADMIN — BUPROPION HYDROCHLORIDE 100 MG: 100 TABLET, FILM COATED ORAL at 09:08

## 2018-08-03 RX ADMIN — MORPHINE SULFATE 2 MG: 2 INJECTION, SOLUTION INTRAMUSCULAR; INTRAVENOUS at 06:08

## 2018-08-03 RX ADMIN — ALPRAZOLAM 1 MG: 1 TABLET ORAL at 09:08

## 2018-08-03 RX ADMIN — MORPHINE SULFATE 2 MG: 2 INJECTION, SOLUTION INTRAMUSCULAR; INTRAVENOUS at 12:08

## 2018-08-03 RX ADMIN — MORPHINE SULFATE 2 MG: 2 INJECTION, SOLUTION INTRAMUSCULAR; INTRAVENOUS at 02:08

## 2018-08-03 RX ADMIN — Medication 3 ML: at 06:08

## 2018-08-03 NOTE — SUBJECTIVE & OBJECTIVE
Interval History: Patient weaned off oxygen, breathing comfortably on RA. No acute complaints, denies shortness of breath, cough, chest pain, leg swelling.    Review of Systems   Constitutional: Negative for chills and fever.   HENT: Negative for congestion, rhinorrhea and sore throat.    Respiratory: Negative for cough, chest tightness, shortness of breath and wheezing.    Cardiovascular: Negative for chest pain and leg swelling.   Gastrointestinal: Negative for abdominal pain, constipation and nausea.   Genitourinary: Negative for difficulty urinating.   Musculoskeletal: Positive for gait problem (Right leg weakness). Negative for arthralgias and myalgias.   Skin: Negative for color change and pallor.   Neurological: Negative for dizziness and headaches.     Objective:     Vital Signs (Most Recent):  Temp: 99.3 °F (37.4 °C) (08/03/18 0747)  Pulse: 89 (08/03/18 0800)  Resp: 18 (08/03/18 0747)  BP: 100/66 (08/03/18 0747)  SpO2: (!) 94 % (08/03/18 0720) Vital Signs (24h Range):  Temp:  [97.8 °F (36.6 °C)-99.3 °F (37.4 °C)] 99.3 °F (37.4 °C)  Pulse:  [72-92] 89  Resp:  [16-20] 18  SpO2:  [94 %-95 %] 94 %  BP: (100-129)/(65-78) 100/66     Weight: 71.6 kg (157 lb 13.6 oz)  Body mass index is 27.09 kg/m².    Intake/Output Summary (Last 24 hours) at 08/03/18 1156  Last data filed at 08/02/18 1600   Gross per 24 hour   Intake              420 ml   Output                0 ml   Net              420 ml      Physical Exam   Constitutional: He is oriented to person, place, and time. He appears well-developed and well-nourished.   HENT:   Head: Normocephalic and atraumatic.   Neck: Normal range of motion. Neck supple. No JVD present.   Cardiovascular: Normal rate, regular rhythm and intact distal pulses.    Murmur heard.  Pulmonary/Chest: Effort normal. No accessory muscle usage. No tachypnea. No respiratory distress. He has no wheezes. He has rales. He exhibits no tenderness.   Abdominal: Soft. Bowel sounds are normal. He  exhibits no distension. There is no tenderness.   Musculoskeletal: He exhibits no edema.   Neurological: He is alert and oriented to person, place, and time.   Psychiatric: He has a normal mood and affect. His behavior is normal.       Significant Labs: All pertinent labs within the past 24 hours have been reviewed.    Significant Imaging: I have reviewed all pertinent imaging results/findings within the past 24 hours.

## 2018-08-03 NOTE — ASSESSMENT & PLAN NOTE
Acute diastolic heart failure  Low sodium diet, monitor BP. SBP ranged 105-129. Has not been on any antihypertensives at home. Will not start on any medications at home as BP is currently well controlled. Follow up as an outpatient with PCP next week.

## 2018-08-03 NOTE — PT/OT/SLP PROGRESS
Physical Therapy Treatment Discharge    Patient Name:  Spenser Rabago   MRN:  [id number]    Recommendations:     Discharge Recommendations:  outpatient PT   Discharge Equipment Recommendations: cane, straight   Barriers to discharge: None    Assessment:     Spenser Rabago is a 57 y.o. male admitted with a medical diagnosis of Acute respiratory failure with hypoxia.  He presents with the following impairments/functional limitations:  gait instability, impaired functional mobilty, impaired balance, decreased ROM Patient instructed in use of SPC during gait, improvement note with step stride and wt shift. Goals met will DC PT service at this time .    Rehab Prognosis:  good; patient would benefit from acute skilled PT services to address these deficits and reach maximum level of function.      Recent Surgery: * No surgery found *      Plan:     During this hospitalization, patient to be seen 6 x/week to address the above listed problems via gait training, therapeutic activities, therapeutic exercises  · Plan of Care Expires:  09/02/18   Plan of Care Reviewed with: patient    Subjective     Communicated with primary nurse prior to session.  Patient found supine upon PT entry to room, agreeable to treatment.      Chief Complaint: none voiced  Patient comments/goals: go home  Pain/Comfort:  · Pain Rating 1: 0/10  · Pain Rating Post-Intervention 1: 0/10    Patients cultural, spiritual, Synagogue conflicts given the current situation:      Objective:     Patient found with:       General Precautions: Standard, fall   Orthopedic Precautions:N/A   Braces: N/A     Functional Mobility:  · Bed Mobility:     · Supine to Sit: modified independence  · Sit to Supine: modified independence  · Transfers:     · Sit to Stand:  modified independence with no AD  · Gait: Mod I with SPC  · Balance: fair + with AD      AM-PAC 6 CLICK MOBILITY  Turning over in bed (including adjusting bedclothes, sheets and blankets)?: 4  Sitting down  on and standing up from a chair with arms (e.g., wheelchair, bedside commode, etc.): 4  Moving from lying on back to sitting on the side of the bed?: 4  Moving to and from a bed to a chair (including a wheelchair)?: 4  Need to walk in hospital room?: 4  Climbing 3-5 steps with a railing?: 4  Basic Mobility Total Score: 24       Therapeutic Activities and Exercises:   reviewed SLS for balance training    Patient left HOB elevated with call button in reach and bed alarm on..    GOALS:    Physical Therapy Goals        Problem: Physical Therapy Goal    Goal Priority Disciplines Outcome Goal Variances Interventions   Physical Therapy Goal     PT/OT, PT Ongoing (interventions implemented as appropriate)     Description:  Goals to be met by: 2018     Patient will increase functional independence with mobility by performin. Supine to sit with Modified [place]  2. Sit to stand transfer with Modified [place]  3. Gait  x 200 feet with Modified [place] using Single-point Cane .                       Time Tracking:     PT Received On: 18  PT Start Time: 910     PT Stop Time: 925  PT Total Time (min): 15 min     Billable Minutes: Gait Training 15    Treatment Type: Treatment  PT/PTA: PT           Luther Westbrook, PT  2018

## 2018-08-03 NOTE — HOSPITAL COURSE
8/3/18  Patient weaned off oxygen, breathing comfortably on RA at 94% and in no respiratory distress. Saw improvement in symptoms with CPAP and IV diuresis. Diuresed over 2 L after first night. Denies shortness of breath, cough, chest pain, nausea, or difficulty urinating. Second troponin I negative. Seen by PT who recommend outpatient therapy. Patient does not have reliable transportation and does not drive due to chronic right leg pain. States that he has not been taking his hypertension medication because he did not think it was important and would like to see his PCP to resume. CTA showed bilateral lower lobe bronchiectasis, no evidence of PE. Echo showed hyperdynamic left ventricular systolic function (EF >70%), impaired LV relaxation (grade 1 diastolic dysfunction) and normal right ventricular systolic function. CXR showed cardiomegaly with bilateral edema, no effusion.

## 2018-08-03 NOTE — PLAN OF CARE
"Pt to be discharged home with out patient therapy. TN asked for pt's preference and pt stated he had none just "close to home." TN called [facility] in [place] to call in referral. Tamy spoke with pt on phone to schedule first appointment and will call pt back. Tn placed location and phone # to facility on AVS.    Pt informed Tn and Dr. Farnsworth his brother (only ride home)  can not pick him up until after 5:00. Tn informed Trey, Floor Nurse of above.     08/03/18 7039   Final Note   Assessment Type Final Discharge Note   Discharge Disposition Home   What phone number can be called within the next 1-3 days to see how you are doing after discharge? [phone]   Hospital Follow Up  Appt(s) scheduled? Yes   Discharge plans and expectations educations in teach back method with documentation complete? Yes   Right Care Referral Info   Post Acute Recommendation Other   Referral Type out patient therapy   Facility Name [facility] [address], [place], [place]   [phone]     "

## 2018-08-03 NOTE — DISCHARGE SUMMARY
"[facility] Hospital Medicine  Discharge Summary      Patient Name: Spenser Rabago  MRN: [id number]  Admission Date: 8/1/2018  Hospital Length of Stay: 0 days  Discharge Date and Time:  08/03/2018 12:05 PM  Attending Physician: Manjit Farnsworth MD   Discharging Provider: Latisha Selby PA-C  Primary Care Provider: Vladimir Apple MD      HPI:   Mr. Rabago is a 58 y/o white male with hypertension, migraines, cluster headaches, ADHD and depression who presented by EMS to [facility] ED in acute respiratory distress. Patient states that he was walking around after waking up from a nap around 11pm and felt that he could not catch his breath for over an hour. He admits to cough and headache with onset of the dyspnea, but denies recent illness. Per EMS, his initial SpO2 was 73% on room air and he was placed on CPAP en route with improvement to 97%. He is currently breathing comfortably on room air and states that his symptoms have improved. He states that he has had some shortness of breath in the past but never this severe. He denies any prior hospitalizations for similar episodes. No exacerbating or alleviating factors identified. No recent changes in medication. 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 He denies fever, chills, chest pain, diaphoresis, abdominal pain, or dizziness. BNP was 188 and troponin I was 0.113. D-dimer was elevated at 1.24. Chest XR showed cardiomegaly with mild bilateral edema. Echo showed grade 1 diastolic dysfunction. CTA revealed no evidence of PE. Patient admitted for observation.    * No surgery found *      Hospital Course:   8/3/18  Patient weaned off oxygen, breathing comfortably on RA at 94% and in no respiratory distress. Saw improvement in symptoms with CPAP and IV diuresis. Diuresed over 2 L after first night. Denies shortness of breath, cough, " chest pain, nausea, or difficulty urinating. Second troponin I negative. Seen by PT who recommend outpatient therapy. Patient does not have reliable transportation and does not drive due to chronic right leg pain. States that he has not been taking his hypertension medication because he did not think it was important and would like to see his PCP to resume. CTA showed bilateral lower lobe bronchiectasis, no evidence of PE. Echo showed hyperdynamic left ventricular systolic function (EF >70%), impaired LV relaxation (grade 1 diastolic dysfunction) and normal right ventricular systolic function. CXR showed cardiomegaly with bilateral edema, no effusion.      Consults:     Essential hypertension    Acute diastolic heart failure  Low sodium diet, monitor BP. SBP ranged 105-129. Has not been on any antihypertensives at home. Will not start on any medications at home as BP is currently well controlled. Follow up as an outpatient with PCP next week.            Final Active Diagnoses:    Diagnosis Date Noted POA    Hyponatremia [E87.1] 08/02/2018 Yes    Acute diastolic heart failure [I50.31] 08/02/2018 Yes    Essential hypertension [I10] 04/20/2016 Yes     Chronic    Depression [F32.9]  Yes      Problems Resolved During this Admission:    Diagnosis Date Noted Date Resolved POA    PRINCIPAL PROBLEM:  Acute respiratory failure with hypoxia [J96.01] 08/02/2018 08/03/2018 Yes    Acute pulmonary edema [J81.0] 08/02/2018 08/03/2018 Yes       Discharged Condition: good    Disposition: Home or Self Care    Follow Up:  Follow-up Information     Vladimir Apple MD On 8/10/2018.    Specialty:  Internal Medicine  Why:  Time:10:30  Contact information:  [address] [place], [place] [address]  [address] [address] [phone]                 Patient Instructions:     Referral to OutPatient  Physical therapy   Referral Priority: Routine Referral Type: Physical Medicine   Referral Reason: Specialty Services Required    Requested Specialty: Physical  Therapy    Number of Visits Requested: 1      Diet Cardiac     Notify your health care provider if you experience any of the following:  difficulty breathing or increased cough     Notify your health care provider if you experience any of the following:  persistent nausea and vomiting or diarrhea     Notify your health care provider if you experience any of the following:  increased confusion or weakness     Notify your health care provider if you experience any of the following:  persistent dizziness, light-headedness, or visual disturbances     Activity as tolerated         Significant Diagnostic Studies: Labs:   BMP:   Recent Labs  Lab 08/02/18  0002 08/02/18  0600 08/03/18  0449   * 116* 93   * 131* 129*   K 5.0 4.1 4.1   CL 98 95 94*   CO2 22* 26 27   BUN 19 18 24*   CREATININE 1.0 0.9 1.1   CALCIUM 9.8 9.4 9.4   , CBC   Recent Labs  Lab 08/02/18  0002 08/02/18 0600 08/03/18  0449   WBC 21.50* 16.26* 13.47*   HGB 14.5 13.8* 13.7*   HCT 41.4 40.5 40.0    282 259    and Troponin   Recent Labs  Lab 08/02/18  0002   TROPONINI 0.113*     Radiology:   Imaging Results          CTA Chest Non-Coronary (PE Study) (Final result)  Result time 08/02/18 03:05:56    Final result by Trixie Nichols MD (08/02/18 03:05:56)                 Impression:      1. No evidence of PE.  2. Bronchiectasis with patchy opacities and ground-glass attenuation seen throughout the lungs with additional multifocal patchy consolidative changes.  Findings are nonspecific but could reflect multifocal or atypical pneumonia, pulmonary edema, or other diffuse infectious or inflammatory process.  Consider pulmonary medicine consultation if not previously obtained.  3. More confluent focal area of consolidation seen within the medial aspect of the right lower lobe.  This could represent similar process or atelectasis although potential neoplastic process not excluded.  Future follow-up is recommended to ensure resolution.  4.  Enlarged subcarinal lymph node, possibly reactive.      Electronically signed by: Trixie Nichols MD  Date:    08/02/2018  Time:    03:05             Narrative:    EXAMINATION:  CTA CHEST NON CORONARY    CLINICAL HISTORY:  Chest pain, acute, PE suspected, intermed prob, positive D-dimer;    TECHNIQUE:  Low dose axial images, sagittal and coronal reformations were obtained from the thoracic inlet to the lung bases following the IV administration of 100 mL of Omnipaque 350.  Contrast timing was optimized to evaluate the pulmonary arteries.  MIP images were performed.    COMPARISON:  None    FINDINGS:  Structures at the base of the neck are unremarkable.  Aorta is non-aneurysmal.  The heart is normal in size without pericardial effusion.  No intraluminal filling defects within the pulmonary arteries to suggest pulmonary thromboembolism.   Enlarged subcarinal lymph node is visualized measuring 1.7 cm.  The esophagus maintains a normal course and caliber.    The trachea and bronchi are patent.  The lungs are symmetrically expanded.  There is bilateral bronchiectasis, more prominent within the lower lobes.  Patchy opacities and focal patchy consolidative changes are seen within the lungs.  Focal area of consolidation versus atelectasis is seen within the medial aspect of the right lower lobe with potential underlying neoplastic process not excluded in this region.  No evidence of pneumothorax or significant effusion.    Spleen is mildly enlarged.  Osseous structures demonstrate degenerative change.  Extrathoracic soft tissues are unremarkable.                               X-Ray Chest AP Portable (Final result)  Result time 08/02/18 00:33:07    Final result by Carlos Alberto Perdomo MD (08/02/18 00:33:07)                 Impression:      Cardiomegaly with mild bilateral edema.  No effusion.      Electronically signed by: Carlos Alberto Perdomo MD  Date:    08/02/2018  Time:    00:33             Narrative:    EXAMINATION:  XR CHEST AP  PORTABLE    CLINICAL HISTORY:  CHF;    TECHNIQUE:  Single frontal view of the chest was performed.    COMPARISON:  None    FINDINGS:  Cardiomegaly with mild bilateral edema.  No effusion or pneumothorax.    Heart and lungs otherwise appear unchanged when allowing for differences in technique and positioning.                                Cardiac Graphics: Echocardiogram:   2D echo with color flow doppler:   Results for orders placed or performed during the hospital encounter of 08/01/18   2D echo with color flow doppler   Result Value Ref Range    EF 75 55 - 65    Diastolic Dysfunction Yes (A)     Mitral Valve Mobility NORMAL        Pending Diagnostic Studies:     None         Medications:  Reconciled Home Medications:      Medication List      CONTINUE taking these medications    ALPRAZolam 1 MG tablet  Commonly known as:  XANAX  TAKE 1 TABLET BY MOUTH TWO TIMES A DAY     oxyCODONE-acetaminophen  mg per tablet  Commonly known as:  PERCOCET  Take 1 tablet by mouth every 8 (eight) hours as needed for Pain.     oxyMORphone 10 MG 12 hr tablet  Commonly known as:  OPANA ER  Take 10 mg by mouth every 12 (twelve) hours.     traZODone 50 MG tablet  Commonly known as:  DESYREL  Take 1 tablet (50 mg total) by mouth every evening.     WELLBUTRIN 100 MG tablet  Generic drug:  buPROPion  Take 100 mg by mouth once daily.            Indwelling Lines/Drains at time of discharge:   Lines/Drains/Airways          No matching active lines, drains, or airways          Time spent on the discharge of patient: 35 minutes  Patient was seen and examined on the date of discharge and determined to be suitable for discharge.         Latisha Selby PA-C  Department of Hospital Medicine  [facility]

## 2018-08-03 NOTE — PROGRESS NOTES
"[facility] Hospital Medicine  Progress Note    Patient Name: Spenser Rabago  MRN: [id number]  Patient Class: OP- Observation   Admission Date: 8/1/2018  Length of Stay: 0 days  Attending Physician: Manjit Farnsworth MD  Primary Care Provider: Vladimir Apple MD        Subjective:     Principal Problem:Acute respiratory failure with hypoxia    HPI:  Mr. Rabago is a 56 y/o white male with hypertension, migraines, cluster headaches, ADHD and depression who presented by EMS to [facility] ED in acute respiratory distress. Patient states that he was walking around after waking up from a nap around 11pm and felt that he could not catch his breath for over an hour. He admits to cough and headache with onset of the dyspnea, but denies recent illness. Per EMS, his initial SpO2 was 73% on room air and he was placed on CPAP en route with improvement to 97%. He is currently breathing comfortably on room air and states that his symptoms have improved. He states that he has had some shortness of breath in the past but never this severe. He denies any prior hospitalizations for similar episodes. No exacerbating or alleviating factors identified. No recent changes in medication. 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 He denies fever, chills, chest pain, diaphoresis, abdominal pain, or dizziness. BNP was 188 and troponin I was 0.113. D-dimer was elevated at 1.24. Chest XR showed cardiomegaly with mild bilateral edema. Echo showed grade 1 diastolic dysfunction. CTA revealed no evidence of PE. Patient admitted for observation.    Hospital Course:  8/3/18  Patient weaned off oxygen, breathing comfortably on RA at 94% and in no respiratory distress. Saw improvement in symptoms with CPAP and IV diuresis. Denies shortness of breath, cough, chest pain, nausea, or difficulty urinating. Second troponin I " negative. Seen by PT who recommend outpatient therapy. Patient does not have reliable transportation and does not drive due to chronic right leg pain. States that he has not been taking his hypertension medication because he did not think it was important and would like to see his PCP to resume. CTA showed bilateral lower lobe bronchiectasis, no evidence of PE. Echo showed hyperdynamic left ventricular systolic function (EF >70%), impaired LV relaxation (grade 1 diastolic dysfunction) and normal right ventricular systolic function. CXR showed cardiomegaly with bilateral edema, no effusion.     Interval History: Patient weaned off oxygen, breathing comfortably on RA. No acute complaints, denies shortness of breath, cough, chest pain, leg swelling.    Review of Systems   Constitutional: Negative for chills and fever.   HENT: Negative for congestion, rhinorrhea and sore throat.    Respiratory: Negative for cough, chest tightness, shortness of breath and wheezing.    Cardiovascular: Negative for chest pain and leg swelling.   Gastrointestinal: Negative for abdominal pain, constipation and nausea.   Genitourinary: Negative for difficulty urinating.   Musculoskeletal: Positive for gait problem (Right leg weakness). Negative for arthralgias and myalgias.   Skin: Negative for color change and pallor.   Neurological: Negative for dizziness and headaches.     Objective:     Vital Signs (Most Recent):  Temp: 99.3 °F (37.4 °C) (08/03/18 0747)  Pulse: 89 (08/03/18 0800)  Resp: 18 (08/03/18 0747)  BP: 100/66 (08/03/18 0747)  SpO2: (!) 94 % (08/03/18 0720) Vital Signs (24h Range):  Temp:  [97.8 °F (36.6 °C)-99.3 °F (37.4 °C)] 99.3 °F (37.4 °C)  Pulse:  [72-92] 89  Resp:  [16-20] 18  SpO2:  [94 %-95 %] 94 %  BP: (100-129)/(65-78) 100/66     Weight: 71.6 kg (157 lb 13.6 oz)  Body mass index is 27.09 kg/m².    Intake/Output Summary (Last 24 hours) at 08/03/18 1156  Last data filed at 08/02/18 1600   Gross per 24 hour   Intake               420 ml   Output                0 ml   Net              420 ml      Physical Exam   Constitutional: He is oriented to person, place, and time. He appears well-developed and well-nourished.   HENT:   Head: Normocephalic and atraumatic.   Neck: Normal range of motion. Neck supple. No JVD present.   Cardiovascular: Normal rate, regular rhythm and intact distal pulses.    Murmur heard.  Pulmonary/Chest: Effort normal. No accessory muscle usage. No tachypnea. No respiratory distress. He has no wheezes. He has rales. He exhibits no tenderness.   Abdominal: Soft. Bowel sounds are normal. He exhibits no distension. There is no tenderness.   Musculoskeletal: He exhibits no edema.   Neurological: He is alert and oriented to person, place, and time.   Psychiatric: He has a normal mood and affect. His behavior is normal.       Significant Labs: All pertinent labs within the past 24 hours have been reviewed.    Significant Imaging: I have reviewed all pertinent imaging results/findings within the past 24 hours.    Assessment/Plan:      Hyponatremia    Currently 129, likely due to lasix. Will hold.          Essential hypertension    Acute diastolic heart failure  Low sodium diet, monitor BP. SBP ranged 105-129. Has not been on any antihypertensives at home. Will not start on any medications at home as BP is currently well controlled. Follow up as an outpatient with PCP next week.          Depression    Continue home bupropion           VTE Risk Mitigation         Ordered     enoxaparin injection 40 mg  Daily      08/03/18 0831     IP VTE HIGH RISK PATIENT  Once      08/03/18 0831     Place sequential compression device  Until discontinued      08/02/18 0803              Latisha Selby PA-C  Department of Hospital Medicine   [facility]

## 2018-08-03 NOTE — PLAN OF CARE
Problem: Physical Therapy Goal  Goal: Physical Therapy Goal  Goals to be met by: 2018     Patient will increase functional independence with mobility by performin. Supine to sit with Modified [place]  2. Sit to stand transfer with Modified [place]  3. Gait  x 200 feet with Modified [place] using Single-point Cane .      Outcome: Ongoing (interventions implemented as appropriate)  Reviewed gait with SPC   Could benefit from same upon DC  Home with ? Outpatient PT

## 2018-08-03 NOTE — PLAN OF CARE
Problem: Patient Care Overview  Goal: Plan of Care Review  PT. AAOX3 BBS CLEAR RESPS EVEN UNLABORED . CARDIAC MONITORING 8581 O2SATS 97 % ON RA . HEADACHES AND RT FOOT PAIN CONTROLLED WITH PRN PAIN MEDS . SCDS BILATERALLY . CALL LIGHT IN REACH . SAFETY PRECAUTIONS MAINTAINED.

## 2018-08-10 ENCOUNTER — OFFICE VISIT (OUTPATIENT)
Dept: INTERNAL MEDICINE | Facility: CLINIC | Age: 58
End: 2018-08-10
Payer: COMMERCIAL

## 2018-08-10 VITALS
BODY MASS INDEX: 29.52 KG/M2 | OXYGEN SATURATION: 96 % | DIASTOLIC BLOOD PRESSURE: 68 MMHG | SYSTOLIC BLOOD PRESSURE: 104 MMHG | TEMPERATURE: 99 F | HEART RATE: 62 BPM | HEIGHT: 64 IN | WEIGHT: 172.94 LBS

## 2018-08-10 DIAGNOSIS — M21.371 RIGHT FOOT DROP: ICD-10-CM

## 2018-08-10 DIAGNOSIS — F33.41 RECURRENT MAJOR DEPRESSIVE DISORDER, IN PARTIAL REMISSION: ICD-10-CM

## 2018-08-10 DIAGNOSIS — I10 ESSENTIAL HYPERTENSION: ICD-10-CM

## 2018-08-10 DIAGNOSIS — I50.32 CHRONIC DIASTOLIC CONGESTIVE HEART FAILURE: Primary | ICD-10-CM

## 2018-08-10 PROBLEM — R78.81 BACTEREMIA DUE TO STAPHYLOCOCCUS AUREUS: Status: RESOLVED | Noted: 2018-01-11 | Resolved: 2018-08-10

## 2018-08-10 PROBLEM — B95.61 BACTEREMIA DUE TO STAPHYLOCOCCUS AUREUS: Status: RESOLVED | Noted: 2018-01-11 | Resolved: 2018-08-10

## 2018-08-10 PROBLEM — I50.31 ACUTE DIASTOLIC HEART FAILURE: Status: RESOLVED | Noted: 2018-08-02 | Resolved: 2018-08-10

## 2018-08-10 PROBLEM — T58.91XA TOXIC EFFECT OF CARBON MONOXIDE, UNINTENTIONAL: Status: RESOLVED | Noted: 2018-01-09 | Resolved: 2018-08-10

## 2018-08-10 PROCEDURE — 99999 PR PBB SHADOW E&M-EST. PATIENT-LVL III: CPT | Mod: PBBFAC,,, | Performed by: INTERNAL MEDICINE

## 2018-08-10 PROCEDURE — 99214 OFFICE O/P EST MOD 30 MIN: CPT | Mod: S$GLB,,, | Performed by: INTERNAL MEDICINE

## 2018-08-10 PROCEDURE — 3078F DIAST BP <80 MM HG: CPT | Mod: CPTII,S$GLB,, | Performed by: INTERNAL MEDICINE

## 2018-08-10 PROCEDURE — 3074F SYST BP LT 130 MM HG: CPT | Mod: CPTII,S$GLB,, | Performed by: INTERNAL MEDICINE

## 2018-08-10 PROCEDURE — 3008F BODY MASS INDEX DOCD: CPT | Mod: CPTII,S$GLB,, | Performed by: INTERNAL MEDICINE

## 2018-08-10 NOTE — PROGRESS NOTES
Subjective:       Patient ID: Spenser Rabago is a 57 y.o. male.    Chief Complaint: Foot Pain (7/10) and Headache    HPI  Recheck from hospital.  Pt was SOB, had gotten off of his metoprolol, was in decompensated CHF.  Ruled out for MI, PE.  Now w/o SOB.   No CP.  Depression, HA at baseline. Pt also with R foot pain, r foot drop from an injury in 12/17.  Review of Systems   All other systems reviewed and are negative.      Objective:      Physical Exam   Constitutional: He appears well-developed. No distress.   HENT:   Head: Normocephalic.   Eyes: EOM are normal. No scleral icterus.   Neck: Normal range of motion. No tracheal deviation present.   Cardiovascular: Normal rate, regular rhythm, normal heart sounds and intact distal pulses.    Pulmonary/Chest: Effort normal and breath sounds normal. No respiratory distress.   Abdominal: Soft. Bowel sounds are normal. He exhibits no distension.   Musculoskeletal: Normal range of motion. He exhibits no edema.   Neurological: He is alert. He exhibits abnormal muscle tone (R foot drop).   Skin: Skin is warm and dry. No rash noted. He is not diaphoretic. No erythema.   Psychiatric: He has a normal mood and affect. His behavior is normal.   Vitals reviewed.      Assessment:       1. Chronic diastolic congestive heart failure    2. Right foot drop    3. Essential hypertension    4. Recurrent major depressive disorder, in partial remission        Plan:       Spenser was seen today for foot pain and headache.    Diagnoses and all orders for this visit:    Chronic diastolic congestive heart failure   Compensated    Right foot drop   Cont brace    Essential hypertension   Well-cont      Follow-up in about 6 months (around 2/10/2019).

## 2018-09-21 ENCOUNTER — OFFICE VISIT (OUTPATIENT)
Dept: NEUROLOGY | Facility: CLINIC | Age: 58
End: 2018-09-21
Payer: COMMERCIAL

## 2018-09-21 VITALS
WEIGHT: 180.31 LBS | HEART RATE: 100 BPM | BODY MASS INDEX: 30.78 KG/M2 | HEIGHT: 64 IN | SYSTOLIC BLOOD PRESSURE: 152 MMHG | DIASTOLIC BLOOD PRESSURE: 86 MMHG

## 2018-09-21 DIAGNOSIS — I50.32 CHRONIC DIASTOLIC CONGESTIVE HEART FAILURE: ICD-10-CM

## 2018-09-21 DIAGNOSIS — G43.909 MIXED MIGRAINE AND MUSCLE CONTRACTION HEADACHE: Primary | Chronic | ICD-10-CM

## 2018-09-21 DIAGNOSIS — G44.209 MIXED MIGRAINE AND MUSCLE CONTRACTION HEADACHE: Primary | Chronic | ICD-10-CM

## 2018-09-21 DIAGNOSIS — G43.009 MIGRAINE WITHOUT AURA AND WITHOUT STATUS MIGRAINOSUS, NOT INTRACTABLE: ICD-10-CM

## 2018-09-21 DIAGNOSIS — I10 ESSENTIAL HYPERTENSION: Chronic | ICD-10-CM

## 2018-09-21 DIAGNOSIS — F90.9 ATTENTION DEFICIT HYPERACTIVITY DISORDER (ADHD), UNSPECIFIED ADHD TYPE: Chronic | ICD-10-CM

## 2018-09-21 PROBLEM — R29.898 MUSCULAR DECONDITIONING: Status: ACTIVE | Noted: 2018-01-18

## 2018-09-21 PROBLEM — G92.9 TOXIC ENCEPHALOPATHY: Status: ACTIVE | Noted: 2018-01-22

## 2018-09-21 PROBLEM — A41.9 SEPSIS: Status: ACTIVE | Noted: 2018-02-08

## 2018-09-21 PROCEDURE — 99999 PR PBB SHADOW E&M-EST. PATIENT-LVL III: CPT | Mod: PBBFAC,,, | Performed by: PSYCHIATRY & NEUROLOGY

## 2018-09-21 PROCEDURE — 3008F BODY MASS INDEX DOCD: CPT | Mod: CPTII,S$GLB,, | Performed by: PSYCHIATRY & NEUROLOGY

## 2018-09-21 PROCEDURE — 3079F DIAST BP 80-89 MM HG: CPT | Mod: CPTII,S$GLB,, | Performed by: PSYCHIATRY & NEUROLOGY

## 2018-09-21 PROCEDURE — 99214 OFFICE O/P EST MOD 30 MIN: CPT | Mod: S$GLB,,, | Performed by: PSYCHIATRY & NEUROLOGY

## 2018-09-21 PROCEDURE — 3077F SYST BP >= 140 MM HG: CPT | Mod: CPTII,S$GLB,, | Performed by: PSYCHIATRY & NEUROLOGY

## 2018-09-21 RX ORDER — TRAZODONE HYDROCHLORIDE 100 MG/1
100 TABLET ORAL
COMMUNITY
Start: 2018-02-01

## 2018-09-21 NOTE — PROGRESS NOTES
Subjective:       Patient ID: Spenser Rabago is a 57 y.o. male.    Chief Complaint:  Headache      History of Present Illness  HPI   This is a 57-year-old male who returns in follow-up.  He was last seen by me 6 months ago.  He has a long history of headaches, mixed in type, coming on with fluctuating intensity. He has been doing well and only takes OTC meds for the headache with an occasional Xanax that he takes at bedtime. He continues follow-up with psychiatry and is also being followed by pain management.  Medications were reviewed.  He continues working for an [employer] and [employer].  An MRI scan of the brain done in January 2018 for transient confusion revealed no acute intracranial process.  There was presence of symmetric T2 hyperintense signal within the globus pallidus bilaterally, which can be associated with toxic poisoning.  However he was subsequently admitted to the hospital in August 2018 with similar problems at which time he was noted to have CHF that was related to the fact that he was not taking his metoprolol on a regular basis.  Since he has been taking his other medications on a regular basis his cardiac symptoms shortness of breath have significantly improved.  He continues to get occasional headaches usually related to stress and anxiety.       Review of Systems  Review of Systems   Constitutional: Negative.    HENT: Negative for hearing loss.    Eyes: Negative.  Negative for visual disturbance.   Respiratory: Negative.  Negative for shortness of breath.    Cardiovascular: Negative.  Negative for chest pain and palpitations.   Gastrointestinal: Negative.    Genitourinary: Negative.    Musculoskeletal: Negative.  Negative for back pain, gait problem and neck pain.   Skin: Negative.    Neurological: Positive for headaches. Negative for tremors, seizures, syncope, speech difficulty, weakness and numbness.   Psychiatric/Behavioral: Negative for behavioral problems, confusion and  hallucinations. The patient is nervous/anxious.        Objective:      Neurologic Exam      Physical Exam   Constitutional: He appears well-developed and well-nourished.   HENT:   Head: Normocephalic and atraumatic.   Right Ear: Hearing normal.   Left Ear: Hearing normal.   Eyes:   Fundus examination shows sharp disc margins.   Neck: Normal range of motion. Neck supple. Carotid bruit is not present.   Neurological: He is alert. He has normal reflexes. He displays no atrophy. No cranial nerve deficit (Visual fields at bedside testing essentially normal.  No facial asymmetry noted with facial movements and sensory exam being normal/symmetrical.  Corneals/gag reflexes normal.  Tongue & palate movements normal.  Hearing unimpaired.  Shoulder shrug was norm) or sensory deficit. He exhibits normal muscle tone. He displays a negative Romberg sign. Coordination and gait normal.   Mental status examination: Patient is fully oriented and able to give an adequate history.  Recall of recent and past information is good.  Immediate recall is normal.  Attention span and concentration was normal.  Judgment and insight is normal.  Language functions are intact with no evidence of aphasia or dysarthria.  Comprehension is unimpaired.  Affect is appropriate, mood was even.  No thought disorder is noted.   Vitals reviewed.        Assessment:        1. Mixed migraine and muscle contraction headache    2. Migraine without aura and without status migrainosus, not intractable    3. Attention deficit hyperactivity disorder (ADHD), unspecified ADHD type    4. Chronic diastolic congestive heart failure    5. Essential hypertension             Plan:       no medication changes.  Continue Xanax on an as-needed basis.  Continue follow-up with his other physicians and by psychiatry.  He is advised to initiate magnesium oxide OTC supplementation 250 mg daily at bedtime for headache prophylaxis.  Follow-up in 6 months if stable.

## 2018-09-21 NOTE — PATIENT INSTRUCTIONS
no medication changes.  Continue Xanax on an as-needed basis.  Continue follow-up with his other physicians and by psychiatry.  He is advised to initiate magnesium oxide OTC supplementation 250 mg daily at bedtime for headache prophylaxis.

## 2018-09-24 DIAGNOSIS — I10 ESSENTIAL HYPERTENSION: ICD-10-CM

## 2018-09-25 RX ORDER — METOPROLOL SUCCINATE 50 MG/1
TABLET, EXTENDED RELEASE ORAL
Qty: 90 TABLET | Refills: 3 | Status: SHIPPED | OUTPATIENT
Start: 2018-09-25

## 2018-10-22 DIAGNOSIS — G43.909 MIXED MIGRAINE AND MUSCLE CONTRACTION HEADACHE: ICD-10-CM

## 2018-10-22 DIAGNOSIS — F32.A DEPRESSION, UNSPECIFIED DEPRESSION TYPE: ICD-10-CM

## 2018-10-22 DIAGNOSIS — G44.209 MIXED MIGRAINE AND MUSCLE CONTRACTION HEADACHE: ICD-10-CM

## 2018-10-22 RX ORDER — ALPRAZOLAM 1 MG/1
TABLET ORAL
Qty: 60 TABLET | Refills: 3 | Status: SHIPPED | OUTPATIENT
Start: 2018-10-22

## 2019-03-22 ENCOUNTER — TELEPHONE (OUTPATIENT)
Dept: NEUROLOGY | Facility: CLINIC | Age: 59
End: 2019-03-22

## 2019-03-22 NOTE — TELEPHONE ENCOUNTER
Patient no showed for appointment today, and tried to contact patient three times. Phone line is busy.

## 2020-01-18 NOTE — SUBJECTIVE & OBJECTIVE
CHIEF COMPLAINT:  Abdominal pain and worsening confusion      HISTORY OF PRESENT ILLNESS:      The patient is a 79 y.o. female patient of Dr. Viviana Barnes who presents with the above. PMHx significant for DM, HLD, HTN who presents for evaluation of nausea, vomiting, abdominal pain. . The patient states that she woke up with some discomfort and had vomiting. Upon further questioning she was not answering questions appropriately.  states that the patient went to sleep at [id number] and that time was her last known well.  also notes that the patient had some diarrhea      Past Medical History:    Past Medical History:   Diagnosis Date    Arthritis     Diabetes mellitus ([facility] [address]     Hyperlipidemia     Hypertension        Past Surgical History:    Past Surgical History:   Procedure Laterality Date    CARPAL TUNNEL RELEASE      CHOLECYSTECTOMY      HYSTERECTOMY         Medications Prior to Admission:    Medications Prior to Admission: ondansetron (ZOFRAN ODT) 4 MG disintegrating tablet, Take 1 tablet by mouth every 8 hours as needed for Nausea or Vomiting  pantoprazole (PROTONIX) 20 MG tablet, Take 1 tablet by mouth daily    Allergies:    Patient has no known allergies. Social History:    reports that she has never smoked. She has never used smokeless tobacco. She reports that she does not drink alcohol. Family History:   family history is not on file. REVIEW OF SYSTEMS:  As above in the HPI, otherwise negative    PHYSICAL EXAM:    Vitals:  /63   Pulse 85   Temp 98.4 °F (36.9 °C) (Oral)   Resp 22   Ht 5' 1\" (1.549 m)   Wt 182 lb 1.6 oz (82.6 kg)   SpO2 99%   BMI 34.41 kg/m²     General:  Awake, alert, oriented X 2. (Pleasantly confused, but, actually orients well, recognizes me and converses.) Well developed, well nourished, well groomed. No apparent distress. HEENT:  Normocephalic, atraumatic. Pupils equal, round, reactive to light. No scleral icterus.   No conjunctival Interval History: No acute events.    Review of Systems   Respiratory: Negative for shortness of breath.    Cardiovascular: Negative for chest pain and palpitations.   Gastrointestinal: Negative for nausea and vomiting.     Objective:     Vital Signs (Most Recent):  Temp: 98.5 °F (36.9 °C) (01/12/18 1604)  Pulse: 75 (01/12/18 1604)  Resp: 19 (01/12/18 1604)  BP: 119/72 (01/12/18 1604)  SpO2: 99 % (01/11/18 1936) Vital Signs (24h Range):  Temp:  [98.5 °F (36.9 °C)-99.4 °F (37.4 °C)] 98.5 °F (36.9 °C)  Pulse:  [75-85] 75  Resp:  [18-19] 19  SpO2:  [99 %] 99 %  BP: (109-127)/(71-82) 119/72     Weight: 74.9 kg (165 lb 2 oz)  Body mass index is 28.34 kg/m².    Intake/Output Summary (Last 24 hours) at 01/12/18 1717  Last data filed at 01/12/18 0613   Gross per 24 hour   Intake              256 ml   Output              658 ml   Net             -402 ml      Physical Exam   Constitutional: He is oriented to person, place, and time. He appears well-developed and well-nourished. No distress.   Pulmonary/Chest: Effort normal. No respiratory distress.   Musculoskeletal:   Left hand redness and swelling   Neurological: He is alert and oriented to person, place, and time.   Skin: Skin is warm. There is erythema.   Psychiatric: His affect is blunt. He is slowed. He does not exhibit a depressed mood.   Nursing note and vitals reviewed.      Significant Labs:   CBC:     Recent Labs  Lab 01/10/18  1742 01/12/18  1009   WBC 16.35* 8.71   HGB 14.3 13.1*   HCT 41.5 38.4*   * 324     CMP:     Recent Labs  Lab 01/12/18  1009   *   K 3.8   CL 97   CO2 21*   *   BUN 28*   CREATININE 0.8   CALCIUM 8.6*   ANIONGAP 12   EGFRNONAA >60     Magnesium:   No results for input(s): MG in the last 48 hours.    Significant Imaging: I have reviewed all pertinent imaging results/findings within the past 24 hours.    injection. Normal lips, teeth, and gums. No nasal discharge. Neck:  Supple  Heart:  RRR, no murmurs, gallops, rubs  Lungs:  CTA bilaterally, bilat symmetrical expansion, no wheeze, rales, or rhonchi  Abdomen: Bowel sounds actually are present, soft, tenderness in RLQ--but not terribly guarded, no masses, no organomegaly, no peritoneal signs  Extremities:  No clubbing, cyanosis, or edema  Skin:  Warm and dry, no open lesions or rash  Neuro:  Cranial nerves 2-12 intact, no focal deficits  Breast: deferred  Rectal: deferred  Genitalia:  deferred    LABS:  CBC:   Lab Results   Component Value Date    WBC 8.4 01/18/2020    RBC 3.78 01/18/2020    HGB 11.0 01/18/2020    HCT 35.0 01/18/2020    MCV 92.6 01/18/2020    MCH 29.1 01/18/2020    MCHC 31.4 01/18/2020    RDW 14.4 01/18/2020     01/18/2020    MPV 9.6 01/18/2020     CMP:    Lab Results   Component Value Date     01/18/2020    K 3.7 01/18/2020    K 5.6 01/17/2020     01/18/2020    CO2 22 01/18/2020    BUN 19 01/18/2020    CREATININE 1.1 01/18/2020    GFRAA 57 01/18/2020    LABGLOM 57 01/18/2020    GLUCOSE 142 01/18/2020    PROT 8.1 01/17/2020    LABALBU 4.0 01/17/2020    CALCIUM 8.4 01/18/2020    BILITOT 0.4 01/17/2020    ALKPHOS 79 01/17/2020    AST 46 01/17/2020    ALT 25 01/17/2020     BMP:    Lab Results   Component Value Date     01/18/2020    K 3.7 01/18/2020    K 5.6 01/17/2020     01/18/2020    CO2 22 01/18/2020    BUN 19 01/18/2020    LABALBU 4.0 01/17/2020    CREATININE 1.1 01/18/2020    CALCIUM 8.4 01/18/2020    GFRAA 57 01/18/2020    LABGLOM 57 01/18/2020    GLUCOSE 142 01/18/2020         ASSESSMENT: Seems improved from yesterday's presentation. VS stable. ATBs continue. Dr. Yris Pablo attention appreciated. Active Problems:    Acute appendicitis  Plan: Per Surgery. PLAN:    Present txVanessa De  7:28 AM  1/18/2020

## 2020-05-01 NOTE — PATIENT INSTRUCTIONS
04/30/2020:  Continue current home medications for blood pressure control    5/1/2020  Blood pressure controlled, continue to monitor     no medication changes.  Continue Xanax on an as-needed basis.  Continue follow-up with his other physicians and by psychiatry.  Advised to discuss the episode with his psychiatrist and given them a copy of the written report of the MRI scan.

## 2022-02-28 NOTE — PT/OT/SLP PROGRESS
Physical Therapy Treatment    Patient Name:  Spenser Rabago   MRN:  [id number]    Recommendations:     Discharge Recommendations:  rehabilitation facility   Discharge Equipment Recommendations:  (defer IPR)   Barriers to discharge: Inaccessible home and Decreased caregiver support    Assessment:     Spenser Rabago is a 57 y.o. male admitted with a medical diagnosis of Non-traumatic rhabdomyolysis.  He presents with the following impairments/functional limitations:  weakness, impaired endurance, impaired self care skills, impaired functional mobilty, gait instability, impaired balance, impaired cognition, decreased coordination, decreased upper extremity function, decreased lower extremity function, decreased safety awareness, decreased ROM, edema.  Pt continues to have difficulty with gait.  Pt ambulates with decreased step length, decreased foot from floor clearance R>L, and decreased balance.  Pt would continue to benefit from P.T. To address impairments listed below.    Rehab Prognosis:  good; patient would benefit from acute skilled PT services to address these deficits and reach maximum level of function.      Recent Surgery: * No surgery found *      Plan:     During this hospitalization, patient to be seen 6 x/week to address the above listed problems via gait training, therapeutic activities, therapeutic exercises, neuromuscular re-education  · Plan of Care Expires:  02/04/18   Plan of Care Reviewed with: patient    Subjective     Communicated with RN (Pily) prior to session.  Patient found supine upon PT entry to room, agreeable to treatment.      Patient comments: Pt agreed to tx.  Pain/Comfort:  · Pain Rating 1: 0/10  · Pain Rating Post-Intervention 1: 0/10    Patients cultural, spiritual, Druze conflicts given the current situation: None verbalized to PT    Objective:     Patient found with: bed alarm, pressure relief boots, peripheral IV     General Precautions: Standard, fall   Orthopedic  The patient feels that the cataract is significantly impacting daily activities and has elected cataract surgery. The risks, benefits, and alternatives to surgery were discussed. The patient elects to proceed with surgery. Precautions:N/A   Braces:       Functional Mobility:  · Bed Mobility:     · Rolling Left:  stand by assistance and contact guard assistance   · Rolling Right: stand by assistance and contact guard assistance using b/r for assist with CGA to guide right hand to rail.  · Scooting: stand by assistance, contact guard assistance to EOB  · Supine to Sit: minimum assistance(for BLEs today)  · Sit to Supine: stand by assistance  · Transfers:     · Sit to Stand:  contact guard assistance and minimum assistance with rolling walker and with vc's for hand placement  · Gait: 40ft with RW and Tye to occasionally maneuver Rw and for balance (unsteady). Pt ambulates taking short, quick steps with decreased step length, decreased foot from floor clearance, R>L, and R foot in front of L, which was less today, but still present.  Pt was stopped twice to regroup and given vc's and demonstration for proper stepping. Pt's first two steps were larger and more equal, but unable to maintain this.        AM-PAC 6 CLICK MOBILITY  Turning over in bed (including adjusting bedclothes, sheets and blankets)?: 3  Sitting down on and standing up from a chair with arms (e.g., wheelchair, bedside commode, etc.): 3  Moving from lying on back to sitting on the side of the bed?: 3  Moving to and from a bed to a chair (including a wheelchair)?: 3  Need to walk in hospital room?: 3  Climbing 3-5 steps with a railing?: 1  Total Score: 16       Therapeutic Activities and Exercises:   Seated BLE therex: AP, LAQ, hip flexion/ABD/ADD x 15 reps with assistance.  Static standing with RW and CGA/Tye secondary to occasional posterior lean.  Pt taking longer today to respond when answering questions.    Patient left supine with all lines intact, call button in reach, bed alarm on and RN notified..    GOALS:    Physical Therapy Goals        Problem: Physical Therapy Goal    Goal Priority Disciplines Outcome Goal Variances Interventions   Physical Therapy Goal      PT/OT, PT Ongoing (interventions implemented as appropriate)     Description:  Goals to be met by: 18     Patient will increase functional independence with mobility by performin. Supine to sit with Stand-by Assistance  2. Sit to supine with Stand-by Assistance MET 2018  3. Sit to stand transfer with Moderate Assistance MET 2018  4. Bed to chair transfer with Moderate Assistance using least restirctive AD MET 2018  5.  Assess gait     Updated Goals:  1. Supine<>sit with SBA  2. Sit to stand to SBA with RW  3. Bed to chair transfers with RW and CGA  4. Gait x 20 ft with RW and CGA                       Time Tracking:     PT Received On: 18  PT Start Time: 1350     PT Stop Time: 1414  PT Total Time (min): 24 min     Billable Minutes: Gait Training 10 and Therapeutic Exercise 14    Treatment Type: Treatment  PT/PTA: PTA     PTA Visit Number: 1     Giuliana Odonnell PTA  2018

## 2023-11-03 NOTE — PT/OT/SLP PROGRESS
"Speech Language Pathology Treatment    Patient Name:  Spenser Rabago   MRN:  [id number]  Admitting Diagnosis: Right leg weakness    Recommendations:                 General Recommendations:  Cognitive-linguistic therapy  Diet recommendations:  Regular, Liquid Diet Level: Thin   Aspiration Precautions: Standard aspiration precautions   General Precautions: Standard, fall  Communication strategies:  none    Subjective     Pt seen in room for ST session.   Patient goals: "I am thinking more clearly."     Pain/Comfort:  · Pain Rating 1: 0/10Pt reported no pain.     Objective:     Has the patient been evaluated by SLP for swallowing?   Yes  Keep patient NPO? No   Current Respiratory Status: room air      Pt found in room seen for ST session. No family at bedside. Pt asking for his lunch tray.   Pt alert, awake, oriented to time, place and situation.  He responded to situation questions with 80% acc.  Pt completed basic sequencing of ADLs with min A.   Pt responded to problem solving situations with 75% acc, within timely manner. Much improved.   Pt completed digit repetition of 3-4 numbers with min cues. Pt also cited "I am doing better."  Pt with less flat affect today and did smile x3 during session.       Assessment:     Spenser Rabago is a 57 y.o. male with an SLP diagnosis of improvement noted in cognitive-linguistic state. Pt making progress.       Goals:    SLP Goals        Problem: SLP Goal    Goal Priority Disciplines Outcome   SLP Goal     SLP Ongoing (interventions implemented as appropriate)   Description:  Short Term Goals:  1. Patient will successfully participate in [id number] evaluation to further assess for any communication impairments.  2. Pt will answer y/n complex questions with 90%.  3. Pt will recall events of the day with min A using compensatory memory strategies.  4. Pt will complete 3-4 word mental manipulation tasks with 80%.   5. Pt will complete category exclusion tasks " with 80%.   6. Pt will complete time/money reasoning tasks with 80%.   7. Pt will state 3 physical/cognitive changes impacting safety/independence with ADLS with min A.                       Plan:     · Patient to be seen:  3 x/week   · Plan of Care expires:  02/07/18  · Plan of Care reviewed with:  patient   · SLP Follow-Up:  Yes       Discharge recommendations:  rehabilitation facility       Time Tracking:     SLP Treatment Date:   01/17/18  Speech Start Time:  1216  Speech Stop Time:  1231     Speech Total Time (min):  15 min    Billable Minutes: Speech Therapy Individual 15    KAILA Nash, CCC-SLP  01/17/2018   Tremfya Pregnancy And Lactation Text: The risk during pregnancy and breastfeeding is uncertain with this medication. Cosentyx Pregnancy And Lactation Text: This medication is Pregnancy Category B and is considered safe during pregnancy. It is unknown if this medication is excreted in breast milk. Benzoyl Peroxide Counseling: Patient counseled that medicine may cause skin irritation and bleach clothing.  In the event of skin irritation, the patient was advised to reduce the amount of the drug applied or use it less frequently.   The patient verbalized understanding of the proper use and possible adverse effects of benzoyl peroxide.  All of the patient's questions and concerns were addressed. Cellcept Pregnancy And Lactation Text: This medication is Pregnancy Category D and isn't considered safe during pregnancy. It is unknown if this medication is excreted in breast milk. Niacinamide Pregnancy And Lactation Text: These medications are considered safe during pregnancy. Quinolones Pregnancy And Lactation Text: This medication is Pregnancy Category C and it isn't know if it is safe during pregnancy. It is also excreted in breast milk. Topical Ketoconazole Pregnancy And Lactation Text: This medication is Pregnancy Category B and is considered safe during pregnancy. It is unknown if it is excreted in breast milk. Simponi Counseling:  I discussed with the patient the risks of golimumab including but not limited to myelosuppression, immunosuppression, autoimmune hepatitis, demyelinating diseases, lymphoma, and serious infections.  The patient understands that monitoring is required including a PPD at baseline and must alert us or the primary physician if symptoms of infection or other concerning signs are noted. Solaraze Pregnancy And Lactation Text: This medication is Pregnancy Category B and is considered safe. There is some data to suggest avoiding during the third trimester. It is unknown if this medication is excreted in breast milk. Spironolactone Pregnancy And Lactation Text: This medication can cause feminization of the male fetus and should be avoided during pregnancy. The active metabolite is also found in breast milk. Ilumya Counseling: I discussed with the patient the risks of tildrakizumab including but not limited to immunosuppression, malignancy, posterior leukoencephalopathy syndrome, and serious infections.  The patient understands that monitoring is required including a PPD at baseline and must alert us or the primary physician if symptoms of infection or other concerning signs are noted. Opioid Counseling: I discussed with the patient the potential side effects of opioids including but not limited to addiction, altered mental status, and depression. I stressed avoiding alcohol, benzodiazepines, muscle relaxants and sleep aids unless specifically okayed by a physician. The patient verbalized understanding of the proper use and possible adverse effects of opioids. All of the patient's questions and concerns were addressed. They were instructed to flush the remaining pills down the toilet if they did not need them for pain. Nsaids Counseling: NSAID Counseling: I discussed with the patient that NSAIDs should be taken with food. Prolonged use of NSAIDs can result in the development of stomach ulcers.  Patient advised to stop taking NSAIDs if abdominal pain occurs.  The patient verbalized understanding of the proper use and possible adverse effects of NSAIDs.  All of the patient's questions and concerns were addressed. Azithromycin Pregnancy And Lactation Text: This medication is considered safe during pregnancy and is also secreted in breast milk. Cimetidine Counseling:  I discussed with the patient the risks of Cimetidine including but not limited to gynecomastia, headache, diarrhea, nausea, drowsiness, arrhythmias, pancreatitis, skin rashes, psychosis, bone marrow suppression and kidney toxicity. Rifampin Counseling: I discussed with the patient the risks of rifampin including but not limited to liver damage, kidney damage, red-orange body fluids, nausea/vomiting and severe allergy. Ivermectin Counseling:  Patient instructed to take medication on an empty stomach with a full glass of water.  Patient informed of potential adverse effects including but not limited to nausea, diarrhea, dizziness, itching, and swelling of the extremities or lymph nodes.  The patient verbalized understanding of the proper use and possible adverse effects of ivermectin.  All of the patient's questions and concerns were addressed. Griseofulvin Counseling:  I discussed with the patient the risks of griseofulvin including but not limited to photosensitivity, cytopenia, liver damage, nausea/vomiting and severe allergy.  The patient understands that this medication is best absorbed when taken with a fatty meal (e.g., ice cream or french fries). Doxycycline Pregnancy And Lactation Text: This medication is Pregnancy Category D and not consider safe during pregnancy. It is also excreted in breast milk but is considered safe for shorter treatment courses. Otezla Counseling: The side effects of Otezla were discussed with the patient, including but not limited to worsening or new depression, weight loss, diarrhea, nausea, upper respiratory tract infection, and headache. Patient instructed to call the office should any adverse effect occur.  The patient verbalized understanding of the proper use and possible adverse effects of Otezla.  All the patient's questions and concerns were addressed. Acitretin Counseling:  I discussed with the patient the risks of acitretin including but not limited to hair loss, dry lips/skin/eyes, liver damage, hyperlipidemia, depression/suicidal ideation, photosensitivity.  Serious rare side effects can include but are not limited to pancreatitis, pseudotumor cerebri, bony changes, clot formation/stroke/heart attack.  Patient understands that alcohol is contraindicated since it can result in liver toxicity and significantly prolong the elimination of the drug by many years. Dapsone Counseling: I discussed with the patient the risks of dapsone including but not limited to hemolytic anemia, agranulocytosis, rashes, methemoglobinemia, kidney failure, peripheral neuropathy, headaches, GI upset, and liver toxicity.  Patients who start dapsone require monitoring including baseline LFTs and weekly CBCs for the first month, then every month thereafter.  The patient verbalized understanding of the proper use and possible adverse effects of dapsone.  All of the patient's questions and concerns were addressed. Gabapentin Pregnancy And Lactation Text: This medication is Pregnancy Category C and isn't considered safe during pregnancy. It is excreted in breast milk. Drysol Pregnancy And Lactation Text: This medication is considered safe during pregnancy and breast feeding. Mirvaso Counseling: Mirvaso is a topical medication which can decrease superficial blood flow where applied. Side effects are uncommon and include stinging, redness and allergic reactions. Erythromycin Counseling:  I discussed with the patient the risks of erythromycin including but not limited to GI upset, allergic reaction, drug rash, diarrhea, increase in liver enzymes, and yeast infections. Xeljanz Counseling: I discussed with the patient the risks of Xeljanz therapy including increased risk of infection, liver issues, headache, diarrhea, or cold symptoms. Live vaccines should be avoided. They were instructed to call if they have any problems. Elidel Counseling: Patient may experience a mild burning sensation during topical application. Elidel is not approved in children less than 2 years of age. There have been case reports of hematologic and skin malignancies in patients using topical calcineurin inhibitors although causality is questionable. Otezla Pregnancy And Lactation Text: This medication is Pregnancy Category C and it isn't known if it is safe during pregnancy. It is unknown if it is excreted in breast milk. Dapsone Pregnancy And Lactation Text: This medication is Pregnancy Category C and is not considered safe during pregnancy or breast feeding. Use Enhanced Medication Counseling?: No Topical Sulfur Applications Counseling: Topical Sulfur Counseling: Patient counseled that this medication may cause skin irritation or allergic reactions.  In the event of skin irritation, the patient was advised to reduce the amount of the drug applied or use it less frequently.   The patient verbalized understanding of the proper use and possible adverse effects of topical sulfur application.  All of the patient's questions and concerns were addressed. Cyclophosphamide Counseling:  I discussed with the patient the risks of cyclophosphamide including but not limited to hair loss, hormonal abnormalities, decreased fertility, abdominal pain, diarrhea, nausea and vomiting, bone marrow suppression and infection. The patient understands that monitoring is required while taking this medication. Benzoyl Peroxide Pregnancy And Lactation Text: This medication is Pregnancy Category C. It is unknown if benzoyl peroxide is excreted in breast milk. Dupixent Counseling: I discussed with the patient the risks of dupilumab including but not limited to eye infection and irritation, cold sores, injection site reactions, worsening of asthma, allergic reactions and increased risk of parasitic infection.  Live vaccines should be avoided while taking dupilumab. Dupilumab will also interact with certain medications such as warfarin and cyclosporine. The patient understands that monitoring is required and they must alert us or the primary physician if symptoms of infection or other concerning signs are noted. Topical Retinoid counseling:  Patient advised to apply a pea-sized amount only at bedtime and wait 30 minutes after washing their face before applying.  If too drying, patient may add a non-comedogenic moisturizer. The patient verbalized understanding of the proper use and possible adverse effects of retinoids.  All of the patient's questions and concerns were addressed. Acitretin Pregnancy And Lactation Text: This medication is Pregnancy Category X and should not be given to women who are pregnant or may become pregnant in the future. This medication is excreted in breast milk. Cyclophosphamide Pregnancy And Lactation Text: This medication is Pregnancy Category D and it isn't considered safe during pregnancy. This medication is excreted in breast milk. Bactrim Counseling:  I discussed with the patient the risks of sulfa antibiotics including but not limited to GI upset, allergic reaction, drug rash, diarrhea, dizziness, photosensitivity, and yeast infections.  Rarely, more serious reactions can occur including but not limited to aplastic anemia, agranulocytosis, methemoglobinemia, blood dyscrasias, liver or kidney failure, lung infiltrates or desquamative/blistering drug rashes. Griseofulvin Pregnancy And Lactation Text: This medication is Pregnancy Category X and is known to cause serious birth defects. It is unknown if this medication is excreted in breast milk but breast feeding should be avoided. Skyrizi Counseling: I discussed with the patient the risks of risankizumab-rzaa including but not limited to immunosuppression, and serious infections.  The patient understands that monitoring is required including a PPD at baseline and must alert us or the primary physician if symptoms of infection or other concerning signs are noted. Rifampin Pregnancy And Lactation Text: This medication is Pregnancy Category C and it isn't know if it is safe during pregnancy. It is also excreted in breast milk and should not be used if you are breast feeding. Nsaids Pregnancy And Lactation Text: These medications are considered safe up to 30 weeks gestation. It is excreted in breast milk. Bexarotene Pregnancy And Lactation Text: This medication is Pregnancy Category X and should not be given to women who are pregnant or may become pregnant. This medication should not be used if you are breast feeding. SSKI Counseling:  I discussed with the patient the risks of SSKI including but not limited to thyroid abnormalities, metallic taste, GI upset, fever, headache, acne, arthralgias, paraesthesias, lymphadenopathy, easy bleeding, arrhythmias, and allergic reaction. Mirvaso Pregnancy And Lactation Text: This medication has not been assigned a Pregnancy Risk Category. It is unknown if the medication is excreted in breast milk. Opioid Pregnancy And Lactation Text: These medications can lead to premature delivery and should be avoided during pregnancy. These medications are also present in breast milk in small amounts. Glycopyrrolate Counseling:  I discussed with the patient the risks of glycopyrrolate including but not limited to skin rash, drowsiness, dry mouth, difficulty urinating, and blurred vision. Xelcharlesz Pregnancy And Lactation Text: This medication is Pregnancy Category D and is not considered safe during pregnancy.  The risk during breast feeding is also uncertain. Dutasteride Male Counseling: Dustasteride Counseling:  I discussed with the patient the risks of use of dutasteride including but not limited to decreased libido, decreased ejaculate volume, and gynecomastia. Women who can become pregnant should not handle medication.  All of the patient's questions and concerns were addressed. Erythromycin Pregnancy And Lactation Text: This medication is Pregnancy Category B and is considered safe during pregnancy. It is also excreted in breast milk. Carac Counseling:  I discussed with the patient the risks of Carac including but not limited to erythema, scaling, itching, weeping, crusting, and pain. Sski Pregnancy And Lactation Text: This medication is Pregnancy Category D and isn't considered safe during pregnancy. It is excreted in breast milk. Isotretinoin Counseling: Patient should get monthly blood tests, not donate blood, not drive at night if vision affected, not share medication, and not undergo elective surgery for 6 months after tx completed. Side effects reviewed, pt to contact office should one occur. Picato Counseling:  I discussed with the patient the risks of Picato including but not limited to erythema, scaling, itching, weeping, crusting, and pain. Oxybutynin Counseling:  I discussed with the patient the risks of oxybutynin including but not limited to skin rash, drowsiness, dry mouth, difficulty urinating, and blurred vision. Ivermectin Pregnancy And Lactation Text: This medication is Pregnancy Category C and it isn't known if it is safe during pregnancy. It is also excreted in breast milk. Cimetidine Pregnancy And Lactation Text: This medication is Pregnancy Category B and is considered safe during pregnancy. It is also excreted in breast milk and breast feeding isn't recommended. Topical Sulfur Applications Pregnancy And Lactation Text: This medication is Pregnancy Category C and has an unknown safety profile during pregnancy. It is unknown if this topical medication is excreted in breast milk. Dupixent Pregnancy And Lactation Text: This medication likely crosses the placenta but the risk for the fetus is uncertain. This medication is excreted in breast milk. Bactrim Pregnancy And Lactation Text: This medication is Pregnancy Category D and is known to cause fetal risk.  It is also excreted in breast milk. Sarecycline Counseling: Patient advised regarding possible photosensitivity and discoloration of the teeth, skin, lips, tongue and gums.  Patient instructed to avoid sunlight, if possible.  When exposed to sunlight, patients should wear protective clothing, sunglasses, and sunscreen.  The patient was instructed to call the office immediately if the following severe adverse effects occur:  hearing changes, easy bruising/bleeding, severe headache, or vision changes.  The patient verbalized understanding of the proper use and possible adverse effects of sarecycline.  All of the patient's questions and concerns were addressed. Odomzo Counseling- I discussed with the patient the risks of Odomzo including but not limited to nausea, vomiting, diarrhea, constipation, weight loss, changes in the sense of taste, decreased appetite, muscle spasms, and hair loss.  The patient verbalized understanding of the proper use and possible adverse effects of Odomzo.  All of the patient's questions and concerns were addressed. Itraconazole Counseling:  I discussed with the patient the risks of itraconazole including but not limited to liver damage, nausea/vomiting, neuropathy, and severe allergy.  The patient understands that this medication is best absorbed when taken with acidic beverages such as non-diet cola or ginger ale.  The patient understands that monitoring is required including baseline LFTs and repeat LFTs at intervals.  The patient understands that they are to contact us or the primary physician if concerning signs are noted. Wartpeel Counseling:  I discussed with the patient the risks of Wartpeel including but not limited to erythema, scaling, itching, weeping, crusting, and pain. Doxepin Counseling:  Patient advised that the medication is sedating and not to drive a car after taking this medication. Patient informed of potential adverse effects including but not limited to dry mouth, urinary retention, and blurry vision.  The patient verbalized understanding of the proper use and possible adverse effects of doxepin.  All of the patient's questions and concerns were addressed. Cyclosporine Counseling:  I discussed with the patient the risks of cyclosporine including but not limited to hypertension, gingival hyperplasia,myelosuppression, immunosuppression, liver damage, kidney damage, neurotoxicity, lymphoma, and serious infections. The patient understands that monitoring is required including baseline blood pressure, CBC, CMP, lipid panel and uric acid, and then 1-2 times monthly CMP and blood pressure. Glycopyrrolate Pregnancy And Lactation Text: This medication is Pregnancy Category B and is considered safe during pregnancy. It is unknown if it is excreted breast milk. Bexarotene Counseling:  I discussed with the patient the risks of bexarotene including but not limited to hair loss, dry lips/skin/eyes, liver abnormalities, hyperlipidemia, pancreatitis, depression/suicidal ideation, photosensitivity, drug rash/allergic reactions, hypothyroidism, anemia, leukopenia, infection, cataracts, and teratogenicity.  Patient understands that they will need regular blood tests to check lipid profile, liver function tests, white blood cell count, thyroid function tests and pregnancy test if applicable. Elidel Pregnancy And Lactation Text: This medication is Pregnancy Category C. It is unknown if this medication is excreted in breast milk. Infliximab Counseling:  I discussed with the patient the risks of infliximab including but not limited to myelosuppression, immunosuppression, autoimmune hepatitis, demyelinating diseases, lymphoma, and serious infections.  The patient understands that monitoring is required including a PPD at baseline and must alert us or the primary physician if symptoms of infection or other concerning signs are noted. Eucrisa Counseling: Patient may experience a mild burning sensation during topical application. Eucrisa is not approved in children less than 2 years of age. Dutasteride Pregnancy And Lactation Text: This medication is absolutely contraindicated in women, especially during pregnancy and breast feeding. Feminization of male fetuses is possible if taking while pregnant. Metronidazole Counseling:  I discussed with the patient the risks of metronidazole including but not limited to seizures, nausea/vomiting, a metallic taste in the mouth, nausea/vomiting and severe allergy. Thalidomide Counseling: I discussed with the patient the risks of thalidomide including but not limited to birth defects, anxiety, weakness, chest pain, dizziness, cough and severe allergy. Isotretinoin Pregnancy And Lactation Text: This medication is Pregnancy Category X and is considered extremely dangerous during pregnancy. It is unknown if it is excreted in breast milk. Carac Pregnancy And Lactation Text: This medication is Pregnancy Category X and contraindicated in pregnancy and in women who may become pregnant. It is unknown if this medication is excreted in breast milk. Arava Counseling:  Patient counseled regarding adverse effects of Arava including but not limited to nausea, vomiting, abnormalities in liver function tests. Patients may develop mouth sores, rash, diarrhea, and abnormalities in blood counts. The patient understands that monitoring is required including LFTs and blood counts.  There is a rare possibility of scarring of the liver and lung problems that can occur when taking methotrexate. Persistent nausea, loss of appetite, pale stools, dark urine, cough, and shortness of breath should be reported immediately. Patient advised to discontinue Arava treatment and consult with a physician prior to attempting conception. The patient will have to undergo a treatment to eliminate Arava from the body prior to conception. Xolair Counseling:  Patient informed of potential adverse effects including but not limited to fever, muscle aches, rash and allergic reactions.  The patient verbalized understanding of the proper use and possible adverse effects of Xolair.  All of the patient's questions and concerns were addressed. Cephalexin Counseling: I counseled the patient regarding use of cephalexin as an antibiotic for prophylactic and/or therapeutic purposes. Cephalexin (commonly prescribed under brand name Keflex) is a cephalosporin antibiotic which is active against numerous classes of bacteria, including most skin bacteria. Side effects may include nausea, diarrhea, gastrointestinal upset, rash, hives, yeast infections, and in rare cases, hepatitis, kidney disease, seizures, fever, confusion, neurologic symptoms, and others. Patients with severe allergies to penicillin medications are cautioned that there is about a 10% incidence of cross-reactivity with cephalosporins. When possible, patients with penicillin allergies should use alternatives to cephalosporins for antibiotic therapy. Doxepin Pregnancy And Lactation Text: This medication is Pregnancy Category C and it isn't known if it is safe during pregnancy. It is also excreted in breast milk and breast feeding isn't recommended. Sarecycline Pregnancy And Lactation Text: This medication is Pregnancy Category D and not consider safe during pregnancy. It is also excreted in breast milk. Cyclosporine Pregnancy And Lactation Text: This medication is Pregnancy Category C and it isn't know if it is safe during pregnancy. This medication is excreted in breast milk. Arava Pregnancy And Lactation Text: This medication is Pregnancy Category X and is absolutely contraindicated during pregnancy. It is unknown if it is excreted in breast milk. Xolair Pregnancy And Lactation Text: This medication is Pregnancy Category B and is considered safe during pregnancy. This medication is excreted in breast milk. Hydroxychloroquine Counseling:  I discussed with the patient that a baseline ophthalmologic exam is needed at the start of therapy and every year thereafter while on therapy. A CBC may also be warranted for monitoring.  The side effects of this medication were discussed with the patient, including but not limited to agranulocytosis, aplastic anemia, seizures, rashes, retinopathy, and liver toxicity. Patient instructed to call the office should any adverse effect occur.  The patient verbalized understanding of the proper use and possible adverse effects of Plaquenil.  All the patient's questions and concerns were addressed. Tazorac Counseling:  Patient advised that medication is irritating and drying.  Patient may need to apply sparingly and wash off after an hour before eventually leaving it on overnight.  The patient verbalized understanding of the proper use and possible adverse effects of tazorac.  All of the patient's questions and concerns were addressed. Stelara Counseling:  I discussed with the patient the risks of ustekinumab including but not limited to immunosuppression, malignancy, posterior leukoencephalopathy syndrome, and serious infections.  The patient understands that monitoring is required including a PPD at baseline and must alert us or the primary physician if symptoms of infection or other concerning signs are noted. Eucrisa Pregnancy And Lactation Text: This medication has not been assigned a Pregnancy Risk Category but animal studies failed to show danger with the topical medication. It is unknown if the medication is excreted in breast milk. High Dose Vitamin A Counseling: Side effects reviewed, pt to contact office should one occur. Hydroxychloroquine Pregnancy And Lactation Text: This medication has been shown to cause fetal harm but it isn't assigned a Pregnancy Risk Category. There are small amounts excreted in breast milk. Tazorac Pregnancy And Lactation Text: This medication is not safe during pregnancy. It is unknown if this medication is excreted in breast milk. Propranolol Counseling:  I discussed with the patient the risks of propranolol including but not limited to low heart rate, low blood pressure, low blood sugar, restlessness and increased cold sensitivity. They should call the office if they experience any of these side effects. Calcipotriene Counseling:  I discussed with the patient the risks of calcipotriene including but not limited to erythema, scaling, itching, and irritation. Erivedge Counseling- I discussed with the patient the risks of Erivedge including but not limited to nausea, vomiting, diarrhea, constipation, weight loss, changes in the sense of taste, decreased appetite, muscle spasms, and hair loss.  The patient verbalized understanding of the proper use and possible adverse effects of Erivedge.  All of the patient's questions and concerns were addressed. Protopic Counseling: Patient may experience a mild burning sensation during topical application. Protopic is not approved in children less than 2 years of age. There have been case reports of hematologic and skin malignancies in patients using topical calcineurin inhibitors although causality is questionable. Metronidazole Pregnancy And Lactation Text: This medication is Pregnancy Category B and considered safe during pregnancy.  It is also excreted in breast milk. Cephalexin Pregnancy And Lactation Text: This medication is Pregnancy Category B and considered safe during pregnancy.  It is also excreted in breast milk but can be used safely for shorter doses. Tetracycline Counseling: Patient counseled regarding possible photosensitivity and increased risk for sunburn.  Patient instructed to avoid sunlight, if possible.  When exposed to sunlight, patients should wear protective clothing, sunglasses, and sunscreen.  The patient was instructed to call the office immediately if the following severe adverse effects occur:  hearing changes, easy bruising/bleeding, severe headache, or vision changes.  The patient verbalized understanding of the proper use and possible adverse effects of tetracycline.  All of the patient's questions and concerns were addressed. Patient understands to avoid pregnancy while on therapy due to potential birth defects. Calcipotriene Pregnancy And Lactation Text: This medication has not been proven safe during pregnancy. It is unknown if this medication is excreted in breast milk. Clofazimine Counseling:  I discussed with the patient the risks of clofazimine including but not limited to skin and eye pigmentation, liver damage, nausea/vomiting, gastrointestinal bleeding and allergy. Rituxan Counseling:  I discussed with the patient the risks of Rituxan infusions. Side effects can include infusion reactions, severe drug rashes including mucocutaneous reactions, reactivation of latent hepatitis and other infections and rarely progressive multifocal leukoencephalopathy.  All of the patient's questions and concerns were addressed. Ketoconazole Counseling:   Patient counseled regarding improving absorption with orange juice.  Adverse effects include but are not limited to breast enlargement, headache, diarrhea, nausea, upset stomach, liver function test abnormalities, taste disturbance, and stomach pain.  There is a rare possibility of liver failure that can occur when taking ketoconazole. The patient understands that monitoring of LFTs may be required, especially at baseline. The patient verbalized understanding of the proper use and possible adverse effects of ketoconazole.  All of the patient's questions and concerns were addressed. Hydroxyzine Counseling: Patient advised that the medication is sedating and not to drive a car after taking this medication.  Patient informed of potential adverse effects including but not limited to dry mouth, urinary retention, and blurry vision.  The patient verbalized understanding of the proper use and possible adverse effects of hydroxyzine.  All of the patient's questions and concerns were addressed. Oral Minoxidil Counseling- I discussed with the patient the risks of oral minoxidil including but not limited to shortness of breath, swelling of the feet or ankles, dizziness, lightheadedness, unwanted hair growth and allergic reaction.  The patient verbalized understanding of the proper use and possible adverse effects of oral minoxidil.  All of the patient's questions and concerns were addressed. Winlevi Counseling:  I discussed with the patient the risks of topical clascoterone including but not limited to erythema, scaling, itching, and stinging. Patient voiced their understanding. Methotrexate Counseling:  Patient counseled regarding adverse effects of methotrexate including but not limited to nausea, vomiting, abnormalities in liver function tests. Patients may develop mouth sores, rash, diarrhea, and abnormalities in blood counts. The patient understands that monitoring is required including LFT's and blood counts.  There is a rare possibility of scarring of the liver and lung problems that can occur when taking methotrexate. Persistent nausea, loss of appetite, pale stools, dark urine, cough, and shortness of breath should be reported immediately. Patient advised to discontinue methotrexate treatment at least three months before attempting to become pregnant.  I discussed the need for folate supplements while taking methotrexate.  These supplements can decrease side effects during methotrexate treatment. The patient verbalized understanding of the proper use and possible adverse effects of methotrexate.  All of the patient's questions and concerns were addressed. Protopic Pregnancy And Lactation Text: This medication is Pregnancy Category C. It is unknown if this medication is excreted in breast milk when applied topically. Tranexamic Acid Counseling:  Patient advised of the small risk of bleeding problems with tranexamic acid. They were also instructed to call if they developed any nausea, vomiting or diarrhea. All of the patient's questions and concerns were addressed. Topical Clindamycin Counseling: Patient counseled that this medication may cause skin irritation or allergic reactions.  In the event of skin irritation, the patient was advised to reduce the amount of the drug applied or use it less frequently.   The patient verbalized understanding of the proper use and possible adverse effects of clindamycin.  All of the patient's questions and concerns were addressed. Rituxan Pregnancy And Lactation Text: This medication is Pregnancy Category C and it isn't know if it is safe during pregnancy. It is unknown if this medication is excreted in breast milk but similar antibodies are known to be excreted. Azathioprine Counseling:  I discussed with the patient the risks of azathioprine including but not limited to myelosuppression, immunosuppression, hepatotoxicity, lymphoma, and infections.  The patient understands that monitoring is required including baseline LFTs, Creatinine, possible TPMP genotyping and weekly CBCs for the first month and then every 2 weeks thereafter.  The patient verbalized understanding of the proper use and possible adverse effects of azathioprine.  All of the patient's questions and concerns were addressed. Oral Minoxidil Pregnancy And Lactation Text: This medication should only be used when clearly needed if you are pregnant, attempting to become pregnant or breast feeding. Imiquimod Counseling:  I discussed with the patient the risks of imiquimod including but not limited to erythema, scaling, itching, weeping, crusting, and pain.  Patient understands that the inflammatory response to imiquimod is variable from person to person and was educated regarded proper titration schedule.  If flu-like symptoms develop, patient knows to discontinue the medication and contact us. Propranolol Pregnancy And Lactation Text: This medication is Pregnancy Category C and it isn't known if it is safe during pregnancy. It is excreted in breast milk. Enbrel Counseling:  I discussed with the patient the risks of etanercept including but not limited to myelosuppression, immunosuppression, autoimmune hepatitis, demyelinating diseases, lymphoma, and infections.  The patient understands that monitoring is required including a PPD at baseline and must alert us or the primary physician if symptoms of infection or other concerning signs are noted. Hydroquinone Counseling:  Patient advised that medication may result in skin irritation, lightening (hypopigmentation), dryness, and burning.  In the event of skin irritation, the patient was advised to reduce the amount of the drug applied or use it less frequently.  Rarely, spots that are treated with hydroquinone can become darker (pseudoochronosis).  Should this occur, patient instructed to stop medication and call the office. The patient verbalized understanding of the proper use and possible adverse effects of hydroquinone.  All of the patient's questions and concerns were addressed. Libtayo Counseling- I discussed with the patient the risks of Libtayo including but not limited to nausea, vomiting, diarrhea, and bone or muscle pain.  The patient verbalized understanding of the proper use and possible adverse effects of Libtayo.  All of the patient's questions and concerns were addressed. High Dose Vitamin A Pregnancy And Lactation Text: High dose vitamin A therapy is contraindicated during pregnancy and breast feeding. Minocycline Counseling: Patient advised regarding possible photosensitivity and discoloration of the teeth, skin, lips, tongue and gums.  Patient instructed to avoid sunlight, if possible.  When exposed to sunlight, patients should wear protective clothing, sunglasses, and sunscreen.  The patient was instructed to call the office immediately if the following severe adverse effects occur:  hearing changes, easy bruising/bleeding, severe headache, or vision changes.  The patient verbalized understanding of the proper use and possible adverse effects of minocycline.  All of the patient's questions and concerns were addressed. 5-Fu Counseling: 5-Fluorouracil Counseling:  I discussed with the patient the risks of 5-fluorouracil including but not limited to erythema, scaling, itching, weeping, crusting, and pain. Birth Control Pills Counseling: Birth Control Pill Counseling: I discussed with the patient the potential side effects of OCPs including but not limited to increased risk of stroke, heart attack, thrombophlebitis, deep venous thrombosis, hepatic adenomas, breast changes, GI upset, headaches, and depression.  The patient verbalized understanding of the proper use and possible adverse effects of OCPs. All of the patient's questions and concerns were addressed. Taltz Counseling: I discussed with the patient the risks of ixekizumab including but not limited to immunosuppression, serious infections, worsening of inflammatory bowel disease and drug reactions.  The patient understands that monitoring is required including a PPD at baseline and must alert us or the primary physician if symptoms of infection or other concerning signs are noted. Winlevi Pregnancy And Lactation Text: This medication is considered safe during pregnancy and breastfeeding. Clindamycin Counseling: I counseled the patient regarding use of clindamycin as an antibiotic for prophylactic and/or therapeutic purposes. Clindamycin is active against numerous classes of bacteria, including skin bacteria. Side effects may include nausea, diarrhea, gastrointestinal upset, rash, hives, yeast infections, and in rare cases, colitis. Methotrexate Pregnancy And Lactation Text: This medication is Pregnancy Category X and is known to cause fetal harm. This medication is excreted in breast milk. Ketoconazole Pregnancy And Lactation Text: This medication is Pregnancy Category C and it isn't know if it is safe during pregnancy. It is also excreted in breast milk and breast feeding isn't recommended. Hydroxyzine Pregnancy And Lactation Text: This medication is not safe during pregnancy and should not be taken. It is also excreted in breast milk and breast feeding isn't recommended. Cimzia Counseling:  I discussed with the patient the risks of Cimzia including but not limited to immunosuppression, allergic reactions and infections.  The patient understands that monitoring is required including a PPD at baseline and must alert us or the primary physician if symptoms of infection or other concerning signs are noted. Prednisone Counseling:  I discussed with the patient the risks of prolonged use of prednisone including but not limited to weight gain, insomnia, osteoporosis, mood changes, diabetes, susceptibility to infection, glaucoma and high blood pressure.  In cases where prednisone use is prolonged, patients should be monitored with blood pressure checks, serum glucose levels and an eye exam.  Additionally, the patient may need to be placed on GI prophylaxis, PCP prophylaxis, and calcium and vitamin D supplementation and/or a bisphosphonate.  The patient verbalized understanding of the proper use and the possible adverse effects of prednisone.  All of the patient's questions and concerns were addressed. Siliq Counseling:  I discussed with the patient the risks of Siliq including but not limited to new or worsening depression, suicidal thoughts and behavior, immunosuppression, malignancy, posterior leukoencephalopathy syndrome, and serious infections.  The patient understands that monitoring is required including a PPD at baseline and must alert us or the primary physician if symptoms of infection or other concerning signs are noted. There is also a special program designed to monitor depression which is required with Siliq. Finasteride Male Counseling: Finasteride Counseling:  I discussed with the patient the risks of use of finasteride including but not limited to decreased libido, decreased ejaculate volume, gynecomastia, and depression. Women should not handle medication.  All of the patient's questions and concerns were addressed. Rhofade Counseling: Rhofade is a topical medication which can decrease superficial blood flow where applied. Side effects are uncommon and include stinging, redness and allergic reactions. Libtayo Pregnancy And Lactation Text: This medication is contraindicated in pregnancy and when breast feeding. Tranexamic Acid Pregnancy And Lactation Text: It is unknown if this medication is safe during pregnancy or breast feeding. Colchicine Counseling:  Patient counseled regarding adverse effects including but not limited to stomach upset (nausea, vomiting, stomach pain, or diarrhea).  Patient instructed to limit alcohol consumption while taking this medication.  Colchicine may reduce blood counts especially with prolonged use.  The patient understands that monitoring of kidney function and blood counts may be required, especially at baseline. The patient verbalized understanding of the proper use and possible adverse effects of colchicine.  All of the patient's questions and concerns were addressed. Finasteride Pregnancy And Lactation Text: This medication is absolutely contraindicated during pregnancy. It is unknown if it is excreted in breast milk. Minoxidil Counseling: Minoxidil is a topical medication which can increase blood flow where it is applied. It is uncertain how this medication increases hair growth. Side effects are uncommon and include stinging and allergic reactions. Valtrex Counseling: I discussed with the patient the risks of valacyclovir including but not limited to kidney damage, nausea, vomiting and severe allergy.  The patient understands that if the infection seems to be worsening or is not improving, they are to call. Humira Counseling:  I discussed with the patient the risks of adalimumab including but not limited to myelosuppression, immunosuppression, autoimmune hepatitis, demyelinating diseases, lymphoma, and serious infections.  The patient understands that monitoring is required including a PPD at baseline and must alert us or the primary physician if symptoms of infection or other concerning signs are noted. Birth Control Pills Pregnancy And Lactation Text: This medication should be avoided if pregnant and for the first 30 days post-partum. Azelaic Acid Counseling: Patient counseled that medicine may cause skin irritation and to avoid applying near the eyes.  In the event of skin irritation, the patient was advised to reduce the amount of the drug applied or use it less frequently.   The patient verbalized understanding of the proper use and possible adverse effects of azelaic acid.  All of the patient's questions and concerns were addressed. Cimzia Pregnancy And Lactation Text: This medication crosses the placenta but can be considered safe in certain situations. Cimzia may be excreted in breast milk. Zyclara Counseling:  I discussed with the patient the risks of imiquimod including but not limited to erythema, scaling, itching, weeping, crusting, and pain.  Patient understands that the inflammatory response to imiquimod is variable from person to person and was educated regarded proper titration schedule.  If flu-like symptoms develop, patient knows to discontinue the medication and contact us. Clindamycin Pregnancy And Lactation Text: This medication can be used in pregnancy if certain situations. Clindamycin is also present in breast milk. Terbinafine Counseling: Patient counseling regarding adverse effects of terbinafine including but not limited to headache, diarrhea, rash, upset stomach, liver function test abnormalities, itching, taste/smell disturbance, nausea, abdominal pain, and flatulence.  There is a rare possibility of liver failure that can occur when taking terbinafine.  The patient understands that a baseline LFT and kidney function test may be required. The patient verbalized understanding of the proper use and possible adverse effects of terbinafine.  All of the patient's questions and concerns were addressed. Cellcept Counseling:  I discussed with the patient the risks of mycophenolate mofetil including but not limited to infection/immunosuppression, GI upset, hypokalemia, hypercholesterolemia, bone marrow suppression, lymphoproliferative disorders, malignancy, GI ulceration/bleed/perforation, colitis, interstitial lung disease, kidney failure, progressive multifocal leukoencephalopathy, and birth defects.  The patient understands that monitoring is required including a baseline creatinine and regular CBC testing. In addition, patient must alert us immediately if symptoms of infection or other concerning signs are noted. Tremfya Counseling: I discussed with the patient the risks of guselkumab including but not limited to immunosuppression, serious infections, worsening of inflammatory bowel disease and drug reactions.  The patient understands that monitoring is required including a PPD at baseline and must alert us or the primary physician if symptoms of infection or other concerning signs are noted. Doxycycline Counseling:  Patient counseled regarding possible photosensitivity and increased risk for sunburn.  Patient instructed to avoid sunlight, if possible.  When exposed to sunlight, patients should wear protective clothing, sunglasses, and sunscreen.  The patient was instructed to call the office immediately if the following severe adverse effects occur:  hearing changes, easy bruising/bleeding, severe headache, or vision changes.  The patient verbalized understanding of the proper use and possible adverse effects of doxycycline.  All of the patient's questions and concerns were addressed. Fluconazole Counseling:  Patient counseled regarding adverse effects of fluconazole including but not limited to headache, diarrhea, nausea, upset stomach, liver function test abnormalities, taste disturbance, and stomach pain.  There is a rare possibility of liver failure that can occur when taking fluconazole.  The patient understands that monitoring of LFTs and kidney function test may be required, especially at baseline. The patient verbalized understanding of the proper use and possible adverse effects of fluconazole.  All of the patient's questions and concerns were addressed. Quinolones Counseling:  I discussed with the patient the risks of fluoroquinolones including but not limited to GI upset, allergic reaction, drug rash, diarrhea, dizziness, photosensitivity, yeast infections, liver function test abnormalities, tendonitis/tendon rupture. Niacinamide Counseling: I recommended taking niacin or niacinamide, also know as vitamin B3, twice daily. Recent evidence suggests that taking vitamin B3 (500 mg twice daily) can reduce the risk of actinic keratoses and non-melanoma skin cancers. Side effects of vitamin B3 include flushing and headache. Albendazole Counseling:  I discussed with the patient the risks of albendazole including but not limited to cytopenia, kidney damage, nausea/vomiting and severe allergy.  The patient understands that this medication is being used in an off-label manner. Topical Ketoconazole Counseling: Patient counseled that this medication may cause skin irritation or allergic reactions.  In the event of skin irritation, the patient was advised to reduce the amount of the drug applied or use it less frequently.   The patient verbalized understanding of the proper use and possible adverse effects of ketoconazole.  All of the patient's questions and concerns were addressed. Spironolactone Counseling: Patient advised regarding risks of diarrhea, abdominal pain, hyperkalemia, birth defects (for female patients), liver toxicity and renal toxicity. The patient may need blood work to monitor liver and kidney function and potassium levels while on therapy. The patient verbalized understanding of the proper use and possible adverse effects of spironolactone.  All of the patient's questions and concerns were addressed. Gabapentin Counseling: I discussed with the patient the risks of gabapentin including but not limited to dizziness, somnolence, fatigue and ataxia. Azithromycin Counseling:  I discussed with the patient the risks of azithromycin including but not limited to GI upset, allergic reaction, drug rash, diarrhea, and yeast infections. Valtrex Pregnancy And Lactation Text: this medication is Pregnancy Category B and is considered safe during pregnancy. This medication is not directly found in breast milk but it's metabolite acyclovir is present. Detail Level: Zone Solaraze Counseling:  I discussed with the patient the risks of Solaraze including but not limited to erythema, scaling, itching, weeping, crusting, and pain. Cosentyx Counseling:  I discussed with the patient the risks of Cosentyx including but not limited to worsening of Crohn's disease, immunosuppression, allergic reactions and infections.  The patient understands that monitoring is required including a PPD at baseline and must alert us or the primary physician if symptoms of infection or other concerning signs are noted. Drysol Counseling:  I discussed with the patient the risks of drysol/aluminum chloride including but not limited to skin rash, itching, irritation, burning. Adbry Pregnancy And Lactation Text: It is unknown if this medication will adversely affect pregnancy or breast feeding. Olanzapine Counseling- I discussed with the patient the common side effects of olanzapine including but are not limited to: lack of energy, dry mouth, increased appetite, sleepiness, tremor, constipation, dizziness, changes in behavior, or restlessness.  Explained that teenagers are more likely to experience headaches, abdominal pain, pain in the arms or legs, tiredness, and sleepiness.  Serious side effects include but are not limited: increased risk of death in elderly patients who are confused, have memory loss, or dementia-related psychosis; hyperglycemia; increased cholesterol and triglycerides; and weight gain. Sotyktu Pregnancy And Lactation Text: There is insufficient data to evaluate whether or not Sotyktu is safe to use during pregnancy.? ?It is not known if Sotyktu passes into breast milk and whether or not it is safe to use when breastfeeding.?? Olanzapine Pregnancy And Lactation Text: This medication is pregnancy category C.   There are no adequate and well controlled trials with olanzapine in pregnant females.  Olanzapine should be used during pregnancy only if the potential benefit justifies the potential risk to the fetus.   In a study in lactating healthy women, olanzapine was excreted in breast milk.  It is recommended that women taking olanzapine should not breast feed. Cibinqo Counseling: I discussed with the patient the risks of Cibinqo therapy including but not limited to common cold, nausea, headache, cold sores, increased blood CPK levels, dizziness, UTIs, fatigue, acne, and vomitting. Live vaccines should be avoided.  This medication has been linked to serious infections; higher rate of mortality; malignancy and lymphoproliferative disorders; major adverse cardiovascular events; thrombosis; thrombocytopenia and lymphopenia; lipid elevations; and retinal detachment. Cibinqo Pregnancy And Lactation Text: It is unknown if this medication will adversely affect pregnancy or breast feeding.  You should not take this medication if you are currently pregnant or planning a pregnancy or while breastfeeding. Low Dose Naltrexone Counseling- I discussed with the patient the potential risks and side effects of low dose naltrexone including but not limited to: more vivid dreams, headaches, nausea, vomiting, abdominal pain, fatigue, dizziness, and anxiety. Olumiant Counseling: I discussed with the patient the risks of Olumiant therapy including but not limited to upper respiratory tract infections, shingles, cold sores, and nausea. Live vaccines should be avoided.  This medication has been linked to serious infections; higher rate of mortality; malignancy and lymphoproliferative disorders; major adverse cardiovascular events; thrombosis; gastrointestinal perforations; neutropenia; lymphopenia; anemia; liver enzyme elevations; and lipid elevations. Low Dose Naltrexone Pregnancy And Lactation Text: Naltrexone is pregnancy category C.  There have been no adequate and well-controlled studies in pregnant women.  It should be used in pregnancy only if the potential benefit justifies the potential risk to the fetus.   Limited data indicates that naltrexone is minimally excreted into breastmilk. Olumiant Pregnancy And Lactation Text: Based on animal studies, Olumiant may cause embryo-fetal harm when administered to pregnant women.  The medication should not be used in pregnancy.  Breastfeeding is not recommended during treatment. Opzelura Counseling:  I discussed with the patient the risks of Opzelura including but not limited to nasopharngitis, bronchitis, ear infection, eosinophila, hives, diarrhea, folliculitis, tonsillitis, and rhinorrhea.  Taken orally, this medication has been linked to serious infections; higher rate of mortality; malignancy and lymphoproliferative disorders; major adverse cardiovascular events; thrombosis; thrombocytopenia, anemia, and neutropenia; and lipid elevations. Rinvoq Counseling: I discussed with the patient the risks of Rinvoq therapy including but not limited to upper respiratory tract infections, shingles, cold sores, bronchitis, nausea, cough, fever, acne, and headache. Live vaccines should be avoided.  This medication has been linked to serious infections; higher rate of mortality; malignancy and lymphoproliferative disorders; major adverse cardiovascular events; thrombosis; thrombocytopenia, anemia, and neutropenia; lipid elevations; liver enzyme elevations; and gastrointestinal perforations. Opzelura Pregnancy And Lactation Text: There is insufficient data to evaluate drug-associated risk for major birth defects, miscarriage, or other adverse maternal or fetal outcomes.  There is a pregnancy registry that monitors pregnancy outcomes in pregnant persons exposed to the medication during pregnancy.  It is unknown if this medication is excreted in breast milk.  Do not breastfeed during treatment and for about 4 weeks after the last dose. Rinvoq Pregnancy And Lactation Text: Based on animal studies, Rinvoq may cause embryo-fetal harm when administered to pregnant women.  The medication should not be used in pregnancy.  Breastfeeding is not recommended during treatment and for 6 days after the last dose. Adbry Counseling: I discussed with the patient the risks of tralokinumab including but not limited to eye infection and irritation, cold sores, injection site reactions, worsening of asthma, allergic reactions and increased risk of parasitic infection.  Live vaccines should be avoided while taking tralokinumab. The patient understands that monitoring is required and they must alert us or the primary physician if symptoms of infection or other concerning signs are noted. Sotyktu Counseling:  I discussed the most common side effects of Sotyktu including: common cold, sore throat, sinus infections, cold sores, canker sores, folliculitis, and acne.? I also discussed more serious side effects of Sotyktu including but not limited to: serious allergic reactions; increased risk for infections such as TB; cancers such as lymphomas; rhabdomyolysis and elevated CPK; and elevated triglycerides and liver enzymes.?

## 2024-07-09 NOTE — PLAN OF CARE
Problem: Physical Therapy Goal  Goal: Physical Therapy Goal  Goals to be met by: 18     Patient will increase functional independence with mobility by performin. Supine to sit with Stand-by Assistance  2. Sit to supine with Stand-by Assistance MET 2018  3. Sit to stand transfer with Moderate Assistance MET 2018  4. Bed to chair transfer with Moderate Assistance using least restirctive AD MET 2018  5.  Assess gait     Updated Goals:  1. Supine<>sit with SBA  2. Sit to stand to SBA with RW  3. Bed to chair transfers with RW and CGA  4. Gait x 20 ft with RW and CGA      Outcome: Ongoing (interventions implemented as appropriate)  Progressing towards functional goals Goal #1 met  Continue working towards all goals       No

## 2024-10-22 NOTE — SUBJECTIVE & OBJECTIVE
Interval History: Still weak, but eating better and working with therapy.     Review of Systems   Constitutional: Negative for chills and fever.   Respiratory: Negative for cough and shortness of breath.    Cardiovascular: Negative for chest pain and palpitations.   Gastrointestinal: Negative for nausea and vomiting.     Objective:     Vital Signs (Most Recent):  Temp: 96.1 °F (35.6 °C) (01/08/18 1242)  Pulse: 78 (01/08/18 1242)  Resp: 16 (01/08/18 1242)  BP: (!) 137/91 (01/08/18 1242)  SpO2: 98 % (01/08/18 1604) Vital Signs (24h Range):  Temp:  [96.1 °F (35.6 °C)-99.3 °F (37.4 °C)] 96.1 °F (35.6 °C)  Pulse:  [68-92] 78  Resp:  [16-18] 16  SpO2:  [96 %-98 %] 98 %  BP: (123-137)/(77-91) 137/91     Weight: 74.9 kg (165 lb 2 oz)  Body mass index is 28.34 kg/m².    Intake/Output Summary (Last 24 hours) at 01/08/18 1707  Last data filed at 01/08/18 0900   Gross per 24 hour   Intake              276 ml   Output             2046 ml   Net            -1770 ml      Physical Exam   Constitutional: He is oriented to person, place, and time. He appears well-developed and well-nourished. No distress.   HENT:   Head: Normocephalic and atraumatic.   Abdominal: Soft. He exhibits no distension. There is no tenderness.   Musculoskeletal: He exhibits edema and tenderness (left hand).   Neurological: He is alert and oriented to person, place, and time.   Psychiatric: His affect is blunt. He is slowed. He exhibits a depressed mood.   Nursing note and vitals reviewed.      Significant Labs:   CBC:   Recent Labs  Lab 01/07/18  0622   WBC 11.87   HGB 12.4*   HCT 36.1*        CMP:   Recent Labs  Lab 01/07/18  0622 01/08/18  0350   * 128*   K 3.4* 4.3   CL 95 97   CO2 25 23   * 103   BUN 15 13   CREATININE 0.7 0.7   CALCIUM 7.6* 8.1*   ANIONGAP 7* 8   EGFRNONAA >60 >60     Magnesium:   Recent Labs  Lab 01/07/18  0622 01/08/18  0350   MG 1.9 1.7       Significant Imaging: I have reviewed all pertinent imaging  results/findings within the past 24 hours.   76

## 2025-02-04 NOTE — PT/OT/SLP PROGRESS
Speech Language Pathology      Spenser Rabago  MRN: [id number]    Patient not seen this pm as pt working with physical therapy. Will re-attempt tx as time allows.         KAILA Nash, CCC-SLP  1/16/2018     No